# Patient Record
Sex: FEMALE | Race: WHITE | URBAN - METROPOLITAN AREA
[De-identification: names, ages, dates, MRNs, and addresses within clinical notes are randomized per-mention and may not be internally consistent; named-entity substitution may affect disease eponyms.]

---

## 2019-11-11 ENCOUNTER — COMPLETE SKIN EXAM (OUTPATIENT)
Dept: URBAN - METROPOLITAN AREA CLINIC 18 | Facility: CLINIC | Age: 39
Setting detail: DERMATOLOGY
End: 2019-11-11

## 2019-11-11 ENCOUNTER — RX ONLY (RX ONLY)
Age: 39
End: 2019-11-11

## 2019-11-11 DIAGNOSIS — D22.5 MELANOCYTIC NEVI OF TRUNK: ICD-10-CM

## 2019-11-11 DIAGNOSIS — L82.1 OTHER SEBORRHEIC KERATOSIS: ICD-10-CM

## 2019-11-11 DIAGNOSIS — L81.4 OTHER MELANIN HYPERPIGMENTATION: ICD-10-CM

## 2019-11-11 PROCEDURE — 99214 OFFICE O/P EST MOD 30 MIN: CPT

## 2019-11-11 RX ORDER — IVERMECTIN 10 MG/G
CREAM TOPICAL
Qty: 45 | Refills: 3
Start: 2019-11-11

## 2019-11-11 RX ORDER — CICLOPIROX OLAMINE 7.7 MG/G
CREAM TOPICAL
Qty: 90 | Refills: 3
Start: 2019-11-11

## 2019-11-11 RX ORDER — CLOTRIMAZOLE AND BETAMETHASONE DIPROPIONATE 10; .5 MG/G; MG/G
CREAM TOPICAL
Qty: 45 | Refills: 0
Start: 2019-11-11

## 2019-12-26 ENCOUNTER — RX ONLY (RX ONLY)
Age: 39
End: 2019-12-26

## 2019-12-26 RX ORDER — OXYMETAZOLINE HYDROCHLORIDE 1 G/100G
CREAM TOPICAL
Qty: 30 | Refills: 3
Start: 2019-12-26

## 2020-11-09 ENCOUNTER — RX ONLY (RX ONLY)
Age: 40
End: 2020-11-09

## 2020-11-09 ENCOUNTER — FOLLOW-UP (OUTPATIENT)
Dept: URBAN - METROPOLITAN AREA CLINIC 18 | Facility: CLINIC | Age: 40
Setting detail: DERMATOLOGY
End: 2020-11-09

## 2020-11-09 DIAGNOSIS — Z48.02 ENCOUNTER FOR REMOVAL OF SUTURES: ICD-10-CM

## 2020-11-09 PROBLEM — L82.1 OTHER SEBORRHEIC KERATOSIS: Status: RESOLVED | Noted: 2020-11-09

## 2020-11-09 PROBLEM — D18.01 HEMANGIOMA OF SKIN AND SUBCUTANEOUS TISSUE: Status: RESOLVED | Noted: 2020-11-09

## 2020-11-09 PROCEDURE — 99214 OFFICE O/P EST MOD 30 MIN: CPT

## 2020-11-09 RX ORDER — HYDROCORTISONE 25 MG/G
CREAM TOPICAL
Qty: 60 | Refills: 0
Start: 2020-11-09

## 2021-09-23 ENCOUNTER — COMPLETE SKIN EXAM (OUTPATIENT)
Dept: URBAN - METROPOLITAN AREA CLINIC 18 | Facility: CLINIC | Age: 41
Setting detail: DERMATOLOGY
End: 2021-09-23

## 2021-09-23 DIAGNOSIS — B07.9 VIRAL WART, UNSPECIFIED: ICD-10-CM

## 2021-09-23 PROBLEM — D18.01 HEMANGIOMA OF SKIN AND SUBCUTANEOUS TISSUE: Status: RESOLVED | Noted: 2021-09-23

## 2021-09-23 PROCEDURE — 99214 OFFICE O/P EST MOD 30 MIN: CPT

## 2022-01-27 ENCOUNTER — HOSPITAL ENCOUNTER (EMERGENCY)
Age: 42
Discharge: HOME | End: 2022-01-27
Payer: COMMERCIAL

## 2022-01-27 ENCOUNTER — APPOINTMENT (OUTPATIENT)
Dept: GENERAL RADIOLOGY | Age: 42
End: 2022-01-27
Payer: COMMERCIAL

## 2022-01-27 VITALS
HEIGHT: 66 IN | BODY MASS INDEX: 47.09 KG/M2 | SYSTOLIC BLOOD PRESSURE: 100 MMHG | HEART RATE: 92 BPM | DIASTOLIC BLOOD PRESSURE: 60 MMHG | WEIGHT: 293 LBS | OXYGEN SATURATION: 91 % | RESPIRATION RATE: 17 BRPM | TEMPERATURE: 98.7 F

## 2022-01-27 DIAGNOSIS — R19.7 NAUSEA VOMITING AND DIARRHEA: ICD-10-CM

## 2022-01-27 DIAGNOSIS — R07.9 CHEST PAIN, UNSPECIFIED TYPE: Primary | ICD-10-CM

## 2022-01-27 DIAGNOSIS — R11.2 NAUSEA VOMITING AND DIARRHEA: ICD-10-CM

## 2022-01-27 LAB
ALBUMIN SERPL-MCNC: 4.3 G/DL (ref 3.5–5.2)
ALP BLD-CCNC: 68 U/L (ref 35–104)
ALT SERPL-CCNC: 40 U/L (ref 5–33)
ANION GAP SERPL CALCULATED.3IONS-SCNC: 11 MMOL/L (ref 7–19)
AST SERPL-CCNC: 26 U/L (ref 5–32)
BASOPHILS ABSOLUTE: 0 K/UL (ref 0–0.2)
BASOPHILS RELATIVE PERCENT: 0.4 % (ref 0–1)
BILIRUB SERPL-MCNC: 0.3 MG/DL (ref 0.2–1.2)
BILIRUBIN URINE: NEGATIVE
BLOOD, URINE: NEGATIVE
BUN BLDV-MCNC: 11 MG/DL (ref 6–20)
CALCIUM SERPL-MCNC: 9.3 MG/DL (ref 8.6–10)
CHLORIDE BLD-SCNC: 102 MMOL/L (ref 98–111)
CLARITY: CLEAR
CO2: 26 MMOL/L (ref 22–29)
COLOR: YELLOW
CREAT SERPL-MCNC: 0.5 MG/DL (ref 0.5–0.9)
D DIMER: 0.37 UG/ML FEU (ref 0–0.48)
EOSINOPHILS ABSOLUTE: 0.2 K/UL (ref 0–0.6)
EOSINOPHILS RELATIVE PERCENT: 1.9 % (ref 0–5)
GFR AFRICAN AMERICAN: >59
GFR NON-AFRICAN AMERICAN: >60
GLUCOSE BLD-MCNC: 101 MG/DL (ref 74–109)
GLUCOSE URINE: NEGATIVE MG/DL
HCT VFR BLD CALC: 42.4 % (ref 37–47)
HEMOGLOBIN: 13.4 G/DL (ref 12–16)
IMMATURE GRANULOCYTES #: 0 K/UL
KETONES, URINE: NEGATIVE MG/DL
LEUKOCYTE ESTERASE, URINE: NEGATIVE
LYMPHOCYTES ABSOLUTE: 2.4 K/UL (ref 1.1–4.5)
LYMPHOCYTES RELATIVE PERCENT: 24.8 % (ref 20–40)
MCH RBC QN AUTO: 27.2 PG (ref 27–31)
MCHC RBC AUTO-ENTMCNC: 31.6 G/DL (ref 33–37)
MCV RBC AUTO: 86 FL (ref 81–99)
MONOCYTES ABSOLUTE: 0.7 K/UL (ref 0–0.9)
MONOCYTES RELATIVE PERCENT: 7.6 % (ref 0–10)
NEUTROPHILS ABSOLUTE: 6.2 K/UL (ref 1.5–7.5)
NEUTROPHILS RELATIVE PERCENT: 64.9 % (ref 50–65)
NITRITE, URINE: NEGATIVE
PDW BLD-RTO: 13.2 % (ref 11.5–14.5)
PH UA: 5.5 (ref 5–8)
PLATELET # BLD: 288 K/UL (ref 130–400)
PMV BLD AUTO: 9.1 FL (ref 9.4–12.3)
POTASSIUM REFLEX MAGNESIUM: 4 MMOL/L (ref 3.5–5)
PROTEIN UA: NEGATIVE MG/DL
RBC # BLD: 4.93 M/UL (ref 4.2–5.4)
SARS-COV-2, NAAT: NOT DETECTED
SODIUM BLD-SCNC: 139 MMOL/L (ref 136–145)
SPECIFIC GRAVITY UA: 1.02 (ref 1–1.03)
TOTAL PROTEIN: 7 G/DL (ref 6.6–8.7)
TROPONIN: <0.01 NG/ML (ref 0–0.03)
TROPONIN: <0.01 NG/ML (ref 0–0.03)
UROBILINOGEN, URINE: 0.2 E.U./DL
WBC # BLD: 9.5 K/UL (ref 4.8–10.8)

## 2022-01-27 PROCEDURE — 71045 X-RAY EXAM CHEST 1 VIEW: CPT

## 2022-01-27 PROCEDURE — 2580000003 HC RX 258: Performed by: NURSE PRACTITIONER

## 2022-01-27 PROCEDURE — 96374 THER/PROPH/DIAG INJ IV PUSH: CPT

## 2022-01-27 PROCEDURE — 96375 TX/PRO/DX INJ NEW DRUG ADDON: CPT

## 2022-01-27 PROCEDURE — 85379 FIBRIN DEGRADATION QUANT: CPT

## 2022-01-27 PROCEDURE — 36415 COLL VENOUS BLD VENIPUNCTURE: CPT

## 2022-01-27 PROCEDURE — 87635 SARS-COV-2 COVID-19 AMP PRB: CPT

## 2022-01-27 PROCEDURE — 80053 COMPREHEN METABOLIC PANEL: CPT

## 2022-01-27 PROCEDURE — 84484 ASSAY OF TROPONIN QUANT: CPT

## 2022-01-27 PROCEDURE — 85025 COMPLETE CBC W/AUTO DIFF WBC: CPT

## 2022-01-27 PROCEDURE — 99283 EMERGENCY DEPT VISIT LOW MDM: CPT

## 2022-01-27 PROCEDURE — 6360000002 HC RX W HCPCS: Performed by: NURSE PRACTITIONER

## 2022-01-27 PROCEDURE — C9113 INJ PANTOPRAZOLE SODIUM, VIA: HCPCS | Performed by: NURSE PRACTITIONER

## 2022-01-27 PROCEDURE — 93005 ELECTROCARDIOGRAM TRACING: CPT | Performed by: NURSE PRACTITIONER

## 2022-01-27 PROCEDURE — 81003 URINALYSIS AUTO W/O SCOPE: CPT

## 2022-01-27 RX ORDER — ONDANSETRON 4 MG/1
4 TABLET, ORALLY DISINTEGRATING ORAL EVERY 8 HOURS PRN
Qty: 10 TABLET | Refills: 0 | Status: SHIPPED | OUTPATIENT
Start: 2022-01-27

## 2022-01-27 RX ORDER — SODIUM CHLORIDE, SODIUM LACTATE, POTASSIUM CHLORIDE, AND CALCIUM CHLORIDE .6; .31; .03; .02 G/100ML; G/100ML; G/100ML; G/100ML
1000 INJECTION, SOLUTION INTRAVENOUS ONCE
Status: COMPLETED | OUTPATIENT
Start: 2022-01-27 | End: 2022-01-27

## 2022-01-27 RX ORDER — SODIUM CHLORIDE 9 MG/ML
10 INJECTION INTRAVENOUS DAILY
Status: DISCONTINUED | OUTPATIENT
Start: 2022-01-27 | End: 2022-01-27 | Stop reason: HOSPADM

## 2022-01-27 RX ORDER — KETOROLAC TROMETHAMINE 30 MG/ML
30 INJECTION, SOLUTION INTRAMUSCULAR; INTRAVENOUS ONCE
Status: COMPLETED | OUTPATIENT
Start: 2022-01-27 | End: 2022-01-27

## 2022-01-27 RX ORDER — ONDANSETRON 4 MG/1
4 TABLET, ORALLY DISINTEGRATING ORAL EVERY 8 HOURS PRN
Qty: 10 TABLET | Refills: 0 | Status: SHIPPED | OUTPATIENT
Start: 2022-01-27 | End: 2022-02-06

## 2022-01-27 RX ORDER — ONDANSETRON 2 MG/ML
4 INJECTION INTRAMUSCULAR; INTRAVENOUS ONCE
Status: COMPLETED | OUTPATIENT
Start: 2022-01-27 | End: 2022-01-27

## 2022-01-27 RX ORDER — PANTOPRAZOLE SODIUM 40 MG/10ML
40 INJECTION, POWDER, LYOPHILIZED, FOR SOLUTION INTRAVENOUS DAILY
Status: DISCONTINUED | OUTPATIENT
Start: 2022-01-27 | End: 2022-01-27 | Stop reason: HOSPADM

## 2022-01-27 RX ORDER — SERTRALINE HYDROCHLORIDE 100 MG/1
100 TABLET, FILM COATED ORAL DAILY
COMMUNITY

## 2022-01-27 RX ORDER — ORPHENADRINE CITRATE 30 MG/ML
60 INJECTION INTRAMUSCULAR; INTRAVENOUS ONCE
Status: COMPLETED | OUTPATIENT
Start: 2022-01-27 | End: 2022-01-27

## 2022-01-27 RX ADMIN — KETOROLAC TROMETHAMINE 30 MG: 30 INJECTION, SOLUTION INTRAMUSCULAR; INTRAVENOUS at 18:00

## 2022-01-27 RX ADMIN — PANTOPRAZOLE SODIUM 40 MG: 40 INJECTION, POWDER, FOR SOLUTION INTRAVENOUS at 13:54

## 2022-01-27 RX ADMIN — SODIUM CHLORIDE, POTASSIUM CHLORIDE, SODIUM LACTATE AND CALCIUM CHLORIDE 1000 ML: 600; 310; 30; 20 INJECTION, SOLUTION INTRAVENOUS at 13:55

## 2022-01-27 RX ADMIN — SODIUM CHLORIDE, PRESERVATIVE FREE 10 ML: 5 INJECTION INTRAVENOUS at 13:54

## 2022-01-27 RX ADMIN — ORPHENADRINE CITRATE 60 MG: 30 INJECTION INTRAMUSCULAR; INTRAVENOUS at 18:00

## 2022-01-27 RX ADMIN — ONDANSETRON 4 MG: 2 INJECTION INTRAMUSCULAR; INTRAVENOUS at 13:55

## 2022-01-27 ASSESSMENT — ENCOUNTER SYMPTOMS
VOMITING: 1
ABDOMINAL PAIN: 1
DIARRHEA: 1
NAUSEA: 1
COUGH: 1
SHORTNESS OF BREATH: 1

## 2022-01-27 ASSESSMENT — PAIN DESCRIPTION - LOCATION: LOCATION: CHEST

## 2022-01-27 ASSESSMENT — PAIN SCALES - GENERAL
PAINLEVEL_OUTOF10: 3
PAINLEVEL_OUTOF10: 5

## 2022-01-27 ASSESSMENT — HEART SCORE: ECG: 0

## 2022-01-27 ASSESSMENT — PAIN DESCRIPTION - DESCRIPTORS: DESCRIPTORS: BURNING

## 2022-01-27 NOTE — ED NOTES
Bed: 11  Expected date:   Expected time:   Means of arrival:   Comments:  Terminal      Ezekiel Banegas, LUC  01/27/22 [id number]

## 2022-01-28 LAB
EKG P AXIS: 62 DEGREES
EKG P-R INTERVAL: 140 MS
EKG Q-T INTERVAL: 368 MS
EKG QRS DURATION: 102 MS
EKG QTC CALCULATION (BAZETT): 422 MS
EKG T AXIS: 39 DEGREES

## 2022-01-28 PROCEDURE — 93010 ELECTROCARDIOGRAM REPORT: CPT | Performed by: INTERNAL MEDICINE

## 2022-02-10 LAB
EKG P AXIS: 63 DEGREES
EKG P-R INTERVAL: 132 MS
EKG Q-T INTERVAL: 358 MS
EKG QRS DURATION: 100 MS
EKG QTC CALCULATION (BAZETT): 421 MS
EKG T AXIS: 46 DEGREES

## 2022-11-04 ENCOUNTER — TELEPHONE (OUTPATIENT)
Dept: BARIATRICS/WEIGHT MGMT | Facility: CLINIC | Age: 42
End: 2022-11-04

## 2022-11-04 NOTE — TELEPHONE ENCOUNTER
LVM to remind them of their new patient appointment, to bring completed paperwork, sign up for [id number]. Please arrive 30 minutes early if these aren't completed. Please call back with any questions

## 2022-11-07 ENCOUNTER — OFFICE VISIT (OUTPATIENT)
Dept: BARIATRICS/WEIGHT MGMT | Facility: CLINIC | Age: 42
End: 2022-11-07

## 2022-11-07 VITALS
DIASTOLIC BLOOD PRESSURE: 81 MMHG | SYSTOLIC BLOOD PRESSURE: 126 MMHG | HEART RATE: 90 BPM | OXYGEN SATURATION: 96 % | HEIGHT: 66 IN | TEMPERATURE: 97.5 F | BODY MASS INDEX: 47.09 KG/M2 | WEIGHT: 293 LBS

## 2022-11-07 DIAGNOSIS — I15.9 SECONDARY HYPERTENSION: ICD-10-CM

## 2022-11-07 DIAGNOSIS — E66.01 CLASS 3 SEVERE OBESITY DUE TO EXCESS CALORIES WITH SERIOUS COMORBIDITY AND BODY MASS INDEX (BMI) OF 50.0 TO 59.9 IN ADULT: Primary | ICD-10-CM

## 2022-11-07 DIAGNOSIS — K21.9 GASTROESOPHAGEAL REFLUX DISEASE, UNSPECIFIED WHETHER ESOPHAGITIS PRESENT: ICD-10-CM

## 2022-11-07 DIAGNOSIS — G47.30 SLEEP APNEA, UNSPECIFIED TYPE: ICD-10-CM

## 2022-11-07 PROCEDURE — 99214 OFFICE O/P EST MOD 30 MIN: CPT | Performed by: NURSE PRACTITIONER

## 2022-11-07 RX ORDER — SERTRALINE HYDROCHLORIDE 100 MG/1
50 TABLET, FILM COATED ORAL DAILY
COMMUNITY
Start: 2022-08-12

## 2022-11-07 RX ORDER — SUMATRIPTAN 25 MG/1
25 TABLET, FILM COATED ORAL
COMMUNITY
End: 2023-01-21

## 2022-11-07 RX ORDER — CLOTRIMAZOLE AND BETAMETHASONE DIPROPIONATE 10; .64 MG/G; MG/G
CREAM TOPICAL SEE ADMIN INSTRUCTIONS
COMMUNITY
Start: 2022-10-03 | End: 2023-02-21

## 2022-11-07 RX ORDER — LOSARTAN POTASSIUM 100 MG/1
100 TABLET ORAL DAILY
COMMUNITY
Start: 2022-10-02 | End: 2023-03-02 | Stop reason: SDUPTHER

## 2022-11-07 RX ORDER — AMLODIPINE BESYLATE 10 MG/1
10 TABLET ORAL DAILY
COMMUNITY
Start: 2022-08-14 | End: 2023-01-21

## 2022-11-07 RX ORDER — METHYLPHENIDATE HYDROCHLORIDE 36 MG/1
36 TABLET ORAL DAILY
COMMUNITY
Start: 2022-11-03

## 2022-11-07 NOTE — PROGRESS NOTES
Patient Care Team:  Provider, No Known as PCP - General      Subjective     Patient is a 42 y.o. female presents with morbid obesity and her Body mass index is 57.4 kg/m².     She is here for discussion of medical ad surgical weight loss options.  She stated she has been with the disease of obesity for year(s).  She stated she suffers from JOSE, GERD, hypertension and morbid obesity due to her weight gain.  She stated that weight loss helps alleviate these symptoms.   She stated that she has tried other diet regimens such as weight watchers to help with weight loss.  She stated that she has attempted these conservative methods for weight loss without maintaining long term success.  Today she would like to discuss medical and surgical weight loss options such as the Laparoscopic Sleeve Gastrectomy or the Laparoscopic R - Y Gastric Bypass.     She admits to trying multiple diets and struggling with sustaining weight loss.  She states that she is interested in bariatric surgery.    Review of Systems   Constitutional: Positive for fatigue.   Respiratory: Positive for shortness of breath.    Cardiovascular: Negative.    Gastrointestinal: Negative.    Endocrine: Negative.    Musculoskeletal: Positive for arthralgias, back pain and joint swelling.   Psychiatric/Behavioral: Positive for depressed mood. The patient is nervous/anxious.         History  Past Medical History:   Diagnosis Date   • GERD (gastroesophageal reflux disease)    • Hernia    • Hypertension    • Sleep apnea       Past Surgical History:   Procedure Laterality Date   • CHOLECYSTECTOMY  2004   • HYSTERECTOMY     • TONSILLECTOMY        Social History     Socioeconomic History   • Marital status:    Tobacco Use   • Smoking status: Never   • Smokeless tobacco: Never   Vaping Use   • Vaping Use: Never used   Substance and Sexual Activity   • Alcohol use: Yes   • Drug use: Never      History reviewed. No pertinent family history.   Allergies   Allergen  Reactions   • Promethazine Itching          Current Outpatient Medications:   •  amLODIPine (NORVASC) 10 MG tablet, Take 1 tablet by mouth Daily., Disp: , Rfl:   •  clotrimazole-betamethasone (LOTRISONE) 1-0.05 % cream, Apply  topically to the appropriate area as directed See Admin Instructions. Apply topically to the affected area twice daily, Disp: , Rfl:   •  cyclobenzaprine (FLEXERIL) 10 MG tablet, Take 1 tablet by mouth 3 (Three) Times a Day As Needed for Muscle Spasms., Disp: 30 tablet, Rfl: 0  •  losartan (COZAAR) 100 MG tablet, Take 1 tablet by mouth Daily., Disp: , Rfl:   •  methylphenidate 36 MG CR tablet, Take 1 tablet by mouth Daily, Disp: , Rfl:   •  ondansetron ODT (ZOFRAN-ODT) 4 MG disintegrating tablet, Take 1 tablet by mouth Every 8 (Eight) Hours As Needed., Disp: , Rfl:   •  sertraline (ZOLOFT) 50 MG tablet, Take 1 tablet by mouth Daily., Disp: , Rfl:   •  SUMAtriptan (IMITREX) 25 MG tablet, Take 1 tablet by mouth Every 2 (Two) Hours As Needed for Migraine. Take one tablet at onset of headache. May repeat dose one time in 2 hours if headache not relieved., Disp: , Rfl:   •  vitamin D (ERGOCALCIFEROL) 1.25 MG (68868 UT) capsule capsule, Take 1 capsule by mouth 1 (One) Time Per Week., Disp: , Rfl:     Objective     Vital Signs  Temp:  [97.5 °F (36.4 °C)] 97.5 °F (36.4 °C)  Heart Rate:  [90] 90  BP: (126)/(81) 126/81  Body mass index is 57.4 kg/m².      11/07/22  1418   Weight: (!) 161 kg (355 lb 9.6 oz)       Physical Exam  Vitals reviewed.   Constitutional:       Appearance: She is obese.   Cardiovascular:      Rate and Rhythm: Normal rate and regular rhythm.   Pulmonary:      Effort: Pulmonary effort is normal.   Abdominal:      General: Bowel sounds are normal.      Palpations: Abdomen is soft.   Musculoskeletal:         General: Normal range of motion.   Skin:     General: Skin is warm and dry.   Neurological:      Mental Status: She is alert and oriented to person, place, and time.    Psychiatric:         Mood and Affect: Mood normal.         Behavior: Behavior normal.            Results Review:   I reviewed the patient's new clinical results.      Class 3 Severe Obesity (BMI >=40). Obesity-related health conditions include the following: obstructive sleep apnea, hypertension and dyslipidemias. Obesity is worsening. BMI is is above average; BMI management plan is completed. We discussed portion control and increasing exercise.      Assessment & Plan   Diagnoses and all orders for this visit:    1. Class 3 severe obesity due to excess calories with serious comorbidity and body mass index (BMI) of 50.0 to 59.9 in adult (HCC) (Primary)  Assessment & Plan:  Patient's (Body mass index is 57.4 kg/m².) indicates that they are morbidly obese (BMI > 40 or > 35 with obesity - related health condition) with health conditions that include obstructive sleep apnea and hypertension . Weight is improving with treatment. BMI is is above average; BMI management plan is completed. We discussed portion control and increasing exercise.       2. Sleep apnea, unspecified type  Comments:  Patient states she is unable to tolerate her CPAP and does not wear it.     3. Gastroesophageal reflux disease, unspecified whether esophagitis present  Comments:  Nutritonal counseling provided. EGD will be ordered at later date     4. Secondary hypertension  Comments:  Continue current regimen.  Nutritional counseling provided.      She has been provided a structured dietary regimen based off of her behavior.  I discussed with the patient the etiology of the disease of obesity and the potential comorbid conditions associated with this disease.  She was instructed to follow the dietary regimen and follow-up with our program in 1 month's time with any additional questions as they may arise during this time.  We emphasized on focusing on proteins and meals high in fiber as well as adequate hydration that exceed 64 ounces of water  daily.    I explained that I anticipate the patient to lose weight prior to her next monthly visit.  I encouraged patient to have a reward day once a month.    1. Patient is a 42 y.o. female who has morbid obesity with Body mass index is 57.4 kg/m². and desires surgical weight loss. Patient has been advised that this surgery is considered to be elective major surgery that is typically done laparoscopically. Patient is a potentially good surgical candidate. Patient will need to meet with the Bariatric Surgeon to further discuss surgical options. Patient has been advised that they will need to have a work-up prior to surgery. This work-up will include but is not limited to an EKG, an EGD to assess for H. Pylori and Valentin's esophagus, and a psychological evaluation, with additional testing as necessary. Pre-op testing will be ordered at next visit. Today the patient  received the 4 meals/day diet prescription, which was explained to patient.  Patient will see the dietitian today to further discuss goals for diet, exercise, and lifestyle. Patient has received intensive behavioral therapy for obesity today. I explained the pathophysiology of the disease and its storage component. We also discussed Dr. Michele' pearls of the program. Nutrition counseling ordered today.     2. Current comorbid condition of  GERD and sleep apnea associated with her morbid obesity is reported to be stable on her current treatment regimen and medications. We anticipate the comorbid condition to improve as we address her morbid obesity.          Pre-op testing:     Seminar - Completed  EGD - Needed, but not yet ordered  H. Pylori - Will be done with EGD   H. Pylori Stool -  N/A    Psychological Evaluation - Needed, but not yet ordered    EKG - Needed, but not yet ordered    Cardiology - If EKG abnormal, cardiology will be ordered     TSH - Needed, but not yet ordered  Nicotine - N/A    Pulmonary Clearance - N/A   Drug Tests - N/A    Dietitian  - Completed   I discussed the patient's findings and my recommendations with patient.     I have also recommended that she obtain a cardiac risk assessment and psychiatric evaluation prior to surgery consideration.  Patient admits to having a hiatal hernia.     Manda Benton, APRN     11/07/22  15:25 CST

## 2022-11-07 NOTE — ASSESSMENT & PLAN NOTE
Patient's (Body mass index is 57.4 kg/m².) indicates that they are morbidly obese (BMI > 40 or > 35 with obesity - related health condition) with health conditions that include obstructive sleep apnea and hypertension . Weight is improving with treatment. BMI is is above average; BMI management plan is completed. We discussed portion control and increasing exercise.

## 2022-11-07 NOTE — PROGRESS NOTES
"Metabolic and Bariatric Surgery Adult Nutrition Assessment    Patient Name: Allison Mckeon   YOB: 1980   MRN: [id number]     Assessment Date:  11/07/2022     Reason for Visit: Initial Nutrition Assessment     Treatment Pathway: Preoperative Bariatric Surgery, Visit 1    Assessment    Anthropometrics   Wt Readings from Last 1 Encounters:   11/07/22 (!) 161 kg (355 lb 9.6 oz)     Ht Readings from Last 1 Encounters:   11/07/22 167.6 cm (66\")     BMI Readings from Last 1 Encounters:   11/07/22 57.40 kg/m²        Initial Weight/Date: 355.6 lbs (Nov 2022)  Weight Changes since last visit: n/a  Net Weight Change: n/a    Past Medical History:   Diagnosis Date   • GERD (gastroesophageal reflux disease)    • Hernia    • Hypertension    • Sleep apnea       Past Surgical History:   Procedure Laterality Date   • CHOLECYSTECTOMY  2004   • HYSTERECTOMY     • TONSILLECTOMY        Current Outpatient Medications   Medication Sig Dispense Refill   • amLODIPine (NORVASC) 10 MG tablet Take 1 tablet by mouth Daily.     • clotrimazole-betamethasone (LOTRISONE) 1-0.05 % cream Apply  topically to the appropriate area as directed See Admin Instructions. Apply topically to the affected area twice daily     • cyclobenzaprine (FLEXERIL) 10 MG tablet Take 1 tablet by mouth 3 (Three) Times a Day As Needed for Muscle Spasms. 30 tablet 0   • losartan (COZAAR) 100 MG tablet Take 1 tablet by mouth Daily.     • methylphenidate 36 MG CR tablet Take 1 tablet by mouth Daily     • ondansetron ODT (ZOFRAN-ODT) 4 MG disintegrating tablet Take 1 tablet by mouth Every 8 (Eight) Hours As Needed.     • sertraline (ZOLOFT) 50 MG tablet Take 1 tablet by mouth Daily.     • SUMAtriptan (IMITREX) 25 MG tablet Take 1 tablet by mouth Every 2 (Two) Hours As Needed for Migraine. Take one tablet at onset of headache. May repeat dose one time in 2 hours if headache not relieved.     • vitamin D (ERGOCALCIFEROL) 1.25 MG (74940 UT) capsule capsule Take 1 " "capsule by mouth 1 (One) Time Per Week.       No current facility-administered medications for this visit.      Allergies   Allergen Reactions   • Promethazine Itching          Motivation for weight loss includes: Wants to feel better and ability to navigate life better. Wants to be able to play on playground with daughter and granddaughters.     Pertinent Social/Behavior/Environmental History: works full time as a  remote for [employer]  Does work from home but also traveling quite a bit (2-3x/month). Functions best with routine and structure. Has 4 children; 2 youngest (9 & 10) at home. Oldest two children are out of the house, also keeps granddaughter at least weekly. Admits to having ADD Hyper focus & will get very focused on work and forget to drink water.     Nutrition Recall  Eating 3 meals daily. Evenings typically default to \"easy/convinet meal\"  Food recall reviewed.   Snacking - not typically intentionally, does tend to graze sometimes on leftovers from children/grandchild plates  Drinking sugary/carbonated beverages- enjoys carbonation, carbonated water/regular sodas- x2/d. Cup of coffee ~4d/wk with creamer.   Fluid Intake- lemon ice water, sets alarms to get up and get water.   Alcohol- social drinker, couple drinks at dinner ~8x/yr    Exercise: minimal  Recommended increasing physical activity, beyond normal daily habits, gradually working to reach ~30 minutes daily.     Nutrition Intervention  Nutrition education and nutrition coaching for behavior change provided.  Strategies used included Comprehensive education, Motivational Interviewing , Problem Solving, Skill Development for meal planning and Provided sample menus  Review of medical weight loss prescription 4 meal/day plan and reviewed nutritional needs for Preoperative Bariatric Surgery, Visit 1.  Self-monitoring strategies such as keeping a food journal (on paper or electronically) and calculating fluid/protein intake were " discussed.    Recommended Diet Changes  Eat 4 meals per day with protein and vegetables at each meal, no carbs after meal 2., Protein goal: 65gms., Eat vegetables first at each meal., Discussed protein guidelines for shakes and bars., Reduce snacking -use foods from free foods list only., Reduce fat, sugar, and/or salt in food choices., Choose more nutrient dense foods., Choose foods with increased fiber., Monitor portion sizes using a food scale and/or measuring cup., Eliminate soda and sugar-sweetened beverages and Increase fluid intake to 64 ounces per day      Goals  1. Eliminating carbonated drinks.  2. Look at meal prior to each meal.   3. Record food intake.    Monitoring/Evaluation Plan  Anticipate follow up per program protocol. Continue collaboration of care with physician and treatment team.     Electronically signed by  Yasmeen Veloz RDN, LD  11/07/2022 15:21 CST.

## 2022-11-10 ENCOUNTER — PATIENT ROUNDING (BHMG ONLY) (OUTPATIENT)
Dept: BARIATRICS/WEIGHT MGMT | Facility: CLINIC | Age: 42
End: 2022-11-10

## 2022-11-10 NOTE — PROGRESS NOTES
November 10, 2022    Hello, may I speak with Allison Mckeon?    My name is Jennifer    I am  with [facility]  [address], [address]  [place] [address] [phone].    Before we get started may I verify your date of birth? 1980    I am calling to officially welcome you to our practice and ask about your recent visit. Is this a good time to talk? LVM    Tell me about your visit with us. What things went well?         We're always looking for ways to make our patients' experiences even better. Do you have recommendations on ways we may improve?      Overall were you satisfied with your first visit to our practice?        I appreciate you taking the time to speak with me today. Is there anything else I can do for you?       Thank you, and have a great day.

## 2022-12-12 ENCOUNTER — OFFICE VISIT (OUTPATIENT)
Dept: BARIATRICS/WEIGHT MGMT | Facility: CLINIC | Age: 42
End: 2022-12-12

## 2022-12-12 VITALS
DIASTOLIC BLOOD PRESSURE: 85 MMHG | HEART RATE: 93 BPM | SYSTOLIC BLOOD PRESSURE: 137 MMHG | WEIGHT: 293 LBS | HEIGHT: 66 IN | BODY MASS INDEX: 47.09 KG/M2 | OXYGEN SATURATION: 97 % | TEMPERATURE: 98 F

## 2022-12-12 DIAGNOSIS — K21.9 GASTROESOPHAGEAL REFLUX DISEASE, UNSPECIFIED WHETHER ESOPHAGITIS PRESENT: ICD-10-CM

## 2022-12-12 DIAGNOSIS — E66.01 CLASS 3 SEVERE OBESITY DUE TO EXCESS CALORIES WITH SERIOUS COMORBIDITY AND BODY MASS INDEX (BMI) OF 50.0 TO 59.9 IN ADULT: Primary | ICD-10-CM

## 2022-12-12 DIAGNOSIS — I15.9 SECONDARY HYPERTENSION: ICD-10-CM

## 2022-12-12 DIAGNOSIS — G47.30 SLEEP APNEA, UNSPECIFIED TYPE: ICD-10-CM

## 2022-12-12 PROCEDURE — 99214 OFFICE O/P EST MOD 30 MIN: CPT | Performed by: NURSE PRACTITIONER

## 2022-12-12 NOTE — ASSESSMENT & PLAN NOTE
Patient's (Body mass index is 55.3 kg/m².) indicates that they are morbidly obese (BMI > 40 or > 35 with obesity - related health condition) with health conditions that include obstructive sleep apnea, hypertension and GERD . Weight is worsening. BMI is is above average; BMI management plan is completed. We discussed portion control and increasing exercise.

## 2022-12-12 NOTE — PROGRESS NOTES
"Metabolic and Bariatric Surgery Adult Nutrition Assessment    Patient Name: Allison Mckeon   YOB: 1980   MRN: [id number]     Assessment Date:  12/12/2022     Reason for Visit: Follow-up Nutrition Assessment     Treatment Pathway: Preoperative Bariatric Surgery, Visit 2    Assessment    Anthropometrics   Wt Readings from Last 1 Encounters:   12/12/22 (!) 155 kg (342 lb 9.6 oz)     Ht Readings from Last 1 Encounters:   12/12/22 167.6 cm (66\")     BMI Readings from Last 1 Encounters:   12/12/22 55.30 kg/m²        Initial Weight/Date: 355.6 lbs (Nov 2022)  Weight Changes since last visit: -13 lbs  Net Weight Change: -13 lbs    Past Medical History:   Diagnosis Date   • GERD (gastroesophageal reflux disease)    • Hernia    • Hypertension    • Sleep apnea       Past Surgical History:   Procedure Laterality Date   • CHOLECYSTECTOMY  2004   • HYSTERECTOMY     • TONSILLECTOMY        Current Outpatient Medications   Medication Sig Dispense Refill   • amLODIPine (NORVASC) 10 MG tablet Take 1 tablet by mouth Daily.     • clotrimazole-betamethasone (LOTRISONE) 1-0.05 % cream Apply  topically to the appropriate area as directed See Admin Instructions. Apply topically to the affected area twice daily     • cyclobenzaprine (FLEXERIL) 10 MG tablet Take 1 tablet by mouth 3 (Three) Times a Day As Needed for Muscle Spasms. 30 tablet 0   • losartan (COZAAR) 100 MG tablet Take 1 tablet by mouth Daily.     • methylphenidate 36 MG CR tablet Take 1 tablet by mouth Daily     • ondansetron ODT (ZOFRAN-ODT) 4 MG disintegrating tablet Take 1 tablet by mouth Every 8 (Eight) Hours As Needed.     • sertraline (ZOLOFT) 50 MG tablet Take 1 tablet by mouth Daily.     • SUMAtriptan (IMITREX) 25 MG tablet Take 1 tablet by mouth Every 2 (Two) Hours As Needed for Migraine. Take one tablet at onset of headache. May repeat dose one time in 2 hours if headache not relieved.     • vitamin D (ERGOCALCIFEROL) 1.25 MG (93238 UT) capsule capsule " Take 1 capsule by mouth 1 (One) Time Per Week.       No current facility-administered medications for this visit.      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     Nutrition Recall  Eating 3meals daily   (M1) hotel breakfast; using pre-bagged carrots/celery   (M2) chicken stir lozano with side salad. When at conferences it is harder; is choosing options based on meal plan.   (M3) dinners with co workers & ; order chicken/steak without butter with salad and vegetables etc. OR family burrito bowl  (M4) splitting dinner sometimes to eat leftovers back at home  Snacking - occasionally. Popcorn, banana,   Monitoring portions- at home; not when out to eat with coworkers on traveling trips. Does have collapsible measuring cups  Calculating Protein- [employer] jenise, logging water,jenise calculates protein.   Drinking sugary/carbonated beverages- only 1 soda this month.  Fluid Intake- 64+ oz daily      Success this Month: logging, consistently, identifying restaurants options.   Barriers: traveling for work, the amount of vegetables is very overwhelming and getting tired of vegetables.      Nutrition Intervention  Nutrition education and nutrition coaching for behavior change provided.  Strategies used included Motivational Interviewing , Problem Solving, Skill Development for meal planning, increasing veg intake andre at meal 1 while traveling and Ongoing reinforcement  Review of medical weight loss prescription 4 meal/day plan and reviewed nutritional needs for Preoperative Bariatric Surgery, Visit 2.  Self-monitoring strategies such as keeping a food journal (on paper or electronically) and calculating fluid/protein intake were discussed.    Recommended Diet Changes  Eat 4 meals per day with protein and vegetables at each meal, no carbs after meal 2., Protein goal: 65 gms., Eat vegetables  first at each meal., Discussed protein guidelines for shakes and bars., Reduce snacking -use foods from free foods list only., Reduce fat, sugar, and/or salt in food choices., Choose more nutrient dense foods., Choose foods with increased fiber., Monitor portion sizes using a food scale and/or measuring cup., Eliminate soda and sugar-sweetened beverages and Increase fluid intake to 64 ounces per day      Goals  1. Consistently get vegetables with meal 1.   2. Continue to log meals in jenise.  3. Utilize free foods as snacks.      Monitoring/Evaluation Plan  Anticipate follow up per program protocol. Continue collaboration of care with physician and treatment team.     Electronically signed by  Yasmeen Veloz RDN, LD  12/12/2022 10:27 CST.

## 2022-12-12 NOTE — PROGRESS NOTES
"Patient Care Team:  Provider, No Known as PCP - General    Reason for Visit:  Surgical Weight loss    Subjective   Allison Mckeon is a 42 y.o. female.     Allison is here for follow-up and continued medical management of her morbid obesity.  She is currently on a prescription diet.  Allisno previously was to apply dietary changes such as following the meal plan as directed.  She admits to eating 3-4 meals per day.  As a result she lost weight since her last visit.  She states she logged most all meals and is getting adequate water intake in. She states eating the amount of vegetables have been a struggle. She also admits struggle with eating the meal prescription while traveling.     Review Of Systems:  Review of Systems   Constitutional: Positive for fatigue.   Respiratory: Negative.    Cardiovascular: Negative.    Gastrointestinal: Negative.    Endocrine: Negative.    Musculoskeletal: Negative.    Psychiatric/Behavioral: Positive for sleep disturbance.         The following portions of the patient's history were reviewed and updated as appropriate: allergies, current medications, past family history, past medical history, past social history, past surgical history, and problem list.    Objective   /85 (BP Location: Right arm, Patient Position: Sitting, Cuff Size: Adult)   Pulse 93   Temp 98 °F (36.7 °C)   Ht 167.6 cm (66\")   Wt (!) 155 kg (342 lb 9.6 oz)   SpO2 97%   BMI 55.30 kg/m²       12/12/22  1001   Weight: (!) 155 kg (342 lb 9.6 oz)       Physical Exam  Vitals reviewed.   Constitutional:       Appearance: She is obese.   Eyes:      Pupils: Pupils are equal, round, and reactive to light.   Cardiovascular:      Rate and Rhythm: Normal rate and regular rhythm.   Pulmonary:      Effort: Pulmonary effort is normal.   Abdominal:      General: Bowel sounds are normal.      Palpations: Abdomen is soft.   Musculoskeletal:         General: Normal range of motion.   Skin:     General: Skin is warm and dry. "   Neurological:      Mental Status: She is alert and oriented to person, place, and time.   Psychiatric:         Mood and Affect: Mood normal.         Behavior: Behavior normal.         Class 3 Severe Obesity (BMI >=40). Obesity-related health conditions include the following: obstructive sleep apnea and hypertension. Obesity is improving with treatment. BMI is is above average; BMI management plan is completed. We discussed portion control and increasing exercise.     Assessment & Plan   Diagnoses and all orders for this visit:    1. Class 3 severe obesity due to excess calories with serious comorbidity and body mass index (BMI) of 50.0 to 59.9 in adult (HCC) (Primary)  Comments:  Dietitian consultation obtained.  Assessment & Plan:  Patient's (Body mass index is 55.3 kg/m².) indicates that they are morbidly obese (BMI > 40 or > 35 with obesity - related health condition) with health conditions that include obstructive sleep apnea, hypertension and GERD . Weight is worsening. BMI is is above average; BMI management plan is completed. We discussed portion control and increasing exercise.     Orders:  -     ECG 12 Lead; Future    2. Secondary hypertension  Comments:  Nutritional counseling provided.  Continue to monitor blood pressure levels.  EKG ordered.  Orders:  -     ECG 12 Lead; Future    3. Gastroesophageal reflux disease, unspecified whether esophagitis present  Comments:  Continue current regimen.  EGD will be discussed at next appointment.    4. Sleep apnea, unspecified type  Comments:  Does not tolerate the CPAP        Allison Mckeon was seen today for follow-up, obesity, nutrition counseling and weight loss.  She has lost weight since her last visit.  Today we discussed healthy changes in lifestyle, diet, and exercise. Dietician consultation obtained.  Allison Mckeon had received handouts to her explaining the recommendation on portion sizes/appetite control/reading nutrition labels.   Intensive behavioral  therapy for obesity was done today as well.     Goals for this month are:   1. Patient has appt with Dr Morris's office and state she will have evaluation done with them   2. EKG ordered   3. Has JOSE- no sleep study was ordered today.    Follow up in one month for a weight recheck.

## 2023-01-09 ENCOUNTER — OFFICE VISIT (OUTPATIENT)
Dept: BARIATRICS/WEIGHT MGMT | Facility: CLINIC | Age: 43
End: 2023-01-09
Payer: COMMERCIAL

## 2023-01-09 VITALS
HEART RATE: 90 BPM | SYSTOLIC BLOOD PRESSURE: 130 MMHG | DIASTOLIC BLOOD PRESSURE: 77 MMHG | HEIGHT: 66 IN | TEMPERATURE: 98 F | WEIGHT: 293 LBS | OXYGEN SATURATION: 95 % | BODY MASS INDEX: 47.09 KG/M2

## 2023-01-09 DIAGNOSIS — I15.9 SECONDARY HYPERTENSION: ICD-10-CM

## 2023-01-09 DIAGNOSIS — K21.9 GASTROESOPHAGEAL REFLUX DISEASE, UNSPECIFIED WHETHER ESOPHAGITIS PRESENT: ICD-10-CM

## 2023-01-09 DIAGNOSIS — E66.01 CLASS 3 SEVERE OBESITY DUE TO EXCESS CALORIES WITH SERIOUS COMORBIDITY AND BODY MASS INDEX (BMI) OF 50.0 TO 59.9 IN ADULT: Primary | ICD-10-CM

## 2023-01-09 DIAGNOSIS — G47.30 SLEEP APNEA, UNSPECIFIED TYPE: ICD-10-CM

## 2023-01-09 PROCEDURE — 99214 OFFICE O/P EST MOD 30 MIN: CPT | Performed by: SURGERY

## 2023-01-09 NOTE — PROGRESS NOTES
Patient Care Team:  Provider, No Known as PCP - General    Reason for Visit:  Surgical Weight loss      Subjective     Allison Mckeon is a pleasant 42 y.o. female and presents with morbid obesity with her Body mass index is 54.68 kg/m².    She is here for discussion of the upper endoscopic procedure.  She stated she has been with the disease of obesity for year(s).  She stated she suffers from sleep apnea, GERD, hypertension and morbid obesity due to her weight gain.  She stated that weight loss helps alleviate these symptoms.   She stated that she has tried multiple diet regimens to help with weight loss.  She stated that she has attempted these conservative methods for weight loss without maintaining long term success.  Today sheand myself will discuss surgical weight loss options such as the Laparoscopic Sleeve Gastrectomy or the Laparoscopic R - Y Gastric Bypass.        Review of Systems  General ROS: positive for  - fatigue  Respiratory ROS: no cough, shortness of breath, or wheezing  Cardiovascular ROS: no chest pain or dyspnea on exertion  Gastrointestinal ROS: no abdominal pain, change in bowel habits, or black or bloody stools    History  Past Medical History:   Diagnosis Date   • GERD (gastroesophageal reflux disease)    • Hernia    • Hypertension    • Sleep apnea      Past Surgical History:   Procedure Laterality Date   • CHOLECYSTECTOMY  2004   • HYSTERECTOMY     • TONSILLECTOMY       History reviewed. No pertinent family history.  Social History     Tobacco Use   • Smoking status: Never   • Smokeless tobacco: Never   Vaping Use   • Vaping Use: Never used   Substance Use Topics   • Alcohol use: Yes   • Drug use: Never     E-cigarette/Vaping   • E-cigarette/Vaping Use Never User      E-cigarette/Vaping Substances     (Not in a hospital admission)    Allergies:  Avocado and Promethazine      Current Outpatient Medications:   •  clotrimazole-betamethasone (LOTRISONE) 1-0.05 % cream, Apply  topically to the  appropriate area as directed See Admin Instructions. Apply topically to the affected area twice daily, Disp: , Rfl:   •  cyclobenzaprine (FLEXERIL) 10 MG tablet, Take 1 tablet by mouth 3 (Three) Times a Day As Needed for Muscle Spasms., Disp: 30 tablet, Rfl: 0  •  losartan (COZAAR) 100 MG tablet, Take 1 tablet by mouth Daily., Disp: , Rfl:   •  methylphenidate 36 MG CR tablet, Take 1 tablet by mouth Daily, Disp: , Rfl:   •  ondansetron ODT (ZOFRAN-ODT) 4 MG disintegrating tablet, Take 1 tablet by mouth Every 8 (Eight) Hours As Needed., Disp: , Rfl:   •  sertraline (ZOLOFT) 50 MG tablet, Take 1 tablet by mouth Daily., Disp: , Rfl:   •  SUMAtriptan (IMITREX) 25 MG tablet, Take 1 tablet by mouth Every 2 (Two) Hours As Needed for Migraine. Take one tablet at onset of headache. May repeat dose one time in 2 hours if headache not relieved., Disp: , Rfl:   •  vitamin D (ERGOCALCIFEROL) 1.25 MG (20772 UT) capsule capsule, Take 1 capsule by mouth 1 (One) Time Per Week., Disp: , Rfl:   •  amLODIPine (NORVASC) 10 MG tablet, Take 1 tablet by mouth Daily., Disp: , Rfl:     Objective     Vital Signs  Temp:  [98 °F (36.7 °C)] 98 °F (36.7 °C)  Heart Rate:  [90] 90  BP: (130)/(77) 130/77  Body mass index is 54.68 kg/m².      01/09/23  1114   Weight: (!) 154 kg (338 lb 12.8 oz)       Physical Exam:     HEENT: extra ocular movement intact and sclera clear, anicteric  Respiratory: appears well, vitals normal, no respiratory distress, acyanotic, normal RR, chest clear, no wheezing, crepitations, rhonchi, normal symmetric air entry  Cardiovascular: Regular rate and rhythm, S1, S2 normal, no murmur, click, rub or gallop  GI: Soft, non-tender, normal bowel sounds; no bruits, organomegaly or masses.  Abnormal shape: obese  Musculoskeletal: inspection - no abnormality  Neurologic: alert, oriented, normal speech, no focal findings or movement disorder noted       Results Review:   I reviewed the patient's new clinical  results.        Assessment & Plan   Encounter Diagnoses   Name Primary?   • Class 3 severe obesity due to excess calories with serious comorbidity and body mass index (BMI) of 50.0 to 59.9 in adult (HCC) Yes   • Secondary hypertension    • Gastroesophageal reflux disease, unspecified whether esophagitis present    • Sleep apnea, unspecified type            1.  I believe this patient will be a good candidate for weight loss surgery.  I have discussed the Kay - Y Gastric Bypass, laparoscopic sleeve gastrectomy and the Laparoscopic Gastric Band procedures.  We discussed the benefits of the surgeries including the benefit of weight loss and the possible reversal of co-morbid conditions associated with morbid obesity. I explained to the patient that prior to making a definitive decision on the type of surgery she will require an esophagogastroduodenoscopy with biopsies to assess for any contraindications for surgical weight loss.  I explained the alternatives  include not doing anything, or pursuing an UGI series which only offers a diagnosis with potential less accuracy compared to EGD, the benefits of the EGD such as identifying the pathology and anatomy of the upper GI system, and the risks and complications of the endoscopy were discussed in detail as well. I discussed the risk of perforation (one out of [id number], riskier with dilation), bleeding (one out of 500), and the rare risks of infection, adverse reaction to anesthesia, respiratory failure, cardiac failure including MI and adverse reaction to medications such as allergic reactions. We discussed consequences that could occur if a risk were to develop such as the need for hospitalization, blood transfusion, surgical intervention, medications, pain, disability and death. The patient verbalizes understanding and agrees to proceed. such as bleeding, perforation, swallowing difficulties and gas bloat can occur after this procedure.    Upon completion of our  discussion and addressing and answering her questions to her satisfation, informed consent was obtained.  She will be scheduled accordingly for the esophagogastroduodenoscopy procedure.    I discussed the patients findings and my recommendations with patient.     Dr. Binh Michele MD [facility]    01/09/23  12:07 CST  Patient Care Team:  Provider, No Known as PCP - General

## 2023-01-21 ENCOUNTER — HOSPITAL ENCOUNTER (OUTPATIENT)
Dept: GENERAL RADIOLOGY | Facility: HOSPITAL | Age: 43
Discharge: HOME | End: 2023-01-21
Payer: COMMERCIAL

## 2023-01-21 PROCEDURE — 71101 X-RAY EXAM UNILAT RIBS/CHEST: CPT

## 2023-02-01 ENCOUNTER — TELEPHONE (OUTPATIENT)
Dept: BARIATRICS/WEIGHT MGMT | Facility: CLINIC | Age: 43
End: 2023-02-01
Payer: COMMERCIAL

## 2023-02-01 NOTE — TELEPHONE ENCOUNTER
BA-EGD       Patient was called and reminded of their procedure with Dr Michele on 02/03/2023        Instructions:     1) Eat normal the day before your procedure until 8 p.m. in the evening.    2) Beginning at 8pm clear liquids only-no Red or Pink in Color   3) Nothing to eat or drink after midnight.  4) Bring a list of medications.   5) Advised that they must bring a  as that IV sedation is being used.           The patient voiced an understanding and was agreeable.

## 2023-02-03 ENCOUNTER — ANESTHESIA EVENT (OUTPATIENT)
Dept: GASTROENTEROLOGY | Facility: HOSPITAL | Age: 43
End: 2023-02-03
Payer: COMMERCIAL

## 2023-02-03 ENCOUNTER — HOSPITAL ENCOUNTER (OUTPATIENT)
Facility: HOSPITAL | Age: 43
Setting detail: HOSPITAL OUTPATIENT SURGERY
Discharge: HOME | End: 2023-02-03
Attending: SURGERY | Admitting: SURGERY
Payer: COMMERCIAL

## 2023-02-03 ENCOUNTER — ANESTHESIA (OUTPATIENT)
Dept: GASTROENTEROLOGY | Facility: HOSPITAL | Age: 43
End: 2023-02-03
Payer: COMMERCIAL

## 2023-02-03 VITALS
BODY MASS INDEX: 47.09 KG/M2 | SYSTOLIC BLOOD PRESSURE: 109 MMHG | HEIGHT: 66 IN | RESPIRATION RATE: 16 BRPM | TEMPERATURE: 97.3 F | HEART RATE: 88 BPM | OXYGEN SATURATION: 94 % | WEIGHT: 293 LBS | DIASTOLIC BLOOD PRESSURE: 62 MMHG

## 2023-02-03 DIAGNOSIS — I15.9 SECONDARY HYPERTENSION: ICD-10-CM

## 2023-02-03 DIAGNOSIS — E66.01 CLASS 3 SEVERE OBESITY DUE TO EXCESS CALORIES WITH SERIOUS COMORBIDITY AND BODY MASS INDEX (BMI) OF 50.0 TO 59.9 IN ADULT: ICD-10-CM

## 2023-02-03 DIAGNOSIS — G47.30 SLEEP APNEA, UNSPECIFIED TYPE: ICD-10-CM

## 2023-02-03 DIAGNOSIS — K21.9 GASTROESOPHAGEAL REFLUX DISEASE, UNSPECIFIED WHETHER ESOPHAGITIS PRESENT: ICD-10-CM

## 2023-02-03 PROBLEM — K20.90 ESOPHAGITIS: Status: ACTIVE | Noted: 2023-02-03

## 2023-02-03 LAB — TSH SERPL DL<=0.05 MIU/L-ACNC: 3.14 UIU/ML (ref 0.27–4.2)

## 2023-02-03 PROCEDURE — 84443 ASSAY THYROID STIM HORMONE: CPT | Performed by: SURGERY

## 2023-02-03 PROCEDURE — 25010000002 PROPOFOL 10 MG/ML EMULSION: Performed by: NURSE ANESTHETIST, CERTIFIED REGISTERED

## 2023-02-03 PROCEDURE — 87081 CULTURE SCREEN ONLY: CPT | Performed by: SURGERY

## 2023-02-03 PROCEDURE — 88305 TISSUE EXAM BY PATHOLOGIST: CPT | Performed by: SURGERY

## 2023-02-03 RX ORDER — LIDOCAINE HYDROCHLORIDE 20 MG/ML
INJECTION, SOLUTION EPIDURAL; INFILTRATION; INTRACAUDAL; PERINEURAL AS NEEDED
Status: DISCONTINUED | OUTPATIENT
Start: 2023-02-03 | End: 2023-02-03 | Stop reason: SURG

## 2023-02-03 RX ORDER — PROPOFOL 10 MG/ML
VIAL (ML) INTRAVENOUS AS NEEDED
Status: DISCONTINUED | OUTPATIENT
Start: 2023-02-03 | End: 2023-02-03 | Stop reason: SURG

## 2023-02-03 RX ORDER — SODIUM CHLORIDE 0.9 % (FLUSH) 0.9 %
10 SYRINGE (ML) INJECTION AS NEEDED
Status: DISCONTINUED | OUTPATIENT
Start: 2023-02-03 | End: 2023-02-03 | Stop reason: HOSPADM

## 2023-02-03 RX ORDER — SODIUM CHLORIDE 9 MG/ML
500 INJECTION, SOLUTION INTRAVENOUS CONTINUOUS PRN
Status: DISCONTINUED | OUTPATIENT
Start: 2023-02-03 | End: 2023-02-03 | Stop reason: HOSPADM

## 2023-02-03 RX ORDER — OMEPRAZOLE 20 MG/1
20 CAPSULE, DELAYED RELEASE ORAL DAILY
Qty: 90 CAPSULE | Refills: 0 | Status: SHIPPED | OUTPATIENT
Start: 2023-02-03 | End: 2023-02-21

## 2023-02-03 RX ORDER — LIDOCAINE HYDROCHLORIDE 10 MG/ML
0.5 INJECTION, SOLUTION EPIDURAL; INFILTRATION; INTRACAUDAL; PERINEURAL ONCE AS NEEDED
Status: DISCONTINUED | OUTPATIENT
Start: 2023-02-03 | End: 2023-02-03 | Stop reason: HOSPADM

## 2023-02-03 RX ORDER — OMEPRAZOLE 20 MG/1
20 CAPSULE, DELAYED RELEASE ORAL DAILY
Qty: 30 CAPSULE | Refills: 0 | Status: SHIPPED | OUTPATIENT
Start: 2023-02-03 | End: 2023-02-03

## 2023-02-03 RX ADMIN — LIDOCAINE HYDROCHLORIDE 100 MG: 20 INJECTION, SOLUTION EPIDURAL; INFILTRATION; INTRACAUDAL; PERINEURAL at 09:47

## 2023-02-03 RX ADMIN — SODIUM CHLORIDE 500 ML: 9 INJECTION, SOLUTION INTRAVENOUS at 08:46

## 2023-02-03 RX ADMIN — PROPOFOL INJECTABLE EMULSION 200 MG: 10 INJECTION, EMULSION INTRAVENOUS at 09:47

## 2023-02-03 NOTE — BRIEF OP NOTE
ESOPHAGOGASTRODUODENOSCOPY WITH ANESTHESIA  Progress Note    Allison Mckeon  2/3/2023    Pre-op Diagnosis:   Class 3 severe obesity due to excess calories with serious comorbidity and body mass index (BMI) of 50.0 to 59.9 in adult ([facility]) [E66.01, Z68.43]  Secondary hypertension [I15.9]  Gastroesophageal reflux disease, unspecified whether esophagitis present [K21.9]  Sleep apnea, unspecified type [G47.30]       Post-Op Diagnosis Codes:     * Class 3 severe obesity due to excess calories with serious comorbidity and body mass index (BMI) of 50.0 to 59.9 in adult ([facility]) [E66.01, Z68.43]     * Secondary hypertension [I15.9]     * Gastroesophageal reflux disease, unspecified whether esophagitis present [K21.9]     * Sleep apnea, unspecified type [G47.30]     * Esophagitis determined by endoscopy [K20.90]    Procedure/CPT® Codes:        Procedure(s):  ESOPHAGOGASTRODUODENOSCOPY WITH ANESTHESIA        Surgeon(s):  Binh Michele MD    Anesthesia: Monitored Anesthesia Care    Staff:   Endo Technician: Mary Anne Martines  Endo Nurse: Rachel Lynn RN         Estimated Blood Loss: minimal    Urine Voided: * No values recorded between 2/3/2023  9:45 AM and 2/3/2023  9:53 AM *    Specimens:                Specimens     ID Source Type Tests Collected By Collected At Frozen?    1 Stomach Tissue · UREASE FOR H PYLORI, 24 HR   Binh Michele MD 2/3/23 0955     Description: bin    A Esophagus Tissue · TISSUE PATHOLOGY EXAM   Binh Michele MD 2/3/23 0979     Description: esophagus bx, ge junction            Findings: As above        Complications: None          Binh Michele MD     Date: 2/3/2023  Time: 09:53 CST

## 2023-02-03 NOTE — ANESTHESIA PREPROCEDURE EVALUATION
Anesthesia Evaluation     Patient summary reviewed   no history of anesthetic complications:  NPO Solid Status: > 8 hours             Airway   Mallampati: II  Dental      Pulmonary    (+) sleep apnea,   Cardiovascular   Exercise tolerance: excellent (>7 METS)    (+) hypertension,       Neuro/Psych- negative ROS  GI/Hepatic/Renal/Endo    (+) morbid obesity,      Musculoskeletal     Abdominal    Substance History      OB/GYN          Other                        Anesthesia Plan    ASA 3     MAC       Anesthetic plan, risks, benefits, and alternatives have been provided, discussed and informed consent has been obtained with: patient.        CODE STATUS:

## 2023-02-03 NOTE — DISCHARGE INSTRUCTIONS
Follow Dr. Michele's discharge instructions.      Take prilosec 20 mg daily on empty stomach for one month.

## 2023-02-03 NOTE — ANESTHESIA POSTPROCEDURE EVALUATION
Patient: Allison Mckeon    Procedure Summary     Date: 02/03/23 Room / Location: [facility] ENDOSCOPY 6 / [facility]    Anesthesia Start: 0945 Anesthesia Stop: 0954    Procedure: ESOPHAGOGASTRODUODENOSCOPY WITH ANESTHESIA Diagnosis:       Class 3 severe obesity due to excess calories with serious comorbidity and body mass index (BMI) of 50.0 to 59.9 in adult ([facility])      Secondary hypertension      Gastroesophageal reflux disease, unspecified whether esophagitis present      Sleep apnea, unspecified type      Esophagitis determined by endoscopy      (Class 3 severe obesity due to excess calories with serious comorbidity and body mass index (BMI) of 50.0 to 59.9 in adult (HCC) [E66.01, Z68.43])      (Secondary hypertension [I15.9])      (Gastroesophageal reflux disease, unspecified whether esophagitis present [K21.9])      (Sleep apnea, unspecified type [G47.30])    Surgeons: Binh Michele MD Provider: Damián Cameron CRNA    Anesthesia Type: MAC ASA Status: 3          Anesthesia Type: MAC    Vitals  Vitals Value Taken Time   BP 95/82 02/03/23 1006   Temp     Pulse 88 02/03/23 1009   Resp 15 02/03/23 1000   SpO2 94 % 02/03/23 1009   Vitals shown include unvalidated device data.        Post Anesthesia Care and Evaluation    Patient location during evaluation: PHASE II  Patient participation: complete - patient participated  Level of consciousness: awake  Pain management: adequate    Airway patency: patent  Anesthetic complications: No anesthetic complications  Respiratory status: acceptable  Hydration status: acceptable

## 2023-02-04 LAB — UREASE TISS QL: NEGATIVE

## 2023-02-06 LAB
CYTO UR: NORMAL
LAB AP CASE REPORT: NORMAL
Lab: NORMAL
PATH REPORT.FINAL DX SPEC: NORMAL
PATH REPORT.GROSS SPEC: NORMAL

## 2023-02-21 ENCOUNTER — OFFICE VISIT (OUTPATIENT)
Dept: BARIATRICS/WEIGHT MGMT | Facility: CLINIC | Age: 43
End: 2023-02-21
Payer: COMMERCIAL

## 2023-02-21 VITALS
OXYGEN SATURATION: 95 % | BODY MASS INDEX: 47.09 KG/M2 | WEIGHT: 293 LBS | TEMPERATURE: 97.5 F | DIASTOLIC BLOOD PRESSURE: 88 MMHG | HEART RATE: 75 BPM | HEIGHT: 66 IN | SYSTOLIC BLOOD PRESSURE: 149 MMHG

## 2023-02-21 DIAGNOSIS — K21.9 GASTROESOPHAGEAL REFLUX DISEASE, UNSPECIFIED WHETHER ESOPHAGITIS PRESENT: ICD-10-CM

## 2023-02-21 DIAGNOSIS — G47.30 SLEEP APNEA, UNSPECIFIED TYPE: ICD-10-CM

## 2023-02-21 DIAGNOSIS — I15.9 SECONDARY HYPERTENSION: ICD-10-CM

## 2023-02-21 DIAGNOSIS — E66.01 CLASS 3 SEVERE OBESITY DUE TO EXCESS CALORIES WITH SERIOUS COMORBIDITY AND BODY MASS INDEX (BMI) OF 50.0 TO 59.9 IN ADULT: Primary | ICD-10-CM

## 2023-02-21 PROCEDURE — 99213 OFFICE O/P EST LOW 20 MIN: CPT | Performed by: NURSE PRACTITIONER

## 2023-02-21 NOTE — PROGRESS NOTES
"Patient Care Team:  Provider, No Known as PCP - General    Reason for Visit:  Surgical Weight loss, V4    Subjective   Allison Mckeon is a 42 y.o. female.     Allison is here for follow-up and continued medical management of her morbid obesity.  She is currently on a prescription diet.  Allison previously was to apply dietary changes such as following the meal plan as directed.  She admits to eating 4 meals per day.  As a result she remained the same weight since her last visit.    She states she had a lot of significant side effects from the medication. She state she had a rash, abdominal pain, and constipation. She states she mostly ate small carb snacks due to abdominal issues.     Review Of Systems:  Review of Systems   Constitutional: Negative.    Respiratory: Negative.    Cardiovascular: Negative.    Gastrointestinal: Positive for constipation.   Endocrine: Negative.    Musculoskeletal: Negative.    Psychiatric/Behavioral: Negative.          The following portions of the patient's history were reviewed and updated as appropriate: allergies, current medications, past family history, past medical history, past social history, past surgical history, and problem list.    Objective   /88 (BP Location: Right arm, Patient Position: Sitting, Cuff Size: Adult)   Pulse 75   Temp 97.5 °F (36.4 °C)   Ht 167.6 cm (66\")   Wt (!) 154 kg (338 lb 9.6 oz)   SpO2 95%   BMI 54.65 kg/m²       02/21/23  0824   Weight: (!) 154 kg (338 lb 9.6 oz)       Physical Exam  Vitals reviewed.   Constitutional:       Appearance: She is obese.   Cardiovascular:      Rate and Rhythm: Normal rate and regular rhythm.   Pulmonary:      Effort: Pulmonary effort is normal.   Abdominal:      General: Bowel sounds are normal.      Palpations: Abdomen is soft.   Musculoskeletal:         General: Normal range of motion.   Skin:     General: Skin is warm and dry.   Neurological:      Mental Status: She is alert and oriented to person, place, and " time.   Psychiatric:         Mood and Affect: Mood normal.         Behavior: Behavior normal.         Class 3 Severe Obesity (BMI >=40). Obesity-related health conditions include the following: obstructive sleep apnea, hypertension and GERD. Obesity is improving with treatment. BMI is is above average; BMI management plan is completed. We discussed portion control and increasing exercise.     UPPER GI ENDOSCOPY (02/03/2023 09:41)  ECG 12 Lead (12/12/2022 10:42)  Urease For H Pylori - Tissue, Stomach (02/03/2023 09:45)  TSH (02/03/2023 08:37)      Assessment & Plan   Diagnoses and all orders for this visit:    1. Class 3 severe obesity due to excess calories with serious comorbidity and body mass index (BMI) of 50.0 to 59.9 in adult (HCC) (Primary)  Assessment & Plan:  Patient's (Body mass index is 54.65 kg/m².) indicates that they are morbidly/severely obese (BMI > 40 or > 35 with obesity - related health condition) with health conditions that include obstructive sleep apnea and hypertension . Weight is improving with treatment. BMI  is above average; BMI management plan is completed. We discussed portion control and increasing exercise.       2. Secondary hypertension  Comments:  BP elevated today, recommend watch BP levels at home     3. Sleep apnea, unspecified type  Comments:  Does not wear CPAP- does not tolerate     4. Gastroesophageal reflux disease, unspecified whether esophagitis present  Comments:  Will discuss medication with Dr Michele, d/c omeprazole due to s/e        Allison Mckeon was seen today for follow-up, obesity, nutrition counseling and weight loss.  She has remained the same weight since her last visit.  Today we discussed healthy changes in lifestyle, diet, and exercise. Dietician consultation obtained.  Allison Mckeon had received handouts to her explaining the recommendation on portion sizes/appetite control/reading nutrition labels.   Intensive behavioral therapy for obesity was done today as  well.     Goals for this month are:   1. Patient needs to have letter sent from psychiatry and states she was seen 2 months ago.   2. Patient will have EKG done today   3. Increase fiber intake   4. Log meals and bring food journal into next appointment.      Follow up in one month for a weight recheck.A total of 20 minutes was spent face to face with this patient and over half of the time was spent on counseling and coordination of care for the disease of obesity. We specifically reviewed the dietary prescription and I made recommendations toward increasing exercise as tolerated as well as focusing on training their behavior toward storing less.

## 2023-02-21 NOTE — ASSESSMENT & PLAN NOTE
Patient's (Body mass index is 54.65 kg/m².) indicates that they are morbidly/severely obese (BMI > 40 or > 35 with obesity - related health condition) with health conditions that include obstructive sleep apnea and hypertension . Weight is improving with treatment. BMI  is above average; BMI management plan is completed. We discussed portion control and increasing exercise.

## 2023-02-24 ENCOUNTER — HOSPITAL ENCOUNTER (OUTPATIENT)
Dept: CARDIOLOGY | Facility: HOSPITAL | Age: 43
Discharge: HOME | End: 2023-02-24
Admitting: NURSE PRACTITIONER
Payer: COMMERCIAL

## 2023-02-24 DIAGNOSIS — I15.9 SECONDARY HYPERTENSION: ICD-10-CM

## 2023-02-24 DIAGNOSIS — E66.01 CLASS 3 SEVERE OBESITY DUE TO EXCESS CALORIES WITH SERIOUS COMORBIDITY AND BODY MASS INDEX (BMI) OF 50.0 TO 59.9 IN ADULT: ICD-10-CM

## 2023-02-24 PROCEDURE — 93010 ELECTROCARDIOGRAM REPORT: CPT | Performed by: INTERNAL MEDICINE

## 2023-02-24 PROCEDURE — 93005 ELECTROCARDIOGRAM TRACING: CPT | Performed by: NURSE PRACTITIONER

## 2023-02-25 LAB
QT INTERVAL: 342 MS
QTC INTERVAL: 456 MS

## 2023-03-02 ENCOUNTER — OFFICE VISIT (OUTPATIENT)
Dept: INTERNAL MEDICINE | Facility: CLINIC | Age: 43
End: 2023-03-02
Payer: COMMERCIAL

## 2023-03-02 VITALS
DIASTOLIC BLOOD PRESSURE: 84 MMHG | WEIGHT: 293 LBS | BODY MASS INDEX: 47.09 KG/M2 | HEART RATE: 84 BPM | HEIGHT: 66 IN | TEMPERATURE: 97.1 F | OXYGEN SATURATION: 99 % | SYSTOLIC BLOOD PRESSURE: 122 MMHG

## 2023-03-02 DIAGNOSIS — Z11.59 NEED FOR HEPATITIS C SCREENING TEST: ICD-10-CM

## 2023-03-02 DIAGNOSIS — R42 DIZZINESS: ICD-10-CM

## 2023-03-02 DIAGNOSIS — Z01.419 VISIT FOR PELVIC EXAM: ICD-10-CM

## 2023-03-02 DIAGNOSIS — E53.8 VITAMIN B12 DEFICIENCY: ICD-10-CM

## 2023-03-02 DIAGNOSIS — K20.90 ESOPHAGITIS DETERMINED BY ENDOSCOPY: ICD-10-CM

## 2023-03-02 DIAGNOSIS — E55.9 VITAMIN D INSUFFICIENCY: ICD-10-CM

## 2023-03-02 DIAGNOSIS — Z00.00 ANNUAL PHYSICAL EXAM: Primary | ICD-10-CM

## 2023-03-02 DIAGNOSIS — F41.9 ANXIETY: ICD-10-CM

## 2023-03-02 DIAGNOSIS — I10 PRIMARY HYPERTENSION: ICD-10-CM

## 2023-03-02 DIAGNOSIS — B35.1 ONYCHOMYCOSIS: ICD-10-CM

## 2023-03-02 PROCEDURE — 99214 OFFICE O/P EST MOD 30 MIN: CPT | Performed by: NURSE PRACTITIONER

## 2023-03-02 RX ORDER — LOSARTAN POTASSIUM 100 MG/1
100 TABLET ORAL DAILY
Qty: 30 TABLET | Refills: 3 | Status: SHIPPED | OUTPATIENT
Start: 2023-03-02

## 2023-03-02 RX ORDER — CLOTRIMAZOLE AND BETAMETHASONE DIPROPIONATE 10; .64 MG/G; MG/G
1 CREAM TOPICAL 2 TIMES DAILY
COMMUNITY

## 2023-03-02 RX ORDER — ACYCLOVIR 50 MG/G
1 CREAM TOPICAL
COMMUNITY

## 2023-03-02 RX ORDER — TRAZODONE HYDROCHLORIDE 50 MG/1
50 TABLET ORAL NIGHTLY
COMMUNITY

## 2023-03-02 RX ORDER — BUSPIRONE HYDROCHLORIDE 7.5 MG/1
7.5 TABLET ORAL 3 TIMES DAILY PRN
Qty: 90 TABLET | Refills: 0 | Status: SHIPPED | OUTPATIENT
Start: 2023-03-02

## 2023-03-02 RX ORDER — CYANOCOBALAMIN 1000 UG/ML
1000 INJECTION, SOLUTION INTRAMUSCULAR; SUBCUTANEOUS WEEKLY
COMMUNITY
End: 2023-03-03 | Stop reason: SDUPTHER
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
## 2023-03-03 LAB
25(OH)D3+25(OH)D2 SERPL-MCNC: 35.7 NG/ML (ref 30–100)
ALBUMIN SERPL-MCNC: 4.5 G/DL (ref 3.5–5.2)
ALBUMIN/GLOB SERPL: 1.8 G/DL
ALP SERPL-CCNC: 69 U/L (ref 39–117)
ALT SERPL-CCNC: 56 U/L (ref 1–33)
APPEARANCE UR: ABNORMAL
AST SERPL-CCNC: 29 U/L (ref 1–32)
BASOPHILS # BLD AUTO: 0.03 10*3/MM3 (ref 0–0.2)
BASOPHILS NFR BLD AUTO: 0.3 % (ref 0–1.5)
BILIRUB SERPL-MCNC: 0.3 MG/DL (ref 0–1.2)
BILIRUB UR QL STRIP: NEGATIVE
BUN SERPL-MCNC: 11 MG/DL (ref 6–20)
BUN/CREAT SERPL: 16.4 (ref 7–25)
CALCIUM SERPL-MCNC: 9.1 MG/DL (ref 8.6–10.5)
CHLORIDE SERPL-SCNC: 102 MMOL/L (ref 98–107)
CHOLEST SERPL-MCNC: 197 MG/DL (ref 0–200)
CO2 SERPL-SCNC: 28.6 MMOL/L (ref 22–29)
COLOR UR: ABNORMAL
CREAT SERPL-MCNC: 0.67 MG/DL (ref 0.57–1)
EGFRCR SERPLBLD CKD-EPI 2021: 110.7 ML/MIN/1.73
EOSINOPHIL # BLD AUTO: 0.08 10*3/MM3 (ref 0–0.4)
EOSINOPHIL NFR BLD AUTO: 0.7 % (ref 0.3–6.2)
ERYTHROCYTE [DISTWIDTH] IN BLOOD BY AUTOMATED COUNT: 13.4 % (ref 12.3–15.4)
GLOBULIN SER CALC-MCNC: 2.5 GM/DL
GLUCOSE SERPL-MCNC: 98 MG/DL (ref 65–99)
GLUCOSE UR QL STRIP: NEGATIVE
HCT VFR BLD AUTO: 43.6 % (ref 34–46.6)
HCV IGG SERPL QL IA: NON REACTIVE
HDLC SERPL-MCNC: 61 MG/DL (ref 40–60)
HGB BLD-MCNC: 14.4 G/DL (ref 12–15.9)
HGB UR QL STRIP: NEGATIVE
IMM GRANULOCYTES # BLD AUTO: 0.04 10*3/MM3 (ref 0–0.05)
IMM GRANULOCYTES NFR BLD AUTO: 0.4 % (ref 0–0.5)
KETONES UR QL STRIP: NEGATIVE
LDLC SERPL CALC-MCNC: 120 MG/DL (ref 0–100)
LEUKOCYTE ESTERASE UR QL STRIP: NEGATIVE
LYMPHOCYTES # BLD AUTO: 3.09 10*3/MM3 (ref 0.7–3.1)
LYMPHOCYTES NFR BLD AUTO: 28.2 % (ref 19.6–45.3)
MCH RBC QN AUTO: 28 PG (ref 26.6–33)
MCHC RBC AUTO-ENTMCNC: 33 G/DL (ref 31.5–35.7)
MCV RBC AUTO: 84.8 FL (ref 79–97)
MONOCYTES # BLD AUTO: 0.74 10*3/MM3 (ref 0.1–0.9)
MONOCYTES NFR BLD AUTO: 6.8 % (ref 5–12)
NEUTROPHILS # BLD AUTO: 6.97 10*3/MM3 (ref 1.7–7)
NEUTROPHILS NFR BLD AUTO: 63.6 % (ref 42.7–76)
NITRITE UR QL STRIP: NEGATIVE
NRBC BLD AUTO-RTO: 0 /100 WBC (ref 0–0.2)
PH UR STRIP: 5.5 [PH] (ref 5–8)
PLATELET # BLD AUTO: 327 10*3/MM3 (ref 140–450)
POTASSIUM SERPL-SCNC: 4.1 MMOL/L (ref 3.5–5.2)
PROT SERPL-MCNC: 7 G/DL (ref 6–8.5)
PROT UR QL STRIP: ABNORMAL
RBC # BLD AUTO: 5.14 10*6/MM3 (ref 3.77–5.28)
SODIUM SERPL-SCNC: 143 MMOL/L (ref 136–145)
SP GR UR STRIP: ABNORMAL (ref 1–1.03)
TRIGL SERPL-MCNC: 88 MG/DL (ref 0–150)
UROBILINOGEN UR STRIP-MCNC: ABNORMAL MG/DL
VIT B12 SERPL-MCNC: 786 PG/ML (ref 211–946)
VLDLC SERPL CALC-MCNC: 16 MG/DL (ref 5–40)
WBC # BLD AUTO: 10.95 10*3/MM3 (ref 3.4–10.8)

## 2023-03-03 RX ORDER — ERGOCALCIFEROL 1.25 MG/1
50000 CAPSULE ORAL WEEKLY
Qty: 12 CAPSULE | Refills: 2 | Status: SHIPPED | OUTPATIENT
Start: 2023-03-03 | End: 2023-03-03

## 2023-03-03 RX ORDER — ERGOCALCIFEROL 1.25 MG/1
50000 CAPSULE ORAL WEEKLY
Qty: 12 CAPSULE | Refills: 3 | Status: SHIPPED | OUTPATIENT
Start: 2023-03-03

## 2023-03-03 NOTE — PROGRESS NOTES
Kidney function and electrolytes look great. One of her liver enzymes is elevated which she says she has a history of. We will continue to monitor this. LDL cholesterol is slightly elevated, which I suspect will continue to improve with her diet and potential bariatric surgery in the future so do not feel any medication would be necessary at this point. Vitamin D level is on the loer end of normal so would continue weekly supplement, refill has been sent. Vitamin B12 level looks good, does patient need refill of this?

## 2023-03-16 ENCOUNTER — OFFICE VISIT (OUTPATIENT)
Dept: INTERNAL MEDICINE | Facility: CLINIC | Age: 43
End: 2023-03-16
Payer: COMMERCIAL

## 2023-03-16 VITALS
DIASTOLIC BLOOD PRESSURE: 74 MMHG | OXYGEN SATURATION: 97 % | WEIGHT: 293 LBS | SYSTOLIC BLOOD PRESSURE: 120 MMHG | BODY MASS INDEX: 47.09 KG/M2 | HEART RATE: 92 BPM | TEMPERATURE: 96.9 F | HEIGHT: 66 IN

## 2023-03-16 DIAGNOSIS — R19.5 LOOSE STOOLS: ICD-10-CM

## 2023-03-16 DIAGNOSIS — F41.9 ANXIETY AND DEPRESSION: Primary | ICD-10-CM

## 2023-03-16 DIAGNOSIS — H65.92 FLUID LEVEL BEHIND TYMPANIC MEMBRANE OF LEFT EAR: ICD-10-CM

## 2023-03-16 DIAGNOSIS — J01.90 ACUTE NON-RECURRENT SINUSITIS, UNSPECIFIED LOCATION: ICD-10-CM

## 2023-03-16 DIAGNOSIS — F32.A ANXIETY AND DEPRESSION: Primary | ICD-10-CM

## 2023-03-16 PROCEDURE — 96360 HYDRATION IV INFUSION INIT: CPT | Performed by: NURSE PRACTITIONER

## 2023-03-16 PROCEDURE — 99213 OFFICE O/P EST LOW 20 MIN: CPT | Performed by: NURSE PRACTITIONER

## 2023-03-16 RX ORDER — METHYLPREDNISOLONE 4 MG/1
TABLET ORAL
Qty: 21 TABLET | Refills: 0 | Status: SHIPPED | OUTPATIENT
Start: 2023-03-16

## 2023-03-16 RX ORDER — MONTELUKAST SODIUM 10 MG/1
10 TABLET ORAL NIGHTLY
COMMUNITY

## 2023-03-16 RX ADMIN — SODIUM CHLORIDE 1000 ML/HR: 9 INJECTION, SOLUTION INTRAVENOUS at 15:00

## 2023-03-17 PROBLEM — F32.A ANXIETY AND DEPRESSION: Status: ACTIVE | Noted: 2023-03-17

## 2023-03-17 PROBLEM — F41.9 ANXIETY AND DEPRESSION: Status: ACTIVE | Noted: 2023-03-17

## 2023-03-17 NOTE — PROGRESS NOTES
Subjective     Chief Complaint:  Sinus drainage.  Loose stools.    HPI:  The patient presents to the office today for 2-week follow-up regarding anxiety, dizziness and palpitations.  The patient states since her last office visit she has actually noted resolution of both dizziness and palpitations.  During her last office visit she had forgotten to mention that she felt as though she was recently getting over a stomach virus which she now thinks may have been causing the dizziness and palpitations.  She has continued to have loose stools since having the stomach virus, but not diarrhea.  She feels that she is eating and drinking fairly well but is unsure if she is drinking enough to keep up with loose stools and she feels dehydrated.  She has had no abdominal pain, nausea/vomiting.    During her last office visit, we did start her on BuSpar as needed for anxiety.  She states that she is typically taking this twice per day and she does feel as though it is working well.  She continues on Zoloft.  We had discussed discontinuing her trazodone at her last office visit as she was not taking this consistently as it made her feel hung over in the morning.    The patient states that she has had increased sinus drainage and ear fullness for the last few days.  She has not noted any drainage from her ears.  She denies any cough/congestion, sore throat, shortness of breath.  She has had no fever or chills.  She has not been taking any over-the-counter medications for her symptoms but does take Singulair when needed and has been taking this for the last few days.    Patient's PMR from outside medical facility reviewed and noted.    Past Medical History:   Past Medical History:   Diagnosis Date   • ADHD (attention deficit hyperactivity disorder) 1991   • Allergic 1996   • Anxiety 1994   • Asthma 2010   • Cholelithiasis 2003   • Colon polyp 2010   • Depression 1996   • Fibromyalgia, primary 2012   • GERD (gastroesophageal  reflux disease)    • Glaucoma     Elevated Pressure   • Headache    • Hernia    • Hypertension    • Sleep apnea      Past Surgical History:  Past Surgical History:   Procedure Laterality Date   • ADENOIDECTOMY     •  SECTION     • CHOLECYSTECTOMY     • COLONOSCOPY     • ENDOMETRIAL ABLATION     • ENDOSCOPY N/A 2023    Procedure: ESOPHAGOGASTRODUODENOSCOPY WITH ANESTHESIA;  Surgeon: Binh Michele MD;  Location: [facility] ENDOSCOPY;  Service: General;  Laterality: N/A;  pre screen  post esophagitis  DrJhon    • HYSTERECTOMY     • SINUS SURGERY     • SUBTOTAL HYSTERECTOMY     • TONSILLECTOMY     • TUBAL ABDOMINAL LIGATION      Reversed in      Social History:  reports that she has never smoked. She has never used smokeless tobacco. She reports current alcohol use of about 2.0 standard drinks per week. She reports that she does not use drugs.    Family History: family history includes Asthma in her mother; Cancer in her maternal aunt, maternal grandfather, maternal grandmother, and paternal grandmother; Colon cancer in her maternal grandmother; Depression in her father; Diabetes in her paternal grandmother; Early death in her paternal aunt and paternal grandfather; Heart disease in her maternal grandmother; Learning disabilities in her father and son; Lung cancer in her maternal grandmother; Mental illness in her father; Pancreatic cancer in her maternal grandfather; Thyroid disease in her maternal grandfather and mother; Vision loss in her paternal grandmother. She was adopted.      Allergies:  Allergies   Allergen Reactions   • Avocado Anaphylaxis   • Promethazine Itching   • Prilosec [Omeprazole] Rash, Other (See Comments) and GI Intolerance     Achy joints     Medications:  Prior to Admission medications    Medication Sig Start Date End Date Taking? Authorizing Provider   acyclovir (ZOVIRAX) 5 % cream Apply 1 application topically to the appropriate area as  "directed Every 3 (Three) Hours.   Yes ProviderAníbal MD   busPIRone (BUSPAR) 7.5 MG tablet Take 1 tablet by mouth 3 (Three) Times a Day As Needed (anxiety). 3/2/23  Yes Wendy Andrade APRN   ciclopirox (PENLAC) 8 % solution Apply  topically to the appropriate area as directed Every Night. 3/2/23  Yes Wendy Andrade APRN   clotrimazole-betamethasone (LOTRISONE) 1-0.05 % cream Apply 1 application topically to the appropriate area as directed 2 (Two) Times a Day.   Yes Aníbal Beltran MD   cyanocobalamin 1000 MCG/ML injection Inject 1 mL into the appropriate muscle as directed by prescriber 1 (One) Time Per Week. 3/3/23  Yes Wendy Andrade APRN   losartan (COZAAR) 100 MG tablet Take 1 tablet by mouth Daily. 3/2/23  Yes Wendy Andrade APRN   methylphenidate 36 MG CR tablet Take 1 tablet by mouth Daily 11/3/22  Yes ProviderAníbal MD   montelukast (SINGULAIR) 10 MG tablet Take 1 tablet by mouth Every Night.   Yes ProviderAníbal MD   sertraline (ZOLOFT) 100 MG tablet Take 50 mg by mouth Daily. 8/12/22  Yes Aníbal Beltran MD   traZODone (DESYREL) 50 MG tablet Take 1 tablet by mouth Every Night.   Yes ProviderAníbal MD   vitamin D (ERGOCALCIFEROL) 1.25 MG (23333 UT) capsule capsule Take 1 capsule by mouth 1 (One) Time Per Week. 3/3/23  Yes Wendy Andrade APRN   methylPREDNISolone (MEDROL) 4 MG dose pack Take as directed on package instructions. 3/16/23   Wendy Andrade APRN       Objective     Vital Signs: /74 (BP Location: Left arm, Patient Position: Sitting, Cuff Size: Adult)   Pulse 92   Temp 96.9 °F (36.1 °C) (Temporal)   Ht 167.6 cm (66\")   Wt (!) 155 kg (342 lb)   SpO2 97%   BMI 55.20 kg/m²   Physical Exam  Vitals and nursing note reviewed.   Constitutional:       General: She is not in acute distress.     Appearance: She is obese. She is not ill-appearing or toxic-appearing.   HENT:      Head: Normocephalic and atraumatic.      Right " Ear: Ear canal and external ear normal. Tympanic membrane has decreased mobility.      Left Ear: Ear canal and external ear normal. A middle ear effusion (serous) is present. Tympanic membrane has decreased mobility.      Mouth/Throat:      Mouth: Mucous membranes are moist.      Pharynx: Oropharynx is clear.   Cardiovascular:      Rate and Rhythm: Normal rate and regular rhythm.      Pulses: Normal pulses.      Heart sounds: Normal heart sounds.   Pulmonary:      Effort: Pulmonary effort is normal.      Breath sounds: No wheezing, rhonchi or rales.   Abdominal:      General: Bowel sounds are normal. There is no distension.      Palpations: Abdomen is soft.      Tenderness: There is no abdominal tenderness.   Musculoskeletal:         General: No swelling or tenderness. Normal range of motion.      Cervical back: Normal range of motion and neck supple. No tenderness.   Skin:     General: Skin is warm and dry.      Findings: No erythema or rash.   Neurological:      General: No focal deficit present.      Mental Status: She is alert and oriented to person, place, and time.   Psychiatric:         Mood and Affect: Mood normal.         Behavior: Behavior normal.         Thought Content: Thought content normal.         Judgment: Judgment normal.       Assessment / Plan     Assessment/Plan:  Diagnoses and all orders for this visit:    1. Anxiety and depression (Primary)    2. Loose stools  -     sodium chloride 0.9 % infusion    3. Acute non-recurrent sinusitis, unspecified location  -     methylPREDNISolone (MEDROL) 4 MG dose pack; Take as directed on package instructions.  Dispense: 21 tablet; Refill: 0    4. Fluid level behind tympanic membrane of left ear  -     methylPREDNISolone (MEDROL) 4 MG dose pack; Take as directed on package instructions.  Dispense: 21 tablet; Refill: 0       Patient presents to the office today for a 2-week follow-up.  During her last office visit she was started on BuSpar as needed for  anxiety, which she feels is working well at this time.  She continues on Zoloft.  As she had not been taking trazodone on a regular basis and it gave her a hung over feeling, we had discussed discontinuing this.    At the patient's last office visit she had also been having some intermittent dizziness and palpitations.  She had not mentioned at her last office visit that she had concern that she had a stomach virus, which she now feels may have been causing the symptoms as they have since resolved.  She is still having some difficulty with loose stools.  Denies nausea, vomiting, diarrhea.  She states that her oral intake has improved but she is unsure if she is still eating and drinking enough to keep up with losses from loose stools.  She feels dehydrated.    IV Administration    Patient verbally consented to IV fluid administration to be done at ambulatory facility. An IV was placed in the L Antecubital using a20 gauge needle and 1 attempt(s). Blood return was noted and the site was flushed without difficulty. The catheter/tubing was secured using heplock and Tegaderm. The IV was started at 1500 and removed at 1600. A total of 1000 cc of normal saline was administered. After removal of the intact IV catheter, a guaze with tape was applied and patient was given instructions for care along with possible side effects for which to monitor.    The patient tolerated the procedure without complication.     The patient is having some increased sinus drainage and ear fullness.  She has had no fever or chills.  She denies drainage from her ears.  She denies cough/congestion, sore throat or shortness of breath.  She will continue home medication Singulair and I did advise an over-the-counter antihistamine such as Claritin or Zyrtec, which the patient states she has at home.  We will treat with Medrol Dosepak as well.  Patient to call for any worsening or no improvement of symptoms.    Return in about 6 months (around  9/16/2023) for Recheck. unless patient needs to be seen sooner or acute issues arise.    I have discussed the patient results/orders and and plan/recommendation with them at today's visit.      Wendy Andrade, EZIO   03/16/2023    Answers for HPI/ROS submitted by the patient on 3/16/2023  Please describe your symptoms.: Follow up and sinus congestion  Have you had these symptoms before?: Yes  How long have you been having these symptoms?: 5-7 days  What is the primary reason for your visit?: Other

## 2023-03-20 ENCOUNTER — PATIENT ROUNDING (BHMG ONLY) (OUTPATIENT)
Dept: INTERNAL MEDICINE | Facility: CLINIC | Age: 43
End: 2023-03-20
Payer: COMMERCIAL

## 2023-03-20 NOTE — PROGRESS NOTES
A My-Chart message has been sent to the patient [place] [facility] [place]   
[phone]

## 2023-03-22 RX ORDER — SODIUM CHLORIDE 9 MG/ML
1000 INJECTION, SOLUTION INTRAVENOUS ONCE
Status: COMPLETED | OUTPATIENT
Start: 2023-03-16 | End: 2023-03-16

## 2023-03-29 ENCOUNTER — OFFICE VISIT (OUTPATIENT)
Dept: BARIATRICS/WEIGHT MGMT | Facility: CLINIC | Age: 43
End: 2023-03-29
Payer: COMMERCIAL

## 2023-03-29 VITALS
BODY MASS INDEX: 47.09 KG/M2 | HEIGHT: 66 IN | DIASTOLIC BLOOD PRESSURE: 64 MMHG | HEART RATE: 105 BPM | SYSTOLIC BLOOD PRESSURE: 107 MMHG | WEIGHT: 293 LBS | TEMPERATURE: 97.8 F | OXYGEN SATURATION: 99 %

## 2023-03-29 DIAGNOSIS — E66.01 CLASS 3 SEVERE OBESITY DUE TO EXCESS CALORIES WITH SERIOUS COMORBIDITY AND BODY MASS INDEX (BMI) OF 50.0 TO 59.9 IN ADULT: Primary | ICD-10-CM

## 2023-03-29 DIAGNOSIS — G47.30 SLEEP APNEA, UNSPECIFIED TYPE: ICD-10-CM

## 2023-03-29 DIAGNOSIS — K21.9 GASTROESOPHAGEAL REFLUX DISEASE, UNSPECIFIED WHETHER ESOPHAGITIS PRESENT: ICD-10-CM

## 2023-03-29 DIAGNOSIS — I10 PRIMARY HYPERTENSION: ICD-10-CM

## 2023-03-29 PROCEDURE — 99213 OFFICE O/P EST LOW 20 MIN: CPT | Performed by: SURGERY

## 2023-03-29 RX ORDER — CYCLOBENZAPRINE HCL 10 MG
10 TABLET ORAL 3 TIMES DAILY PRN
COMMUNITY

## 2023-03-29 NOTE — PROGRESS NOTES
"Patient Care Team:  Wendy Andrade APRN as PCP - General (Nurse Practitioner)    Reason for Visit:  Surgical Weight loss      Subjective   Allison Mckeon is a 42 y.o. female.     Allison is here for follow-up and continued medical management of her morbid obesity.  She is currently on a prescription diet.  Allison previously was to apply dietary changes such as following the meal plan as directed.  She admits to traveling this past month with the meal prescription.  She is eating 3-4 meals a day however the patient admits that she had a recent fall and injured her right knee.  She has since been stable but is going to be seeing physical therapy.  As a result she no significant change in weight since her last visit.    Review Of Systems:  General ROS: positive for  - fatigue and sleep disturbance  Respiratory ROS: no cough, shortness of breath, or wheezing  Cardiovascular ROS: no chest pain or dyspnea on exertion  Gastrointestinal ROS: no abdominal pain, change in bowel habits, or black or bloody stools    The following portions of the patient's history were reviewed and updated as appropriate: allergies, current medications, past family history, past medical history, past social history, past surgical history and problem list.    Objective   /64 (BP Location: Right arm, Patient Position: Sitting, Cuff Size: Adult)   Pulse 105   Temp 97.8 °F (36.6 °C)   Ht 167.6 cm (66\")   Wt (!) 154 kg (339 lb 9.6 oz)   SpO2 99%   BMI 54.81 kg/m²       03/29/23  1007   Weight: (!) 154 kg (339 lb 9.6 oz)           General Appearance:  awake, alert, oriented, in no acute distress      Assessment & Plan     Encounter Diagnoses   Name Primary?   • Class 3 severe obesity due to excess calories with serious comorbidity and body mass index (BMI) of 50.0 to 59.9 in adult ([facility]) Yes   • Primary hypertension    • Gastroesophageal reflux disease, unspecified whether esophagitis present    • Sleep apnea, unspecified type      A " total of 20 minutes was spent face-to-face with this patient during this encounter and over half the time was spent on counseling and coordination of care of her morbid obesity.  Allison Mckeon was seen today for follow-up, obesity, nutrition counseling and weight loss.  She has no significant change in weight since her last visit.  Today we discussed healthy changes in lifestyle, diet, and exercise. Dietician consultation obtained.  Allison Mckeon had received handouts to her explaining the recommendation on portion sizes/appetite control/reading nutrition labels.   Intensive behavioral therapy for obesity was done today as well.   Goals for this month are: I recommended the patient restart her meal prepping which she has in place.  She is going to also see psych this month for her evaluation.  Upon completion of these tasks she will be scheduled for surgery accordingly.    Follow up in one month for a weight recheck.

## 2023-04-13 ENCOUNTER — HOSPITAL ENCOUNTER (OUTPATIENT)
Dept: ULTRASOUND IMAGING | Facility: HOSPITAL | Age: 43
Discharge: HOME | End: 2023-04-13
Admitting: NURSE PRACTITIONER
Payer: COMMERCIAL

## 2023-04-13 ENCOUNTER — OFFICE VISIT (OUTPATIENT)
Dept: INTERNAL MEDICINE | Facility: CLINIC | Age: 43
End: 2023-04-13
Payer: COMMERCIAL

## 2023-04-13 VITALS
OXYGEN SATURATION: 97 % | WEIGHT: 293 LBS | HEIGHT: 66 IN | HEART RATE: 121 BPM | BODY MASS INDEX: 47.09 KG/M2 | SYSTOLIC BLOOD PRESSURE: 152 MMHG | DIASTOLIC BLOOD PRESSURE: 90 MMHG | TEMPERATURE: 97.1 F

## 2023-04-13 DIAGNOSIS — M25.561 POSTERIOR RIGHT KNEE PAIN: ICD-10-CM

## 2023-04-13 DIAGNOSIS — M79.89 RIGHT LEG SWELLING: Primary | ICD-10-CM

## 2023-04-13 DIAGNOSIS — M79.89 RIGHT LEG SWELLING: ICD-10-CM

## 2023-04-13 DIAGNOSIS — I10 PRIMARY HYPERTENSION: ICD-10-CM

## 2023-04-13 PROCEDURE — 93971 EXTREMITY STUDY: CPT

## 2023-04-13 PROCEDURE — 99214 OFFICE O/P EST MOD 30 MIN: CPT | Performed by: NURSE PRACTITIONER

## 2023-04-13 RX ORDER — OXYCODONE HYDROCHLORIDE AND ACETAMINOPHEN 5; 325 MG/1; MG/1
1 TABLET ORAL EVERY 6 HOURS PRN
Qty: 12 TABLET | Refills: 0 | Status: SHIPPED | OUTPATIENT
Start: 2023-04-13

## 2023-04-13 RX ORDER — AMLODIPINE BESYLATE 5 MG/1
5 TABLET ORAL DAILY
Qty: 90 TABLET | Refills: 3 | Status: SHIPPED | OUTPATIENT
Start: 2023-04-13 | End: 2023-04-18

## 2023-04-13 RX ORDER — IBUPROFEN 800 MG/1
800 TABLET ORAL EVERY 6 HOURS PRN
COMMUNITY

## 2023-04-13 RX ORDER — AMLODIPINE BESYLATE 5 MG/1
5 TABLET ORAL DAILY
Qty: 30 TABLET | Refills: 1 | Status: SHIPPED | OUTPATIENT
Start: 2023-04-13 | End: 2023-04-13

## 2023-04-13 NOTE — PROGRESS NOTES
Subjective     Chief Complaint:  Elevated blood pressure. Right knee pain.     HPI:  Patient presents to the office today with complaints of elevated blood pressure and right knee pain.  When she was on a work trip 3 weeks ago [place] she slipped and twisted her right knee.  She has been seen at the orthopedic [facility] and indicates that she was diagnosed with a torn meniscus and states she may have either broken or dislocated her kneecap.  She has been performing physical therapy.  She has been walking on a crutch but does indicate that she has not been nearly as active as she normally is.  She complains of pain behind her right knee which is radiating up her posterior thigh and down into her calf.  She feels that she has had some swelling in the right leg as well.  She has been taking ibuprofen 800 mg 3 times per day which is not helping control her pain.  She denies any numbness or tingling to the right lower extremity.    The patient states for the last few days she has not been feeling very well.  She indicates shakiness and dizziness.  She has had some occipital headaches.  No associated vision changes, nausea/vomiting, photophobia/phonophobia.  No chest pain or shortness of breath.  Due to continuing to feel unwell the patient did check her blood pressure at home today.  At 11 this morning it was 186/99. She tried to rest and drink some water and checked her blood pressure again at 12:50 and it was 180/105. Her blood pressure in the office today is 152/90.     Patient's PMR from outside medical facility reviewed and noted.    Past Medical History:   Past Medical History:   Diagnosis Date   • ADHD (attention deficit hyperactivity disorder) 1991   • Allergic 1996   • Anxiety 1994   • Asthma 2010   • Cholelithiasis 2003   • Colon polyp 2010   • Depression 1996   • Fibromyalgia, primary 2012   • GERD (gastroesophageal reflux disease)    • Glaucoma 2010    Elevated Pressure   • Headache 1996   • Hernia     • Hypertension    • Sleep apnea      Past Surgical History:  Past Surgical History:   Procedure Laterality Date   • ADENOIDECTOMY     •  SECTION     • CHOLECYSTECTOMY     • COLONOSCOPY     • ENDOMETRIAL ABLATION     • ENDOSCOPY N/A 2023    Procedure: ESOPHAGOGASTRODUODENOSCOPY WITH ANESTHESIA;  Surgeon: Binh Michele MD;  Location: [facility] ENDOSCOPY;  Service: General;  Laterality: N/A;  pre screen  post esophagitis      • HYSTERECTOMY     • SINUS SURGERY     • SUBTOTAL HYSTERECTOMY     • TONSILLECTOMY     • TUBAL ABDOMINAL LIGATION      Reversed in      Social History:  reports that she has never smoked. She has never used smokeless tobacco. She reports current alcohol use of about 2.0 standard drinks per week. She reports that she does not use drugs.    Family History: family history includes Asthma in her mother; Cancer in her maternal aunt, maternal grandfather, maternal grandmother, and paternal grandmother; Colon cancer in her maternal grandmother; Depression in her father; Diabetes in her paternal grandmother; Early death in her paternal aunt and paternal grandfather; Heart disease in her maternal grandmother; Learning disabilities in her father and son; Lung cancer in her maternal grandmother; Mental illness in her father; Pancreatic cancer in her maternal grandfather; Thyroid disease in her maternal grandfather and mother; Vision loss in her paternal grandmother. She was adopted.      Allergies:  Allergies   Allergen Reactions   • Avocado Anaphylaxis   • Promethazine Itching   • Prilosec [Omeprazole] Rash, Other (See Comments) and GI Intolerance     Achy joints     Medications:  Prior to Admission medications    Medication Sig Start Date End Date Taking? Authorizing Provider   busPIRone (BUSPAR) 7.5 MG tablet Take 1 tablet by mouth 3 (Three) Times a Day As Needed (anxiety). 3/2/23  Yes Wendy Andrade APRN   cyclobenzaprine (FLEXERIL) 10 MG tablet  Take 1 tablet by mouth 3 (Three) Times a Day As Needed for Muscle Spasms.   Yes Aníbal Beltran MD   ibuprofen (ADVIL,MOTRIN) 800 MG tablet Take 1 tablet by mouth Every 6 (Six) Hours As Needed for Mild Pain.   Yes Aníbal Beltran MD   losartan (COZAAR) 100 MG tablet Take 1 tablet by mouth Daily. 3/2/23  Yes Wendy Andrade APRN   sertraline (ZOLOFT) 100 MG tablet Take 50 mg by mouth Daily. 8/12/22  Yes Aníbal Beltran MD   vitamin D (ERGOCALCIFEROL) 1.25 MG (00887 UT) capsule capsule Take 1 capsule by mouth 1 (One) Time Per Week. 3/3/23  Yes Wendy Andrade APRN   acyclovir (ZOVIRAX) 5 % cream Apply 1 application topically to the appropriate area as directed Every 3 (Three) Hours.  Patient not taking: Reported on 3/29/2023    Aníbal Beltran MD   amLODIPine (NORVASC) 5 MG tablet TAKE 1 TABLET BY MOUTH DAILY 4/13/23   Wendy Andrade APRN   ciclopirox (PENLAC) 8 % solution Apply  topically to the appropriate area as directed Every Night.  Patient not taking: Reported on 3/29/2023 3/2/23   Wendy Andrade APRN   clotrimazole-betamethasone (LOTRISONE) 1-0.05 % cream Apply 1 application topically to the appropriate area as directed 2 (Two) Times a Day.  Patient not taking: Reported on 4/13/2023    Aníbal Beltran MD   cyanocobalamin 1000 MCG/ML injection Inject 1 mL into the appropriate muscle as directed by prescriber 1 (One) Time Per Week.  Patient not taking: Reported on 4/13/2023 3/3/23   Wendy Andrade APRN   methylphenidate 36 MG CR tablet Take 1 tablet by mouth Daily  Patient not taking: Reported on 3/29/2023 11/3/22   Aníbal Beltran MD   montelukast (SINGULAIR) 10 MG tablet Take 1 tablet by mouth Every Night.  Patient not taking: Reported on 4/13/2023    Aníbal Beltran MD   oxyCODONE-acetaminophen (Percocet) 5-325 MG per tablet Take 1 tablet by mouth Every 6 (Six) Hours As Needed for Severe Pain. 4/13/23   Wendy Andrade APRN   traZODone  "(DESYREL) 50 MG tablet Take 1 tablet by mouth Every Night.  Patient not taking: Reported on 3/29/2023    Provider, MD Aníbal   amLODIPine (NORVASC) 5 MG tablet Take 1 tablet by mouth Daily. 4/13/23 4/13/23  Wendy Andrade APRN   methylPREDNISolone (MEDROL) 4 MG dose pack Take as directed on package instructions. 3/16/23 4/13/23  Wendy Andrade APRN       Objective     Vital Signs: /90 (BP Location: Left arm, Patient Position: Sitting, Cuff Size: Large Adult)   Pulse (!) 121   Temp 97.1 °F (36.2 °C) (Temporal)   Ht 167.6 cm (66\")   Wt (!) 154 kg (339 lb)   SpO2 97%   BMI 54.72 kg/m²   Physical Exam  Vitals and nursing note reviewed.   Constitutional:       General: She is not in acute distress.     Appearance: She is obese. She is not ill-appearing or toxic-appearing.   HENT:      Head: Normocephalic and atraumatic.      Comments: Facial flushing     Mouth/Throat:      Mouth: Mucous membranes are moist.      Pharynx: Oropharynx is clear.   Cardiovascular:      Rate and Rhythm: Normal rate and regular rhythm.      Pulses: Normal pulses.      Heart sounds: Normal heart sounds.   Pulmonary:      Effort: Pulmonary effort is normal.      Breath sounds: No wheezing, rhonchi or rales.   Abdominal:      General: Bowel sounds are normal. There is no distension.      Palpations: Abdomen is soft.      Tenderness: There is no abdominal tenderness.   Musculoskeletal:         General: Swelling (right foot, lateral right knee) and tenderness (Posterior right knee) present. Normal range of motion.      Cervical back: Normal range of motion and neck supple. No tenderness.   Skin:     General: Skin is warm and dry.      Findings: No erythema or rash.   Neurological:      General: No focal deficit present.      Mental Status: She is alert and oriented to person, place, and time.   Psychiatric:         Mood and Affect: Mood normal.         Behavior: Behavior normal.         Thought Content: Thought content " normal.         Judgment: Judgment normal.       Assessment / Plan     Assessment/Plan:  Diagnoses and all orders for this visit:    1. Right leg swelling (Primary)  -     US Venous Doppler Lower Extremity Right (duplex); Future    2. Posterior right knee pain  -     oxyCODONE-acetaminophen (Percocet) 5-325 MG per tablet; Take 1 tablet by mouth Every 6 (Six) Hours As Needed for Severe Pain.  Dispense: 12 tablet; Refill: 0    3. Primary hypertension    Other orders  -     Discontinue: amLODIPine (NORVASC) 5 MG tablet; Take 1 tablet by mouth Daily.  Dispense: 30 tablet; Refill: 1       Patient presents to the office today with complaints of elevated blood pressure and right knee pain. She had an injury about 3 weeks ago where she slipped and twisted her right knee. She has been seeing the [facility] and states she was told she had a torn meniscus and either a displaced or broken kneecap.  She has been going to physical therapy and using a crutch.  She is having pain that is unrelieved with ibuprofen 800 mg 3 times a day.  We will provide a temporary prescription for Percocet for her to use as needed for pain.  I do feel that her acute pain is likely contributory to her elevated blood pressures.  The patient does report a history of DVT on the right leg.  As she is having posterior knee pain and swelling, will perform a venous Doppler ultrasound to rule out DVT.  Pain is likely secondary to her injury, but given her history and immobility feel we need to rule this out.    The patient will continue losartan 100 mg daily.  We will add amlodipine 5 mg daily.  She will continue to monitor her blood pressure at home.  She has been instructed to call for elevated readings consistently above 140/90.  Short-term follow-up in 2 weeks.  She will bring her blood pressure cuff with her to her next appointment.    Return in about 2 weeks (around 4/27/2023) for Recheck. unless patient needs to be seen sooner or acute  issues arise.    I have discussed the patient results/orders and and plan/recommendation with them at today's visit.      Wendy Andrade, APRN   04/13/2023

## 2023-04-18 ENCOUNTER — OFFICE VISIT (OUTPATIENT)
Dept: INTERNAL MEDICINE | Facility: CLINIC | Age: 43
End: 2023-04-18
Payer: COMMERCIAL

## 2023-04-18 VITALS
HEIGHT: 66 IN | DIASTOLIC BLOOD PRESSURE: 88 MMHG | TEMPERATURE: 97.5 F | SYSTOLIC BLOOD PRESSURE: 158 MMHG | OXYGEN SATURATION: 96 % | HEART RATE: 115 BPM | BODY MASS INDEX: 47.09 KG/M2 | WEIGHT: 293 LBS

## 2023-04-18 DIAGNOSIS — F32.A ANXIETY AND DEPRESSION: ICD-10-CM

## 2023-04-18 DIAGNOSIS — Z56.6 STRESS AT WORK: ICD-10-CM

## 2023-04-18 DIAGNOSIS — I10 POORLY-CONTROLLED HYPERTENSION: Primary | ICD-10-CM

## 2023-04-18 DIAGNOSIS — F41.9 ANXIETY AND DEPRESSION: ICD-10-CM

## 2023-04-18 PROCEDURE — 99214 OFFICE O/P EST MOD 30 MIN: CPT | Performed by: NURSE PRACTITIONER

## 2023-04-18 RX ORDER — CARVEDILOL 12.5 MG/1
12.5 TABLET ORAL 2 TIMES DAILY WITH MEALS
Qty: 60 TABLET | Refills: 2 | Status: SHIPPED | OUTPATIENT
Start: 2023-04-18

## 2023-04-18 SDOH — HEALTH STABILITY - MENTAL HEALTH: OTHER PHYSICAL AND MENTAL STRAIN RELATED TO WORK: Z56.6

## 2023-04-18 NOTE — PROGRESS NOTES
Subjective     Chief Complaint:  Hypertension    HPI:  Patient presents to the office today for an ER follow-up.  She was last seen in the office on 4/13/2023 with complaints of elevated blood pressure and right knee pain.  She had been on a work trip 3 weeks prior in [place] and had slipped and twisted her right knee.  She has been seen at the [facility] and indicates that she was diagnosed with a torn meniscus and states she may have either broken or dislocated her kneecap.  She has been performing physical therapy.      For several days preceding her last office that the patient had been feeling unwell.  She had indicated having episodes of shakiness and dizziness as well as occipital headaches.  The patient did check her blood pressure at home on 4/13 and it had gotten as high as 180/105.  At the time she was evaluated in the office her blood pressure was 152/90.  At that time, amlodipine was added to her medication regimen.  The patient states on the following day her blood pressure upon awakening was 141/78.  By 4 PM she was feeling very unwell with headache, dizziness, nausea and confusion.  Blood pressure at that time was 184/144.  She did take her amlodipine and took a nap as well.  Upon waking her blood pressure was 162/70.  1 hour later, her blood pressure was 204/92 and the patient's symptoms of headache, dizziness, nausea and confusion returned.  She went to the emergency department in [place], [place] and states she was given 2 medications by mouth for her blood pressure and was also given a migraine cocktail.  She indicates no imaging was performed.  For the following 2 days her blood pressure remained well controlled.  Her blood pressure in the office today is 158/88. She does feel that her job is causing her a significant amount of stress as she has been performing multiple tasks that are not necessarily within her job description.  She does have a history of both anxiety  and depression.  She does take Zoloft and was recently prescribed buspirone by myself.  She does indicate that the buspirone is helping.  She does feel as though she would like to speak with a counselor to discuss healthy coping mechanisms with stress.    The patient states that she has continued to have a very dull headache.  She has had no further episodes of nausea, vomiting or confusion.  She indicates that she did previously see a cardiologist in [place].    Patient's PMR from outside medical facility reviewed and noted.    Past Medical History:   Past Medical History:   Diagnosis Date   • ADHD (attention deficit hyperactivity disorder)    • Allergic    • Anxiety    • Asthma    • Cholelithiasis    • Colon polyp    • Depression    • Fibromyalgia, primary    • GERD (gastroesophageal reflux disease)    • Glaucoma     Elevated Pressure   • Headache    • Hernia    • Hypertension    • Sleep apnea      Past Surgical History:  Past Surgical History:   Procedure Laterality Date   • ADENOIDECTOMY     •  SECTION     • CHOLECYSTECTOMY     • COLONOSCOPY     • ENDOMETRIAL ABLATION     • ENDOSCOPY N/A 2023    Procedure: ESOPHAGOGASTRODUODENOSCOPY WITH ANESTHESIA;  Surgeon: Binh Michele MD;  Location: [facility] ENDOSCOPY;  Service: General;  Laterality: N/A;  pre screen  post esophagitis      • HYSTERECTOMY     • SINUS SURGERY     • SUBTOTAL HYSTERECTOMY     • TONSILLECTOMY     • TUBAL ABDOMINAL LIGATION      Reversed in      Social History:  reports that she has never smoked. She has never used smokeless tobacco. She reports that she does not currently use alcohol after a past usage of about 2.0 standard drinks per week. She reports that she does not use drugs.    Family History: family history includes Asthma in her mother; Cancer in her maternal aunt, maternal grandfather, maternal grandmother, and paternal grandmother; Colon cancer  in her maternal grandmother; Depression in her father; Diabetes in her paternal grandmother; Early death in her paternal aunt and paternal grandfather; Heart disease in her maternal grandmother; Learning disabilities in her father and son; Lung cancer in her maternal grandmother; Mental illness in her father; Pancreatic cancer in her maternal grandfather; Thyroid disease in her maternal grandfather and mother; Vision loss in her paternal grandmother. She was adopted.      Allergies:  Allergies   Allergen Reactions   • Avocado Anaphylaxis   • Promethazine Itching   • Prilosec [Omeprazole] Rash, Other (See Comments) and GI Intolerance     Achy joints     Medications:  Prior to Admission medications    Medication Sig Start Date End Date Taking? Authorizing Provider   acyclovir (ZOVIRAX) 5 % cream Apply 1 application topically to the appropriate area as directed Every 3 (Three) Hours.   Yes Aníbal Beltran MD   busPIRone (BUSPAR) 7.5 MG tablet Take 1 tablet by mouth 3 (Three) Times a Day As Needed (anxiety). 3/2/23  Yes Wendy Andrade APRN   cyanocobalamin 1000 MCG/ML injection Inject 1 mL into the appropriate muscle as directed by prescriber 1 (One) Time Per Week. 3/3/23  Yes Wendy Andrade APRN   ibuprofen (ADVIL,MOTRIN) 800 MG tablet Take 1 tablet by mouth Every 6 (Six) Hours As Needed for Mild Pain.   Yes Aníbal Beltran MD   losartan (COZAAR) 100 MG tablet Take 1 tablet by mouth Daily. 3/2/23  Yes Wendy Andrade APRN   montelukast (SINGULAIR) 10 MG tablet Take 1 tablet by mouth Every Night.   Yes Aníbal Beltran MD   oxyCODONE-acetaminophen (Percocet) 5-325 MG per tablet Take 1 tablet by mouth Every 6 (Six) Hours As Needed for Severe Pain. 4/13/23  Yes Wendy Andrade APRN   sertraline (ZOLOFT) 100 MG tablet Take 50 mg by mouth Daily. 8/12/22  Yes Aníbal Beltran MD   vitamin D (ERGOCALCIFEROL) 1.25 MG (22439 UT) capsule capsule Take 1 capsule by mouth 1 (One) Time Per  "Week. 3/3/23  Yes Wendy Andrade APRN   amLODIPine (NORVASC) 5 MG tablet TAKE 1 TABLET BY MOUTH DAILY 4/13/23 4/18/23 Yes Wendy Andrade APRN   carvedilol (Coreg) 12.5 MG tablet Take 1 tablet by mouth 2 (Two) Times a Day With Meals. 4/18/23   Wendy Andrade APRN   ciclopirox (PENLAC) 8 % solution Apply  topically to the appropriate area as directed Every Night.  Patient not taking: Reported on 3/29/2023 3/2/23   Wendy Andrade APRN   clotrimazole-betamethasone (LOTRISONE) 1-0.05 % cream Apply 1 application topically to the appropriate area as directed 2 (Two) Times a Day.  Patient not taking: Reported on 4/13/2023    Aníbal Beltran MD   cyclobenzaprine (FLEXERIL) 10 MG tablet Take 1 tablet by mouth 3 (Three) Times a Day As Needed for Muscle Spasms.  Patient not taking: Reported on 4/18/2023    Aníbal Beltran MD   methylphenidate 36 MG CR tablet Take 1 tablet by mouth Daily  Patient not taking: Reported on 3/29/2023 11/3/22   Aníbal Beltran MD   traZODone (DESYREL) 50 MG tablet Take 1 tablet by mouth Every Night.  Patient not taking: Reported on 3/29/2023    Aníbal Beltran MD       Objective     Vital Signs: /88 (BP Location: Left arm, Patient Position: Sitting, Cuff Size: Adult)   Pulse 115   Temp 97.5 °F (36.4 °C) (Temporal)   Ht 167.6 cm (66\")   Wt (!) 144 kg (318 lb)   SpO2 96%   BMI 51.33 kg/m²   Physical Exam  Vitals and nursing note reviewed.   Constitutional:       General: She is not in acute distress.     Appearance: She is obese. She is not ill-appearing or toxic-appearing.   HENT:      Head: Normocephalic and atraumatic.      Mouth/Throat:      Mouth: Mucous membranes are moist.      Pharynx: Oropharynx is clear.   Cardiovascular:      Rate and Rhythm: Regular rhythm. Tachycardia present.      Pulses: Normal pulses.      Heart sounds: Normal heart sounds.   Pulmonary:      Effort: Pulmonary effort is normal.      Breath sounds: No wheezing, " rhonchi or rales.   Abdominal:      General: Bowel sounds are normal. There is no distension.      Palpations: Abdomen is soft.      Tenderness: There is no abdominal tenderness.   Musculoskeletal:         General: No swelling or tenderness. Normal range of motion.      Cervical back: Normal range of motion and neck supple. No tenderness.   Skin:     General: Skin is warm and dry.      Findings: No erythema or rash.      Comments: Facial flushing   Neurological:      General: No focal deficit present.      Mental Status: She is alert and oriented to person, place, and time.   Psychiatric:         Mood and Affect: Mood normal.         Behavior: Behavior normal.         Thought Content: Thought content normal.         Judgment: Judgment normal.       Assessment / Plan     Assessment/Plan:  Diagnoses and all orders for this visit:    1. Poorly-controlled hypertension (Primary)  -     carvedilol (Coreg) 12.5 MG tablet; Take 1 tablet by mouth 2 (Two) Times a Day With Meals.  Dispense: 60 tablet; Refill: 2    2. Stress at work  -     Ambulatory Referral to Psychology    3. Anxiety and depression  -     Ambulatory Referral to Psychology       Patient presents to the office today for an ER follow-up.  She has last been seen on 4/13/2023 with poorly controlled hypertension.  She was to continue losartan and amlodipine 5 mg daily was added to her medication regimen.  By the following day her blood pressure had been elevated as high as 204/92.  At that point she was having headache, dizziness, vomiting and confusion and her  took her to the emergency department in [place], [place].  She states that she was given oral medications for her blood pressure and a migraine cocktail in the emergency department which did help immensely.  Her blood pressure remained well controlled for the next 48 hours or so.  Her blood pressure in the office today is 158/88.  She is tachycardic as well.  Note that she is not taking  methylphenidate any longer and has not done so for several weeks.  At this time, I would like to stop amlodipine and place her on carvedilol as her heart rate is elevated as well.  I have asked the patient to monitor her blood pressure at home for the next week and we will follow-up in 1 week with these readings.  She is to call sooner for any problems or concerns.    I believe she was likely experiencing hypertensive encephalopathy as her confusion resolved following improvement of her blood pressure.  I have asked her to return to the emergency department for recurring symptoms.  The patient previously has seen a cardiologist in [place].  I have requested these records along with the records from her recent ER visit.  The patient does tell me that she believes that she previously took atenolol and some form of diuretic for her blood pressure.  The diuretic depleted her electrolytes so profoundly she had to be hospitalized for electrolyte replacement so I will avoid diuretic therapy for now.    Referral made to psychology for counseling purposes as the patient is interested in discussing techniques for stress reduction.  She tells me she is also thinking about taking some time off of work, and as this is felt to be her biggest source of stress I feel this is appropriate and supported her decision.    Return in about 1 week (around 4/25/2023) for Recheck- HTN. unless patient needs to be seen sooner or acute issues arise.    I have discussed the patient results/orders and and plan/recommendation with them at today's visit.      Wendy Andrade, APRN   04/18/2023

## 2023-04-24 ENCOUNTER — TELEPHONE (OUTPATIENT)
Dept: INTERNAL MEDICINE | Facility: CLINIC | Age: 43
End: 2023-04-24

## 2023-04-24 NOTE — TELEPHONE ENCOUNTER
Caller: [facility]    Relationship: Other    Best call back number: [phone]  REFERENCE NUMBER [id number]    What is the best time to reach you: ANY    Who are you requesting to speak with (clinical staff, provider,  specific staff member): CLINICAL    What was the call regarding:     [facility] WOULD LIKE TO CONFIRM SOME OF PATIENT'S MEDICAL INFORMATION AND APPOINTMENT DETAILS.    Do you require a callback: YES

## 2023-04-25 ENCOUNTER — OFFICE VISIT (OUTPATIENT)
Dept: INTERNAL MEDICINE | Facility: CLINIC | Age: 43
End: 2023-04-25
Payer: COMMERCIAL

## 2023-04-25 ENCOUNTER — OFFICE VISIT (OUTPATIENT)
Dept: BARIATRICS/WEIGHT MGMT | Facility: CLINIC | Age: 43
End: 2023-04-25
Payer: COMMERCIAL

## 2023-04-25 VITALS
HEART RATE: 89 BPM | HEIGHT: 66 IN | TEMPERATURE: 96.9 F | DIASTOLIC BLOOD PRESSURE: 85 MMHG | OXYGEN SATURATION: 94 % | SYSTOLIC BLOOD PRESSURE: 128 MMHG | WEIGHT: 293 LBS | BODY MASS INDEX: 47.09 KG/M2

## 2023-04-25 VITALS
BODY MASS INDEX: 47.09 KG/M2 | SYSTOLIC BLOOD PRESSURE: 128 MMHG | TEMPERATURE: 97.1 F | DIASTOLIC BLOOD PRESSURE: 82 MMHG | HEART RATE: 110 BPM | HEIGHT: 66 IN | WEIGHT: 293 LBS | OXYGEN SATURATION: 97 %

## 2023-04-25 DIAGNOSIS — E66.01 CLASS 3 SEVERE OBESITY DUE TO EXCESS CALORIES WITH SERIOUS COMORBIDITY AND BODY MASS INDEX (BMI) OF 50.0 TO 59.9 IN ADULT: Primary | ICD-10-CM

## 2023-04-25 DIAGNOSIS — I10 PRIMARY HYPERTENSION: Primary | ICD-10-CM

## 2023-04-25 DIAGNOSIS — F41.9 ANXIETY AND DEPRESSION: ICD-10-CM

## 2023-04-25 DIAGNOSIS — E66.01 CLASS 3 SEVERE OBESITY DUE TO EXCESS CALORIES WITH SERIOUS COMORBIDITY AND BODY MASS INDEX (BMI) OF 50.0 TO 59.9 IN ADULT: ICD-10-CM

## 2023-04-25 DIAGNOSIS — F32.A ANXIETY AND DEPRESSION: ICD-10-CM

## 2023-04-25 DIAGNOSIS — G47.30 SLEEP APNEA, UNSPECIFIED TYPE: ICD-10-CM

## 2023-04-25 DIAGNOSIS — K21.9 GASTROESOPHAGEAL REFLUX DISEASE, UNSPECIFIED WHETHER ESOPHAGITIS PRESENT: ICD-10-CM

## 2023-04-25 PROCEDURE — 99213 OFFICE O/P EST LOW 20 MIN: CPT | Performed by: NURSE PRACTITIONER

## 2023-04-25 PROCEDURE — 99214 OFFICE O/P EST MOD 30 MIN: CPT | Performed by: SURGERY

## 2023-04-25 RX ORDER — GABAPENTIN 250 MG/5ML
250 SOLUTION ORAL ONCE
Status: CANCELLED | OUTPATIENT
Start: 2023-04-25 | End: 2023-04-25

## 2023-04-25 RX ORDER — ONDANSETRON 2 MG/ML
4 INJECTION INTRAMUSCULAR; INTRAVENOUS EVERY 6 HOURS PRN
Status: CANCELLED | OUTPATIENT
Start: 2023-04-25

## 2023-04-25 RX ORDER — SCOLOPAMINE TRANSDERMAL SYSTEM 1 MG/1
1 PATCH, EXTENDED RELEASE TRANSDERMAL CONTINUOUS
Status: CANCELLED | OUTPATIENT
Start: 2023-04-25 | End: 2023-04-28

## 2023-04-25 RX ORDER — ENOXAPARIN SODIUM 100 MG/ML
40 INJECTION SUBCUTANEOUS ONCE
Status: CANCELLED | OUTPATIENT
Start: 2023-04-25 | End: 2023-04-25

## 2023-04-25 NOTE — PROGRESS NOTES
Patient Care Team:  Wendy Andrade APRN as PCP - General (Nurse Practitioner)    Reason for Visit:  Surgical Weight loss    Subjective      Allison Mckeon is a pleasant 42 y.o. female and presents with morbid obesity with her Body mass index is 54.81 kg/m².    She is here for discussion of weight loss options.  She stated she has been with the disease of obesity for year(s).  She stated she suffers from GERD, obstructive sleep apnea and morbid obesity due to her weight gain.  She stated that weight loss helps alleviate these symptoms.   She stated that she has tried multiple diet regimens including completing a medically supervised weight loss program to help with weight loss.  She stated that she has attempted these conservative methods for weight loss without maintaining long term success.  Today she would like to discuss surgical weight loss options such as the Laparoscopic Sleeve Gastrectomy or the Laparoscopic R - Y Gastric Bypass.      Review of Systems  General ROS: positive for  - fatigue and sleep disturbance  Psychological ROS: negative  Respiratory ROS: no cough, shortness of breath, or wheezing  Cardiovascular ROS: no chest pain or dyspnea on exertion  Gastrointestinal ROS: no abdominal pain, change in bowel habits, or black or bloody stools    History  Past Medical History:   Diagnosis Date   • ADHD (attention deficit hyperactivity disorder)    • Allergic    • Allergic rhinitis    • Anxiety    • Asthma    • Cholelithiasis    • Colon polyp    • Depression    • Disease of thyroid gland     Hashimotos   • Fibromyalgia, primary    • GERD (gastroesophageal reflux disease)    • Glaucoma     Elevated Pressure   • Headache    • Hernia    • Hypertension    • Sleep apnea      Past Surgical History:   Procedure Laterality Date   • ADENOIDECTOMY     •  SECTION     • CHOLECYSTECTOMY     • COLONOSCOPY     • ENDOMETRIAL ABLATION     •  ENDOSCOPY N/A 02/03/2023    Procedure: ESOPHAGOGASTRODUODENOSCOPY WITH ANESTHESIA;  Surgeon: Binh Michele MD;  Location: [facility] ENDOSCOPY;  Service: General;  Laterality: N/A;  pre screen  post esophagitis      • HYSTERECTOMY     • SINUS SURGERY  2018   • SUBTOTAL HYSTERECTOMY  2019   • TONSILLECTOMY     • TUBAL ABDOMINAL LIGATION  1999    Reversed in 2010     Family History   Adopted: Yes   Problem Relation Age of Onset   • Asthma Mother    • Thyroid disease Mother         Hyper   • Depression Father    • Learning disabilities Father         Dyslexia   • Mental illness Father         Clinical Psycopath   • Heart disease Maternal Grandmother    • Lung cancer Maternal Grandmother    • Colon cancer Maternal Grandmother    • Cancer Maternal Grandmother         Colon Cancer (Twice)   • Pancreatic cancer Maternal Grandfather    • Cancer Maternal Grandfather         Pancreas Cancer   • Thyroid disease Maternal Grandfather         Hypo   • Cancer Paternal Grandmother         Colon & Lung Cancer   • Diabetes Paternal Grandmother    • Vision loss Paternal Grandmother    • Early death Paternal Grandfather         Aneurysm < 40 years   • Learning disabilities Son    • Cancer Maternal Aunt         Breast Cancer < 40 years   • Early death Paternal Aunt         Breast Cancer < 40 years     Social History     Tobacco Use   • Smoking status: Never   • Smokeless tobacco: Never   Vaping Use   • Vaping Use: Never used   Substance Use Topics   • Alcohol use: Not Currently     Alcohol/week: 2.0 standard drinks     Types: 2 Glasses of wine per week     Comment: social   • Drug use: Never     E-cigarette/Vaping   • E-cigarette/Vaping Use Never User      E-cigarette/Vaping Substances     (Not in a hospital admission)    Allergies:  Avocado, Promethazine, and Prilosec [omeprazole]      Current Outpatient Medications:   •  acyclovir (ZOVIRAX) 5 % cream, Apply 1 application topically to the appropriate area as directed Every 3 (Three)  Hours., Disp: , Rfl:   •  busPIRone (BUSPAR) 7.5 MG tablet, Take 1 tablet by mouth 3 (Three) Times a Day As Needed (anxiety)., Disp: 90 tablet, Rfl: 0  •  carvedilol (Coreg) 12.5 MG tablet, Take 1 tablet by mouth 2 (Two) Times a Day With Meals., Disp: 60 tablet, Rfl: 2  •  ciclopirox (PENLAC) 8 % solution, Apply  topically to the appropriate area as directed Every Night., Disp: 6 mL, Rfl: 5  •  clotrimazole-betamethasone (LOTRISONE) 1-0.05 % cream, Apply 1 application topically to the appropriate area as directed 2 (Two) Times a Day., Disp: , Rfl:   •  cyanocobalamin 1000 MCG/ML injection, Inject 1 mL into the appropriate muscle as directed by prescriber 1 (One) Time Per Week., Disp: 10 mL, Rfl: 1  •  cyclobenzaprine (FLEXERIL) 10 MG tablet, Take 1 tablet by mouth 3 (Three) Times a Day As Needed for Muscle Spasms., Disp: , Rfl:   •  ibuprofen (ADVIL,MOTRIN) 800 MG tablet, Take 1 tablet by mouth Every 6 (Six) Hours As Needed for Mild Pain., Disp: , Rfl:   •  losartan (COZAAR) 100 MG tablet, Take 1 tablet by mouth Daily., Disp: 30 tablet, Rfl: 3  •  methylphenidate 36 MG CR tablet, Take 1 tablet by mouth Daily, Disp: , Rfl:   •  montelukast (SINGULAIR) 10 MG tablet, Take 1 tablet by mouth Every Night., Disp: , Rfl:   •  oxyCODONE-acetaminophen (Percocet) 5-325 MG per tablet, Take 1 tablet by mouth Every 6 (Six) Hours As Needed for Severe Pain., Disp: 12 tablet, Rfl: 0  •  sertraline (ZOLOFT) 100 MG tablet, Take 50 mg by mouth Daily., Disp: , Rfl:   •  traZODone (DESYREL) 50 MG tablet, Take 1 tablet by mouth Every Night., Disp: , Rfl:   •  vitamin D (ERGOCALCIFEROL) 1.25 MG (58133 UT) capsule capsule, Take 1 capsule by mouth 1 (One) Time Per Week., Disp: 12 capsule, Rfl: 3    Objective     Vital Signs  Temp:  [96.9 °F (36.1 °C)] 96.9 °F (36.1 °C)  Heart Rate:  [89] 89  BP: (128)/(85) 128/85  Body mass index is 54.81 kg/m².      04/25/23  0905   Weight: (!) 154 kg (339 lb 9.6 oz)       General Appearance:  awake, alert,  oriented, in no acute distress  Lungs:  Normal expansion.  Clear to auscultation.  No rales, rhonchi, or wheezing.  Heart:  Heart regular rate and rhythm  Abdomen:  Soft, non-tender, normal bowel sounds; no bruits, organomegaly or masses.  Abnormal shape: obese    Results Review:   I reviewed the patient's new clinical results.        Assessment & Plan   Encounter Diagnoses   Name Primary?   • Class 3 severe obesity due to excess calories with serious comorbidity and body mass index (BMI) of 50.0 to 59.9 in adult Yes   • Gastroesophageal reflux disease, unspecified whether esophagitis present    • Sleep apnea, unspecified type        AKD Plan List: I believe this patient will be a good candidate for weight loss surgery.    She has chosen laparoscopic sleeve gastrectomy. I agree with this decision.  I have discussed the Kay - Y Gastric Bypass, laparoscopic sleeve gastrectomy and the Laparoscopic Gastric Band procedures to provide the alternatives which includes non surgical weight loss options as well.  We discussed the benefits of the surgeries including the benefit of weight loss and the possible reversal of co-morbid conditions associated with morbid obesity.  She is aware that the Sleeve procedure is not reversible.  We discussed the complications and risks which include the risk of perforation, leakage,bleeding, intra-abdominal organ injury, specifically at high risks of the liver and spleen, stenosis or ulcerations, the risk of venous thrombosis formation in the lungs, mesentery veins or lower extremities  leading to possible organ injury and death.  I explained to the patient that there is a risk of infection.  I explained to her the possibility that this procedure may not be performed laparoscopically and may require being converted to an opened procedure or aborted due to abnormal anatomy.  Also postoperatively there is a risk of increased GERD symptoms after this procedure.  I have explained if she  develops intestinal metaplasia of her esophagus consideration for conversion to another weight loss procedure may be necessary.    I have explained to the patient that depression, addictive behaviors and even an increase in suicidal emotions can occur after surgery and even though they have undergone a mental health evaluation through psychology/psychiatry or by a registered therapist she may require additional evaluation and treatment in the future.  Nicotine cessation needs to continue even after their surgical procedure and nicotine products should be avoided due to the side effects of these products.  I explained to Allison Mckeon not to plan for pregnancy within 12 to 18 months of her procedure.  I explained to her postoperatively she should avoid having unprotected sex that would increase her risk of pregnancy during the postoperative period of 1 to 1-1/2 years.  I have also explained to the patient that if there is a hiatal hernia defect this will be repaired during her procedure which will be determined intraoperatively.    I have reviewed with the patient her 30-day mortality risk using the ACS Surgical Risk Calculator to be less than 0.1%.    I explained to the patient that the success of the surgery is directly related to the motivation and dedication to behavioral changes that they have learned during their preoperative course.  There is data that shows approximately 10% of patients may have satisfactory weight loss or regain their weight they have lost after surgery.  It is more likely due to returning to the previous behaviors they incorporated during their disease of obesity.  I also encourage Allison Mckeon to incorporate exercise again after surgery weight restrictions have been removed postoperatively.    Serafin Report   As part of this patient's treatment plan I am prescribing controlled substances.  We review Serafin in our educational course.  The patient has been made aware of appropriate use of  such medications, including potential risk of somnolence, limited ability to drive and /or work safely, and potential for dependence or overdose. It has also been made clear that these medications are for use by this patient only, without concomitant use of alcohol or other substances unless prescribed.    Allison Mckeon will be required to complete the educational preoperative class detailing terms of the preoperative and postoperative recommendations, risks of surgery, the monitoring of the patient's LANRE reports if necessary. Allison Mckeon is aware that inappropriate use of prescribed medications will result in cessation of prescribing such medications.    LANRE report has been reviewed.      History and physical exam exhibit continued safe and appropriate use of controlled substances.       Allison Mckeon understands the surgical procedures and the different surgical options that are available.   Allison Mckeon understands the lifestyle changes that are required after surgery and has agreed to follow the guidelines outlined in the weight management program. Allison Mckeon also expressed understanding of the risks involved and had all of her questions answered and desires to proceed.    Upon completion of our discussion and addressing and answering her questions to her satisfaction, informed consent was obtained.   She will be scheduled accordingly for a laparoscopic Sleeve gastrectomy procedure.        I discussed the patient's findings and my recommendations with patient.  I have also recommended that she obtain her preoperative educational course, laboratory work and preoperative diet prior to surgery consideration.    Dr. Binh Michele MD FACS    04/25/23  09:19 CDT  Patient Care Team:  Wendy Andrade APRN as PCP - General (Nurse Practitioner)

## 2023-04-25 NOTE — PROGRESS NOTES
Subjective     Chief Complaint:  Hypertension    HPI:  Patient presents to the office today for a 1 week follow-up. She had been seen in the office on 4/13/2023  with complaints of elevated blood pressure and right knee pain.  She had been on a work trip 3 weeks prior in [place] and had slipped and twisted her right knee.  She has been seen at the orthopedic [facility] and indicates that she was diagnosed with a torn meniscus and states she may have either broken or dislocated her kneecap.  She has been performing physical therapy.  For several days preceding her last office that the patient had been feeling unwell.  She had indicated having episodes of shakiness and dizziness as well as occipital headaches. Her blood pressure was markedly elevated at home with systolic blood pressure in the 180's and diastolic blood pressure greater than 100. Amlodipine was added to the patient's medication regimen. Unfortunately, this was not controlling the patient's blood pressure well and she ended up going to an emergency department in [facility] the following day as she was having headache, dizziness, nausea and confusion. She was given a migraine cocktail and 2 medications orally for her blood pressure.     When I saw her in follow-up in the office last week her blood pressure was 158/88 and she had been receiving notifications from her watch that her resting heart rate was often elevated over 100. At that point, Amlodipine was discontinued in favor of Coreg. Patient's morning blood pressure upon waking are still elevated in the 160's-180's but after taking her medication, this does go down to the 120's-140's. She tells me she did sleep more for the first couple of days after starting the Coreg but feels as though that is improving.  Her heart rate has been better controlled which she states has been making her anxiety better. She denies chest pain, shortness of breath. She has had no further headaches,  confusion, or similar symptoms that led to her ER visit. She believes that her job is contributing to her stress and has applied for FMLA, which I am in full support of. She has started the intake process for counseling.     She is scheduled for gastric sleeve surgery with Dr. Michele on May 11th.     Patient's PMR from outside medical facility reviewed and noted.    Past Medical History:   Past Medical History:   Diagnosis Date   • ADHD (attention deficit hyperactivity disorder)    • Allergic     Avocado; Anaphylaxis   • Allergic rhinitis    • Anxiety    • Asthma    • Cholelithiasis    • Colon polyp    • Depression    • Disease of thyroid gland     Hashimotos   • Fibromyalgia, primary    • GERD (gastroesophageal reflux disease)    • Glaucoma     Elevated Pressure   • Headache    • Hernia    • Hypertension    • Obesity    • Personal history of COVID-19 2023   • PONV (postoperative nausea and vomiting)    • Sleep apnea     Does Not use CPAP     Past Surgical History:  Past Surgical History:   Procedure Laterality Date   • ADENOIDECTOMY     •  SECTION     • CHOLECYSTECTOMY     • COLONOSCOPY     • ENDOMETRIAL ABLATION     • ENDOSCOPY N/A 2023    Procedure: ESOPHAGOGASTRODUODENOSCOPY WITH ANESTHESIA;  Surgeon: Binh Michele MD;  Location: [facility] ENDOSCOPY;  Service: General;  Laterality: N/A;  pre screen  post esophagitis      • HYSTERECTOMY     • SINUS SURGERY     • SUBTOTAL HYSTERECTOMY     • TONSILLECTOMY     • TUBAL ABDOMINAL LIGATION      Reversed in      Social History:  reports that she has never smoked. She has never used smokeless tobacco. She reports that she does not currently use alcohol after a past usage of about 2.0 standard drinks per week. She reports that she does not use drugs.    Family History: family history includes Asthma in her mother; Cancer in her maternal aunt, maternal grandfather, maternal  grandmother, and paternal grandmother; Colon cancer in her maternal grandmother; Depression in her father; Diabetes in her paternal grandmother; Early death in her paternal aunt and paternal grandfather; Heart disease in her maternal grandmother; Learning disabilities in her father and son; Lung cancer in her maternal grandmother; Mental illness in her father; Pancreatic cancer in her maternal grandfather; Thyroid disease in her maternal grandfather and mother; Vision loss in her paternal grandmother. She was adopted.      Allergies:  Allergies   Allergen Reactions   • Avocado Anaphylaxis   • Prilosec [Omeprazole] Rash, Other (See Comments) and GI Intolerance     Achy joints   • Promethazine Itching      - PHENERGAN -      Medications:  Prior to Admission medications    Medication Sig Start Date End Date Taking? Authorizing Provider   acyclovir (ZOVIRAX) 5 % cream Apply 1 application topically to the appropriate area as directed Every 3 (Three) Hours.   Yes Aníbal Beltran MD   busPIRone (BUSPAR) 7.5 MG tablet Take 1 tablet by mouth 3 (Three) Times a Day As Needed (anxiety). 3/2/23  Yes Wendy Andrade APRN   carvedilol (Coreg) 12.5 MG tablet Take 1 tablet by mouth 2 (Two) Times a Day With Meals. 4/18/23  Yes Wendy Andrade APRN   ciclopirox (PENLAC) 8 % solution Apply  topically to the appropriate area as directed Every Night. 3/2/23  Yes Wendy Andrade APRN   clotrimazole-betamethasone (LOTRISONE) 1-0.05 % cream Apply 1 application topically to the appropriate area as directed 2 (Two) Times a Day.   Yes ProviderAníbal MD   cyanocobalamin 1000 MCG/ML injection Inject 1 mL into the appropriate muscle as directed by prescriber 1 (One) Time Per Week.  Patient taking differently: Inject 1 mL into the appropriate muscle as directed by prescriber 1 (One) Time Per Week. VITAMIN B12 3/3/23  Yes Wendy Andrade APRN   cyclobenzaprine (FLEXERIL) 10 MG tablet Take 1 tablet by mouth 3 (Three)  "Times a Day As Needed for Muscle Spasms.   Yes Aníbal Beltran MD   ibuprofen (ADVIL,MOTRIN) 800 MG tablet Take 1 tablet by mouth Every 6 (Six) Hours As Needed for Mild Pain.   Yes Aníbal Beltran MD   losartan (COZAAR) 100 MG tablet Take 1 tablet by mouth Daily. 3/2/23  Yes Wendy Andrade APRN   methylphenidate 36 MG CR tablet Take 1 tablet by mouth Daily - COTEMPLA - 11/3/22  Yes Aníbal Beltran MD   montelukast (SINGULAIR) 10 MG tablet Take 1 tablet by mouth Every Night.   Yes Aníbal Beltran MD   oxyCODONE-acetaminophen (Percocet) 5-325 MG per tablet Take 1 tablet by mouth Every 6 (Six) Hours As Needed for Severe Pain. 4/13/23  Yes Wendy Andrade APRN   sertraline (ZOLOFT) 100 MG tablet Take 50 mg by mouth Daily. 8/12/22  Yes Aníbal Beltran MD   traZODone (DESYREL) 50 MG tablet Take 1 tablet by mouth Every Night.   Yes Aníbal Beltran MD   vitamin D (ERGOCALCIFEROL) 1.25 MG (09586 UT) capsule capsule Take 1 capsule by mouth 1 (One) Time Per Week. 3/3/23  Yes Wendy Andrade APRN       Objective     Vital Signs: /82 (BP Location: Left arm, Patient Position: Sitting, Cuff Size: Adult)   Pulse 110   Temp 97.1 °F (36.2 °C) (Temporal)   Ht 167.6 cm (66\")   Wt (!) 149 kg (328 lb)   SpO2 97%   BMI 52.94 kg/m²   Physical Exam  Vitals and nursing note reviewed.   Constitutional:       General: She is not in acute distress.     Appearance: She is obese. She is not ill-appearing or toxic-appearing.   HENT:      Head: Normocephalic and atraumatic.      Mouth/Throat:      Mouth: Mucous membranes are moist.      Pharynx: Oropharynx is clear.   Cardiovascular:      Rate and Rhythm: Normal rate and regular rhythm.      Pulses: Normal pulses.      Heart sounds: Normal heart sounds.   Pulmonary:      Effort: Pulmonary effort is normal.      Breath sounds: No wheezing, rhonchi or rales.   Abdominal:      General: Bowel sounds are normal. There is no distension.      " Palpations: Abdomen is soft.      Tenderness: There is no abdominal tenderness.   Musculoskeletal:         General: No swelling or tenderness. Normal range of motion.      Cervical back: Normal range of motion and neck supple. No tenderness.   Skin:     General: Skin is warm and dry.      Findings: No erythema or rash.      Comments: Facial flushing   Neurological:      General: No focal deficit present.      Mental Status: She is alert and oriented to person, place, and time.   Psychiatric:         Mood and Affect: Mood normal.         Behavior: Behavior normal.         Thought Content: Thought content normal.         Judgment: Judgment normal.       Class 3 Severe Obesity (BMI >=40). Obesity-related health conditions include the following: obstructive sleep apnea, hypertension, GERD and osteoarthritis. Obesity is improving with lifestyle modifications. BMI is is above average; BMI management plan is completed. We discussed portion control, increasing exercise and Patient scheduled for bariatric surgery May 11th.      Assessment / Plan     Assessment/Plan:  Diagnoses and all orders for this visit:    1. Primary hypertension (Primary)    2. Class 3 severe obesity due to excess calories with serious comorbidity and body mass index (BMI) of 50.0 to 59.9 in adult    3. Anxiety and depression       Patient presents to the office today for a 1 week follow-up. See HPI above for details surrounding her most recent office visits regarding hypertension. Her blood pressure is well controlled in the office today at 128/82. Her morning blood pressure prior to taking medication are still elevated in the 160's-180's which improves after taking medication to the 120's-140's. Her symptoms have improved. She was placed on Coreg last week which has helped control her heart rate as well. She did report sleeping more for the first couple days after starting the medication, and this has improved. Will continue present regimen and  patient will continue to monitor her blood pressure at home and call for elevated readings. I suspect taking time off work and starting counseling will help with her blood pressure as well. My hope is that her medications may be able to be weaned slowly over time following her weight loss surgery as well. Will review [facility] paperwork.    Return in about 3 months (around 7/25/2023) for Recheck. unless patient needs to be seen sooner or acute issues arise.      I have discussed the patient results/orders and and plan/recommendation with them at today's visit.      Wendy Andrade, ZEIO   04/25/2023      Answers for HPI/ROS submitted by the patient on 4/25/2023  What is the primary reason for your visit?: High Blood Pressure

## 2023-04-26 ENCOUNTER — TELEPHONE (OUTPATIENT)
Dept: INTERNAL MEDICINE | Facility: CLINIC | Age: 43
End: 2023-04-26

## 2023-04-26 ENCOUNTER — LAB (OUTPATIENT)
Dept: LAB | Facility: HOSPITAL | Age: 43
End: 2023-04-26
Payer: COMMERCIAL

## 2023-04-26 ENCOUNTER — TREATMENT (OUTPATIENT)
Dept: BARIATRICS/WEIGHT MGMT | Facility: CLINIC | Age: 43
End: 2023-04-26
Payer: COMMERCIAL

## 2023-04-26 VITALS — BODY MASS INDEX: 47.09 KG/M2 | WEIGHT: 293 LBS | HEIGHT: 66 IN

## 2023-04-26 DIAGNOSIS — E66.01 CLASS 3 SEVERE OBESITY DUE TO EXCESS CALORIES WITH SERIOUS COMORBIDITY AND BODY MASS INDEX (BMI) OF 50.0 TO 59.9 IN ADULT: ICD-10-CM

## 2023-04-26 LAB
ALBUMIN SERPL-MCNC: 4.3 G/DL (ref 3.5–5.2)
ALBUMIN/GLOB SERPL: 1.9 G/DL
ALP SERPL-CCNC: 57 U/L (ref 39–117)
ALT SERPL W P-5'-P-CCNC: 42 U/L (ref 1–33)
ANION GAP SERPL CALCULATED.3IONS-SCNC: 12 MMOL/L (ref 5–15)
AST SERPL-CCNC: 29 U/L (ref 1–32)
BILIRUB SERPL-MCNC: 0.4 MG/DL (ref 0–1.2)
BILIRUB UR QL STRIP: NEGATIVE
BUN SERPL-MCNC: 8 MG/DL (ref 6–20)
BUN/CREAT SERPL: 11.4 (ref 7–25)
CALCIUM SPEC-SCNC: 8.6 MG/DL (ref 8.6–10.5)
CHLORIDE SERPL-SCNC: 102 MMOL/L (ref 98–107)
CLARITY UR: CLEAR
CO2 SERPL-SCNC: 25 MMOL/L (ref 22–29)
COLOR UR: YELLOW
CREAT SERPL-MCNC: 0.7 MG/DL (ref 0.57–1)
DEPRECATED RDW RBC AUTO: 45.5 FL (ref 37–54)
EGFRCR SERPLBLD CKD-EPI 2021: 109.5 ML/MIN/1.73
ERYTHROCYTE [DISTWIDTH] IN BLOOD BY AUTOMATED COUNT: 14 % (ref 12.3–15.4)
GLOBULIN UR ELPH-MCNC: 2.3 GM/DL
GLUCOSE SERPL-MCNC: 126 MG/DL (ref 65–99)
GLUCOSE UR STRIP-MCNC: NEGATIVE MG/DL
HBA1C MFR BLD: 5.6 % (ref 4.8–5.6)
HCT VFR BLD AUTO: 43.5 % (ref 34–46.6)
HGB BLD-MCNC: 13.3 G/DL (ref 12–15.9)
HGB UR QL STRIP.AUTO: NEGATIVE
KETONES UR QL STRIP: NEGATIVE
LEUKOCYTE ESTERASE UR QL STRIP.AUTO: NEGATIVE
MCH RBC QN AUTO: 27.3 PG (ref 26.6–33)
MCHC RBC AUTO-ENTMCNC: 30.6 G/DL (ref 31.5–35.7)
MCV RBC AUTO: 89.1 FL (ref 79–97)
NITRITE UR QL STRIP: NEGATIVE
PH UR STRIP.AUTO: 6.5 [PH] (ref 5–8)
PLATELET # BLD AUTO: 306 10*3/MM3 (ref 140–450)
PMV BLD AUTO: 8.9 FL (ref 6–12)
POTASSIUM SERPL-SCNC: 3.7 MMOL/L (ref 3.5–5.2)
PROT SERPL-MCNC: 6.6 G/DL (ref 6–8.5)
PROT UR QL STRIP: NEGATIVE
RBC # BLD AUTO: 4.88 10*6/MM3 (ref 3.77–5.28)
SODIUM SERPL-SCNC: 139 MMOL/L (ref 136–145)
SP GR UR STRIP: 1.01 (ref 1–1.03)
UROBILINOGEN UR QL STRIP: NORMAL
WBC NRBC COR # BLD: 8.18 10*3/MM3 (ref 3.4–10.8)

## 2023-04-26 PROCEDURE — 36415 COLL VENOUS BLD VENIPUNCTURE: CPT

## 2023-04-26 PROCEDURE — 83036 HEMOGLOBIN GLYCOSYLATED A1C: CPT

## 2023-04-26 PROCEDURE — 85027 COMPLETE CBC AUTOMATED: CPT

## 2023-04-26 PROCEDURE — 81003 URINALYSIS AUTO W/O SCOPE: CPT

## 2023-04-26 PROCEDURE — 80053 COMPREHEN METABOLIC PANEL: CPT

## 2023-04-26 NOTE — PROGRESS NOTES
"Date: 4/26/23    BOOTCAMP              Information and Education Class for Pre and Post                           Surgery Care, Diets and Daily Living.       Surgery Type: Sleeve Gastrectomy Consent on File    Height: 5'6\"  Weight: 342.2lbs  BMI:  55.23    Diet Stages Form given including diet stages and surgery dates/times   Weight Loss Surgery Patient Contract on File      Patient has signed/agreed with the Diet Stage & Medication discontinue sheet:                  1) Medications have been review by Dr Michele.                2) Patient informed to stop NSAID'S one week prior to surgery.                         If the patient is taking Metformin he/she will need to discontinue                   two weeks prior to surgery.                      Birth control Medications are to be discontinued two weeks prior                  and four week post surgery.                     They have also been instructed not to take                                          the following medications the morning of their procedure on the Bariatric Surgery Instructions Sheet:                3) Dr Michele/Surgeon recommends that this patient take the following two                   hours prior to their arrival time:                             A)   2 extra strength Tylenol (1000mg)                           B)    8 ounces (Only) of Sugar Free Gatorade, G2 or Powerade Zero                                   (no red or pink in color)                        4) During Bootcamp our patient also met with the Outpatient Surgery Staff  for pre-surgery instructions.      Patient also received [facility] Surgery Post Follow up Appointments.     DATE@  15:00 CST    "

## 2023-04-26 NOTE — TELEPHONE ENCOUNTER
Caller: DINORAH FROM [employer]    Relationship: Other    Best call back number: [phone], USE REFERENCE KEY [id number]    What is the best time to reach you: ANYTIME    Who are you requesting to speak with (clinical staff, provider,  specific staff member): CLINICAL    What was the call regarding: WANTING TO SEEK MEDICAL ANSWERS FOR SOME QUESTIONS THAT THEY HAVE FOR THE PATIENT    Do you require a callback: YES

## 2023-04-27 RX ORDER — SERTRALINE HYDROCHLORIDE 20 MG/ML
100 SOLUTION ORAL DAILY
Qty: 70 ML | Refills: 0 | Status: SHIPPED | OUTPATIENT
Start: 2023-04-27 | End: 2023-05-11

## 2023-05-11 ENCOUNTER — ANESTHESIA EVENT (OUTPATIENT)
Dept: PERIOP | Facility: HOSPITAL | Age: 43
End: 2023-05-11
Payer: COMMERCIAL

## 2023-05-11 ENCOUNTER — HOSPITAL ENCOUNTER (INPATIENT)
Facility: HOSPITAL | Age: 43
LOS: 1 days | Discharge: HOME | End: 2023-05-12
Attending: SURGERY | Admitting: SURGERY
Payer: COMMERCIAL

## 2023-05-11 ENCOUNTER — ANESTHESIA (OUTPATIENT)
Dept: PERIOP | Facility: HOSPITAL | Age: 43
End: 2023-05-11
Payer: COMMERCIAL

## 2023-05-11 DIAGNOSIS — D51.0 PERNICIOUS ANEMIA: ICD-10-CM

## 2023-05-11 DIAGNOSIS — Z87.19 HISTORY OF REPAIR OF HIATAL HERNIA: ICD-10-CM

## 2023-05-11 DIAGNOSIS — G47.30 SLEEP APNEA, UNSPECIFIED TYPE: ICD-10-CM

## 2023-05-11 DIAGNOSIS — Z98.84 STATUS POST LAPAROSCOPIC SLEEVE GASTRECTOMY: Primary | ICD-10-CM

## 2023-05-11 DIAGNOSIS — E66.01 CLASS 3 SEVERE OBESITY DUE TO EXCESS CALORIES WITH SERIOUS COMORBIDITY AND BODY MASS INDEX (BMI) OF 50.0 TO 59.9 IN ADULT: ICD-10-CM

## 2023-05-11 DIAGNOSIS — Z98.890 HISTORY OF REPAIR OF HIATAL HERNIA: ICD-10-CM

## 2023-05-11 DIAGNOSIS — K21.9 GASTROESOPHAGEAL REFLUX DISEASE, UNSPECIFIED WHETHER ESOPHAGITIS PRESENT: ICD-10-CM

## 2023-05-11 PROBLEM — K44.9 PARAESOPHAGEAL HERNIA: Status: ACTIVE | Noted: 2023-05-11

## 2023-05-11 LAB
ABO GROUP BLD: NORMAL
BLD GP AB SCN SERPL QL: NEGATIVE
GLUCOSE BLDC GLUCOMTR-MCNC: 135 MG/DL (ref 70–130)
RH BLD: NEGATIVE
T&S EXPIRATION DATE: NORMAL

## 2023-05-11 PROCEDURE — 0 HYDROMORPHONE 1 MG/ML SOLUTION: Performed by: SURGERY

## 2023-05-11 PROCEDURE — 25010000002 DEXAMETHASONE PER 1 MG: Performed by: ANESTHESIOLOGY

## 2023-05-11 PROCEDURE — 86900 BLOOD TYPING SEROLOGIC ABO: CPT | Performed by: SURGERY

## 2023-05-11 PROCEDURE — 82948 REAGENT STRIP/BLOOD GLUCOSE: CPT

## 2023-05-11 PROCEDURE — 25010000002 ONDANSETRON PER 1 MG: Performed by: SURGERY

## 2023-05-11 PROCEDURE — 0BQT4ZZ REPAIR DIAPHRAGM, PERCUTANEOUS ENDOSCOPIC APPROACH: ICD-10-PCS | Performed by: SURGERY

## 2023-05-11 PROCEDURE — 43775 LAP SLEEVE GASTRECTOMY: CPT | Performed by: SURGERY

## 2023-05-11 PROCEDURE — 25010000002 KETOROLAC TROMETHAMINE PER 15 MG: Performed by: NURSE ANESTHETIST, CERTIFIED REGISTERED

## 2023-05-11 PROCEDURE — 25010000002 DEXAMETHASONE PER 1 MG: Performed by: SURGERY

## 2023-05-11 PROCEDURE — 25010000002 KETOROLAC TROMETHAMINE PER 15 MG: Performed by: SURGERY

## 2023-05-11 PROCEDURE — 25010000002 FENTANYL CITRATE (PF) 100 MCG/2ML SOLUTION: Performed by: NURSE ANESTHETIST, CERTIFIED REGISTERED

## 2023-05-11 PROCEDURE — 25010000002 DIPHENHYDRAMINE PER 50 MG: Performed by: SURGERY

## 2023-05-11 PROCEDURE — 25010000002 HYDROMORPHONE 1 MG/ML SOLUTION: Performed by: NURSE ANESTHETIST, CERTIFIED REGISTERED

## 2023-05-11 PROCEDURE — 25010000002 PROPOFOL 10 MG/ML EMULSION: Performed by: NURSE ANESTHETIST, CERTIFIED REGISTERED

## 2023-05-11 PROCEDURE — 88307 TISSUE EXAM BY PATHOLOGIST: CPT | Performed by: SURGERY

## 2023-05-11 PROCEDURE — 86850 RBC ANTIBODY SCREEN: CPT | Performed by: SURGERY

## 2023-05-11 PROCEDURE — 0DB64Z3 EXCISION OF STOMACH, PERCUTANEOUS ENDOSCOPIC APPROACH, VERTICAL: ICD-10-PCS | Performed by: SURGERY

## 2023-05-11 PROCEDURE — 8E0W4CZ ROBOTIC ASSISTED PROCEDURE OF TRUNK REGION, PERCUTANEOUS ENDOSCOPIC APPROACH: ICD-10-PCS | Performed by: SURGERY

## 2023-05-11 PROCEDURE — 25010000002 CEFAZOLIN PER 500 MG: Performed by: SURGERY

## 2023-05-11 PROCEDURE — 25010000002 FENTANYL CITRATE (PF) 50 MCG/ML SOLUTION: Performed by: ANESTHESIOLOGY

## 2023-05-11 PROCEDURE — 25010000002 ONDANSETRON PER 1 MG: Performed by: NURSE ANESTHETIST, CERTIFIED REGISTERED

## 2023-05-11 PROCEDURE — 86901 BLOOD TYPING SEROLOGIC RH(D): CPT | Performed by: SURGERY

## 2023-05-11 PROCEDURE — 25010000002 SUGAMMADEX 200 MG/2ML SOLUTION: Performed by: NURSE ANESTHETIST, CERTIFIED REGISTERED

## 2023-05-11 PROCEDURE — 0 LIDOCAINE 1 % SOLUTION 20 ML VIAL: Performed by: SURGERY

## 2023-05-11 PROCEDURE — 25010000002 ENOXAPARIN PER 10 MG: Performed by: SURGERY

## 2023-05-11 PROCEDURE — 25010000002 DEXAMETHASONE PER 1 MG: Performed by: NURSE ANESTHETIST, CERTIFIED REGISTERED

## 2023-05-11 PROCEDURE — 25010000002 HYDROMORPHONE PER 4 MG: Performed by: ANESTHESIOLOGY

## 2023-05-11 PROCEDURE — 25010000002 NALOXONE PER 1 MG: Performed by: NURSE ANESTHETIST, CERTIFIED REGISTERED

## 2023-05-11 PROCEDURE — 25010000002 PROPOFOL 1000 MG/100ML EMULSION: Performed by: NURSE ANESTHETIST, CERTIFIED REGISTERED

## 2023-05-11 PROCEDURE — 25010000002 METOCLOPRAMIDE PER 10 MG: Performed by: SURGERY

## 2023-05-11 DEVICE — HEMOST ABS SURGICEL SNOW 4X4IN: Type: IMPLANTABLE DEVICE | Site: STOMACH | Status: FUNCTIONAL

## 2023-05-11 DEVICE — STAPLER 60 RELOAD BLUE
Type: IMPLANTABLE DEVICE | Site: STOMACH | Status: FUNCTIONAL
Brand: SUREFORM

## 2023-05-11 DEVICE — ABSORBABLE WOUND CLOSURE DEVICE
Type: IMPLANTABLE DEVICE | Site: STOMACH | Status: FUNCTIONAL
Brand: V-LOC 90

## 2023-05-11 DEVICE — STAPLER 60 RELOAD WHITE
Type: IMPLANTABLE DEVICE | Site: STOMACH | Status: FUNCTIONAL
Brand: SUREFORM

## 2023-05-11 RX ORDER — LABETALOL HYDROCHLORIDE 5 MG/ML
10 INJECTION, SOLUTION INTRAVENOUS
Status: DISCONTINUED | OUTPATIENT
Start: 2023-05-11 | End: 2023-05-12 | Stop reason: HOSPADM

## 2023-05-11 RX ORDER — GABAPENTIN 250 MG/5ML
250 SOLUTION ORAL ONCE
Status: COMPLETED | OUTPATIENT
Start: 2023-05-11 | End: 2023-05-11

## 2023-05-11 RX ORDER — FLUMAZENIL 0.1 MG/ML
0.2 INJECTION INTRAVENOUS AS NEEDED
Status: DISCONTINUED | OUTPATIENT
Start: 2023-05-11 | End: 2023-05-11 | Stop reason: HOSPADM

## 2023-05-11 RX ORDER — SODIUM CHLORIDE 0.9 % (FLUSH) 0.9 %
10 SYRINGE (ML) INJECTION EVERY 12 HOURS SCHEDULED
Status: DISCONTINUED | OUTPATIENT
Start: 2023-05-11 | End: 2023-05-11 | Stop reason: HOSPADM

## 2023-05-11 RX ORDER — SODIUM CHLORIDE, SODIUM LACTATE, POTASSIUM CHLORIDE, CALCIUM CHLORIDE 600; 310; 30; 20 MG/100ML; MG/100ML; MG/100ML; MG/100ML
1000 INJECTION, SOLUTION INTRAVENOUS CONTINUOUS
Status: DISCONTINUED | OUTPATIENT
Start: 2023-05-11 | End: 2023-05-11

## 2023-05-11 RX ORDER — BIFIDOBACTER. BIFIDUM/B.LONGUM 460 MG
1 CAPSULE ORAL DAILY
COMMUNITY

## 2023-05-11 RX ORDER — DEXAMETHASONE SODIUM PHOSPHATE 4 MG/ML
4 INJECTION, SOLUTION INTRA-ARTICULAR; INTRALESIONAL; INTRAMUSCULAR; INTRAVENOUS; SOFT TISSUE ONCE AS NEEDED
Status: COMPLETED | OUTPATIENT
Start: 2023-05-11 | End: 2023-05-11

## 2023-05-11 RX ORDER — FENTANYL CITRATE 50 UG/ML
25 INJECTION, SOLUTION INTRAMUSCULAR; INTRAVENOUS
Status: DISCONTINUED | OUTPATIENT
Start: 2023-05-11 | End: 2023-05-11 | Stop reason: HOSPADM

## 2023-05-11 RX ORDER — MAGNESIUM HYDROXIDE 1200 MG/15ML
LIQUID ORAL AS NEEDED
Status: DISCONTINUED | OUTPATIENT
Start: 2023-05-11 | End: 2023-05-11 | Stop reason: HOSPADM

## 2023-05-11 RX ORDER — ENOXAPARIN SODIUM 100 MG/ML
40 INJECTION SUBCUTANEOUS ONCE
Status: COMPLETED | OUTPATIENT
Start: 2023-05-11 | End: 2023-05-11

## 2023-05-11 RX ORDER — SODIUM CHLORIDE 0.9 % (FLUSH) 0.9 %
10 SYRINGE (ML) INJECTION AS NEEDED
Status: DISCONTINUED | OUTPATIENT
Start: 2023-05-11 | End: 2023-05-11 | Stop reason: HOSPADM

## 2023-05-11 RX ORDER — SODIUM CHLORIDE 0.9 % (FLUSH) 0.9 %
3 SYRINGE (ML) INJECTION AS NEEDED
Status: DISCONTINUED | OUTPATIENT
Start: 2023-05-11 | End: 2023-05-11 | Stop reason: HOSPADM

## 2023-05-11 RX ORDER — ONDANSETRON 2 MG/ML
4 INJECTION INTRAMUSCULAR; INTRAVENOUS EVERY 6 HOURS
Status: DISCONTINUED | OUTPATIENT
Start: 2023-05-11 | End: 2023-05-12 | Stop reason: HOSPADM

## 2023-05-11 RX ORDER — ROCURONIUM BROMIDE 10 MG/ML
INJECTION, SOLUTION INTRAVENOUS AS NEEDED
Status: DISCONTINUED | OUTPATIENT
Start: 2023-05-11 | End: 2023-05-11 | Stop reason: SURG

## 2023-05-11 RX ORDER — NALOXONE HCL 0.4 MG/ML
0.04 VIAL (ML) INJECTION AS NEEDED
Status: DISCONTINUED | OUTPATIENT
Start: 2023-05-11 | End: 2023-05-11 | Stop reason: HOSPADM

## 2023-05-11 RX ORDER — LIDOCAINE HYDROCHLORIDE 10 MG/ML
0.5 INJECTION, SOLUTION EPIDURAL; INFILTRATION; INTRACAUDAL; PERINEURAL ONCE AS NEEDED
Status: DISCONTINUED | OUTPATIENT
Start: 2023-05-11 | End: 2023-05-11 | Stop reason: HOSPADM

## 2023-05-11 RX ORDER — SCOLOPAMINE TRANSDERMAL SYSTEM 1 MG/1
1 PATCH, EXTENDED RELEASE TRANSDERMAL CONTINUOUS
Status: DISCONTINUED | OUTPATIENT
Start: 2023-05-11 | End: 2023-05-11 | Stop reason: HOSPADM

## 2023-05-11 RX ORDER — SCOLOPAMINE TRANSDERMAL SYSTEM 1 MG/1
1 PATCH, EXTENDED RELEASE TRANSDERMAL ONCE
Status: DISCONTINUED | OUTPATIENT
Start: 2023-05-11 | End: 2023-05-11 | Stop reason: SDUPTHER

## 2023-05-11 RX ORDER — KETOROLAC TROMETHAMINE 30 MG/ML
INJECTION, SOLUTION INTRAMUSCULAR; INTRAVENOUS AS NEEDED
Status: DISCONTINUED | OUTPATIENT
Start: 2023-05-11 | End: 2023-05-11 | Stop reason: SURG

## 2023-05-11 RX ORDER — DEXAMETHASONE SODIUM PHOSPHATE 4 MG/ML
4 INJECTION, SOLUTION INTRA-ARTICULAR; INTRALESIONAL; INTRAMUSCULAR; INTRAVENOUS; SOFT TISSUE EVERY 8 HOURS PRN
Status: DISCONTINUED | OUTPATIENT
Start: 2023-05-11 | End: 2023-05-12 | Stop reason: HOSPADM

## 2023-05-11 RX ORDER — PROPOFOL 10 MG/ML
INJECTION, EMULSION INTRAVENOUS CONTINUOUS PRN
Status: DISCONTINUED | OUTPATIENT
Start: 2023-05-11 | End: 2023-05-11 | Stop reason: SURG

## 2023-05-11 RX ORDER — DEXAMETHASONE SODIUM PHOSPHATE 4 MG/ML
INJECTION, SOLUTION INTRA-ARTICULAR; INTRALESIONAL; INTRAMUSCULAR; INTRAVENOUS; SOFT TISSUE AS NEEDED
Status: DISCONTINUED | OUTPATIENT
Start: 2023-05-11 | End: 2023-05-11 | Stop reason: SURG

## 2023-05-11 RX ORDER — CARVEDILOL 6.25 MG/1
12.5 TABLET ORAL 2 TIMES DAILY WITH MEALS
Status: DISCONTINUED | OUTPATIENT
Start: 2023-05-12 | End: 2023-05-12 | Stop reason: HOSPADM

## 2023-05-11 RX ORDER — METOCLOPRAMIDE HYDROCHLORIDE 5 MG/ML
5 INJECTION INTRAMUSCULAR; INTRAVENOUS EVERY 6 HOURS
Status: DISCONTINUED | OUTPATIENT
Start: 2023-05-11 | End: 2023-05-12 | Stop reason: HOSPADM

## 2023-05-11 RX ORDER — LIDOCAINE HYDROCHLORIDE 20 MG/ML
INJECTION, SOLUTION EPIDURAL; INFILTRATION; INTRACAUDAL; PERINEURAL AS NEEDED
Status: DISCONTINUED | OUTPATIENT
Start: 2023-05-11 | End: 2023-05-11 | Stop reason: SURG

## 2023-05-11 RX ORDER — PROPOFOL 10 MG/ML
VIAL (ML) INTRAVENOUS AS NEEDED
Status: DISCONTINUED | OUTPATIENT
Start: 2023-05-11 | End: 2023-05-11 | Stop reason: SURG

## 2023-05-11 RX ORDER — FAMOTIDINE 10 MG/ML
20 INJECTION, SOLUTION INTRAVENOUS EVERY 12 HOURS SCHEDULED
Status: DISCONTINUED | OUTPATIENT
Start: 2023-05-11 | End: 2023-05-12 | Stop reason: HOSPADM

## 2023-05-11 RX ORDER — ACETAMINOPHEN 160 MG/5ML
325 SOLUTION ORAL EVERY 6 HOURS
Status: DISCONTINUED | OUTPATIENT
Start: 2023-05-11 | End: 2023-05-12 | Stop reason: HOSPADM

## 2023-05-11 RX ORDER — SODIUM CHLORIDE 0.9 % (FLUSH) 0.9 %
1-10 SYRINGE (ML) INJECTION AS NEEDED
Status: DISCONTINUED | OUTPATIENT
Start: 2023-05-11 | End: 2023-05-12 | Stop reason: HOSPADM

## 2023-05-11 RX ORDER — CEFAZOLIN SODIUM IN 0.9 % NACL 3 G/100 ML
3 INTRAVENOUS SOLUTION, PIGGYBACK (ML) INTRAVENOUS ONCE
Status: COMPLETED | OUTPATIENT
Start: 2023-05-11 | End: 2023-05-11

## 2023-05-11 RX ORDER — KETOROLAC TROMETHAMINE 30 MG/ML
30 INJECTION, SOLUTION INTRAMUSCULAR; INTRAVENOUS EVERY 6 HOURS PRN
Status: DISCONTINUED | OUTPATIENT
Start: 2023-05-11 | End: 2023-05-12

## 2023-05-11 RX ORDER — ONDANSETRON 2 MG/ML
INJECTION INTRAMUSCULAR; INTRAVENOUS AS NEEDED
Status: DISCONTINUED | OUTPATIENT
Start: 2023-05-11 | End: 2023-05-11 | Stop reason: SURG

## 2023-05-11 RX ORDER — ENOXAPARIN SODIUM 100 MG/ML
40 INJECTION SUBCUTANEOUS DAILY
Status: DISCONTINUED | OUTPATIENT
Start: 2023-05-12 | End: 2023-05-12 | Stop reason: HOSPADM

## 2023-05-11 RX ORDER — TRAMADOL HYDROCHLORIDE 50 MG/1
50 TABLET ORAL EVERY 6 HOURS PRN
Status: DISCONTINUED | OUTPATIENT
Start: 2023-05-11 | End: 2023-05-12 | Stop reason: HOSPADM

## 2023-05-11 RX ORDER — NALOXONE HCL 0.4 MG/ML
0.1 VIAL (ML) INJECTION
Status: DISCONTINUED | OUTPATIENT
Start: 2023-05-11 | End: 2023-05-12 | Stop reason: HOSPADM

## 2023-05-11 RX ORDER — DIPHENHYDRAMINE HYDROCHLORIDE 50 MG/ML
25 INJECTION INTRAMUSCULAR; INTRAVENOUS EVERY 6 HOURS PRN
Status: DISCONTINUED | OUTPATIENT
Start: 2023-05-11 | End: 2023-05-12 | Stop reason: HOSPADM

## 2023-05-11 RX ORDER — DEXTROSE MONOHYDRATE 25 G/50ML
12.5 INJECTION, SOLUTION INTRAVENOUS AS NEEDED
Status: DISCONTINUED | OUTPATIENT
Start: 2023-05-11 | End: 2023-05-11 | Stop reason: HOSPADM

## 2023-05-11 RX ORDER — LABETALOL HYDROCHLORIDE 5 MG/ML
5 INJECTION, SOLUTION INTRAVENOUS
Status: DISCONTINUED | OUTPATIENT
Start: 2023-05-11 | End: 2023-05-11 | Stop reason: HOSPADM

## 2023-05-11 RX ORDER — SODIUM CHLORIDE, SODIUM LACTATE, POTASSIUM CHLORIDE, CALCIUM CHLORIDE 600; 310; 30; 20 MG/100ML; MG/100ML; MG/100ML; MG/100ML
9 INJECTION, SOLUTION INTRAVENOUS CONTINUOUS
Status: DISCONTINUED | OUTPATIENT
Start: 2023-05-11 | End: 2023-05-11

## 2023-05-11 RX ORDER — ONDANSETRON 2 MG/ML
4 INJECTION INTRAMUSCULAR; INTRAVENOUS EVERY 6 HOURS PRN
Status: DISCONTINUED | OUTPATIENT
Start: 2023-05-11 | End: 2023-05-11 | Stop reason: HOSPADM

## 2023-05-11 RX ORDER — LOSARTAN POTASSIUM 50 MG/1
100 TABLET ORAL DAILY
Status: DISCONTINUED | OUTPATIENT
Start: 2023-05-12 | End: 2023-05-12 | Stop reason: HOSPADM

## 2023-05-11 RX ORDER — SODIUM CHLORIDE 0.9 % (FLUSH) 0.9 %
10 SYRINGE (ML) INJECTION EVERY 12 HOURS SCHEDULED
Status: DISCONTINUED | OUTPATIENT
Start: 2023-05-11 | End: 2023-05-12 | Stop reason: HOSPADM

## 2023-05-11 RX ORDER — SIMETHICONE 80 MG
40 TABLET,CHEWABLE ORAL 4 TIMES DAILY PRN
Status: DISCONTINUED | OUTPATIENT
Start: 2023-05-11 | End: 2023-05-12 | Stop reason: HOSPADM

## 2023-05-11 RX ORDER — FENTANYL CITRATE 50 UG/ML
INJECTION, SOLUTION INTRAMUSCULAR; INTRAVENOUS AS NEEDED
Status: DISCONTINUED | OUTPATIENT
Start: 2023-05-11 | End: 2023-05-11 | Stop reason: SURG

## 2023-05-11 RX ORDER — SODIUM CHLORIDE, SODIUM LACTATE, POTASSIUM CHLORIDE, CALCIUM CHLORIDE 600; 310; 30; 20 MG/100ML; MG/100ML; MG/100ML; MG/100ML
140 INJECTION, SOLUTION INTRAVENOUS CONTINUOUS
Status: DISCONTINUED | OUTPATIENT
Start: 2023-05-11 | End: 2023-05-12

## 2023-05-11 RX ORDER — HYDROMORPHONE HYDROCHLORIDE 1 MG/ML
0.5 INJECTION, SOLUTION INTRAMUSCULAR; INTRAVENOUS; SUBCUTANEOUS
Status: DISCONTINUED | OUTPATIENT
Start: 2023-05-11 | End: 2023-05-11 | Stop reason: HOSPADM

## 2023-05-11 RX ORDER — NALOXONE HYDROCHLORIDE 0.4 MG/ML
INJECTION, SOLUTION INTRAMUSCULAR; INTRAVENOUS; SUBCUTANEOUS AS NEEDED
Status: DISCONTINUED | OUTPATIENT
Start: 2023-05-11 | End: 2023-05-11 | Stop reason: SURG

## 2023-05-11 RX ORDER — SODIUM CHLORIDE 9 MG/ML
40 INJECTION, SOLUTION INTRAVENOUS AS NEEDED
Status: DISCONTINUED | OUTPATIENT
Start: 2023-05-11 | End: 2023-05-12 | Stop reason: HOSPADM

## 2023-05-11 RX ADMIN — FENTANYL CITRATE 25 MCG: 50 INJECTION, SOLUTION INTRAMUSCULAR; INTRAVENOUS at 11:34

## 2023-05-11 RX ADMIN — SODIUM CHLORIDE, POTASSIUM CHLORIDE, SODIUM LACTATE AND CALCIUM CHLORIDE 500 ML: 600; 310; 30; 20 INJECTION, SOLUTION INTRAVENOUS at 06:15

## 2023-05-11 RX ADMIN — PROPOFOL 200 MG: 10 INJECTION, EMULSION INTRAVENOUS at 07:06

## 2023-05-11 RX ADMIN — NALOXONE HYDROCHLORIDE 0.04 MG: 0.4 INJECTION, SOLUTION INTRAMUSCULAR; INTRAVENOUS; SUBCUTANEOUS at 10:23

## 2023-05-11 RX ADMIN — SODIUM CHLORIDE, POTASSIUM CHLORIDE, SODIUM LACTATE AND CALCIUM CHLORIDE 140 ML/HR: 600; 310; 30; 20 INJECTION, SOLUTION INTRAVENOUS at 12:52

## 2023-05-11 RX ADMIN — SUGAMMADEX 200 MG: 100 INJECTION, SOLUTION INTRAVENOUS at 10:00

## 2023-05-11 RX ADMIN — DEXAMETHASONE SODIUM PHOSPHATE 4 MG: 4 INJECTION, SOLUTION INTRA-ARTICULAR; INTRALESIONAL; INTRAMUSCULAR; INTRAVENOUS; SOFT TISSUE at 21:06

## 2023-05-11 RX ADMIN — HYDROMORPHONE HYDROCHLORIDE 0.5 MG: 1 INJECTION, SOLUTION INTRAMUSCULAR; INTRAVENOUS; SUBCUTANEOUS at 18:30

## 2023-05-11 RX ADMIN — ROCURONIUM BROMIDE 25 MG: 10 INJECTION, SOLUTION INTRAVENOUS at 08:42

## 2023-05-11 RX ADMIN — LIDOCAINE HYDROCHLORIDE 100 MG: 20 INJECTION, SOLUTION EPIDURAL; INFILTRATION; INTRACAUDAL; PERINEURAL at 07:06

## 2023-05-11 RX ADMIN — CEFAZOLIN 2 G: 2 INJECTION, POWDER, FOR SOLUTION INTRAMUSCULAR; INTRAVENOUS at 15:26

## 2023-05-11 RX ADMIN — ONDANSETRON 4 MG: 2 INJECTION INTRAMUSCULAR; INTRAVENOUS at 09:45

## 2023-05-11 RX ADMIN — KETOROLAC TROMETHAMINE 30 MG: 30 INJECTION, SOLUTION INTRAMUSCULAR; INTRAVENOUS at 23:40

## 2023-05-11 RX ADMIN — KETOROLAC TROMETHAMINE 30 MG: 30 INJECTION, SOLUTION INTRAMUSCULAR; INTRAVENOUS at 09:58

## 2023-05-11 RX ADMIN — METOCLOPRAMIDE HYDROCHLORIDE 5 MG: 5 INJECTION INTRAMUSCULAR; INTRAVENOUS at 13:36

## 2023-05-11 RX ADMIN — ENOXAPARIN SODIUM 40 MG: 100 INJECTION SUBCUTANEOUS at 06:49

## 2023-05-11 RX ADMIN — PROPOFOL 150 MCG/KG/MIN: 10 INJECTION, EMULSION INTRAVENOUS at 09:14

## 2023-05-11 RX ADMIN — METOCLOPRAMIDE HYDROCHLORIDE 5 MG: 5 INJECTION INTRAMUSCULAR; INTRAVENOUS at 20:38

## 2023-05-11 RX ADMIN — DIPHENHYDRAMINE HYDROCHLORIDE 25 MG: 50 INJECTION, SOLUTION INTRAMUSCULAR; INTRAVENOUS at 21:06

## 2023-05-11 RX ADMIN — Medication 10 ML: at 12:52

## 2023-05-11 RX ADMIN — Medication 10 ML: at 20:38

## 2023-05-11 RX ADMIN — HYDROMORPHONE HYDROCHLORIDE 0.5 MG: 1 INJECTION, SOLUTION INTRAMUSCULAR; INTRAVENOUS; SUBCUTANEOUS at 12:12

## 2023-05-11 RX ADMIN — ROCURONIUM BROMIDE 50 MG: 10 INJECTION, SOLUTION INTRAVENOUS at 07:06

## 2023-05-11 RX ADMIN — PROPOFOL 150 MCG/KG/MIN: 10 INJECTION, EMULSION INTRAVENOUS at 07:52

## 2023-05-11 RX ADMIN — SCOPALAMINE 1 PATCH: 1 PATCH, EXTENDED RELEASE TRANSDERMAL at 06:49

## 2023-05-11 RX ADMIN — FENTANYL CITRATE 100 MCG: 50 INJECTION INTRAMUSCULAR; INTRAVENOUS at 07:01

## 2023-05-11 RX ADMIN — ACETAMINOPHEN 325 MG: 325 SUSPENSION ORAL at 20:37

## 2023-05-11 RX ADMIN — HYDROMORPHONE HYDROCHLORIDE 0.5 MG: 1 INJECTION, SOLUTION INTRAMUSCULAR; INTRAVENOUS; SUBCUTANEOUS at 13:36

## 2023-05-11 RX ADMIN — SODIUM CHLORIDE, POTASSIUM CHLORIDE, SODIUM LACTATE AND CALCIUM CHLORIDE 140 ML/HR: 600; 310; 30; 20 INJECTION, SOLUTION INTRAVENOUS at 20:41

## 2023-05-11 RX ADMIN — HYDROMORPHONE HYDROCHLORIDE 0.5 MG: 1 INJECTION, SOLUTION INTRAMUSCULAR; INTRAVENOUS; SUBCUTANEOUS at 21:05

## 2023-05-11 RX ADMIN — DEXAMETHASONE SODIUM PHOSPHATE 4 MG: 4 INJECTION INTRA-ARTICULAR; INTRALESIONAL; INTRAMUSCULAR; INTRAVENOUS; SOFT TISSUE at 06:49

## 2023-05-11 RX ADMIN — NALOXONE HYDROCHLORIDE 0.04 MG: 0.4 INJECTION, SOLUTION INTRAMUSCULAR; INTRAVENOUS; SUBCUTANEOUS at 10:33

## 2023-05-11 RX ADMIN — NALOXONE HYDROCHLORIDE 0.04 MG: 0.4 INJECTION, SOLUTION INTRAMUSCULAR; INTRAVENOUS; SUBCUTANEOUS at 10:35

## 2023-05-11 RX ADMIN — ROCURONIUM BROMIDE 25 MG: 10 INJECTION, SOLUTION INTRAVENOUS at 07:50

## 2023-05-11 RX ADMIN — ONDANSETRON 4 MG: 2 INJECTION INTRAMUSCULAR; INTRAVENOUS at 15:26

## 2023-05-11 RX ADMIN — FAMOTIDINE 20 MG: 10 INJECTION INTRAVENOUS at 13:36

## 2023-05-11 RX ADMIN — SODIUM CHLORIDE, POTASSIUM CHLORIDE, SODIUM LACTATE AND CALCIUM CHLORIDE 1000 ML: 600; 310; 30; 20 INJECTION, SOLUTION INTRAVENOUS at 06:15

## 2023-05-11 RX ADMIN — FAMOTIDINE 20 MG: 10 INJECTION INTRAVENOUS at 20:38

## 2023-05-11 RX ADMIN — PROPOFOL 60 MG: 10 INJECTION, EMULSION INTRAVENOUS at 08:22

## 2023-05-11 RX ADMIN — ACETAMINOPHEN 325 MG: 325 SUSPENSION ORAL at 13:36

## 2023-05-11 RX ADMIN — DEXAMETHASONE SODIUM PHOSPHATE 8 MG: 4 INJECTION INTRA-ARTICULAR; INTRALESIONAL; INTRAMUSCULAR; INTRAVENOUS; SOFT TISSUE at 07:14

## 2023-05-11 RX ADMIN — KETOROLAC TROMETHAMINE 30 MG: 30 INJECTION, SOLUTION INTRAMUSCULAR; INTRAVENOUS at 16:39

## 2023-05-11 RX ADMIN — PROPOFOL 150 MCG/KG/MIN: 10 INJECTION, EMULSION INTRAVENOUS at 08:40

## 2023-05-11 RX ADMIN — HYDROMORPHONE HYDROCHLORIDE 1 MG: 1 INJECTION, SOLUTION INTRAMUSCULAR; INTRAVENOUS; SUBCUTANEOUS at 09:48

## 2023-05-11 RX ADMIN — HYDROMORPHONE HYDROCHLORIDE 0.5 MG: 1 INJECTION, SOLUTION INTRAMUSCULAR; INTRAVENOUS; SUBCUTANEOUS at 11:47

## 2023-05-11 RX ADMIN — HYDROMORPHONE HYDROCHLORIDE 0.5 MG: 1 INJECTION, SOLUTION INTRAMUSCULAR; INTRAVENOUS; SUBCUTANEOUS at 08:46

## 2023-05-11 RX ADMIN — NALOXONE HYDROCHLORIDE 0.04 MG: 0.4 INJECTION, SOLUTION INTRAMUSCULAR; INTRAVENOUS; SUBCUTANEOUS at 10:27

## 2023-05-11 RX ADMIN — HYDROMORPHONE HYDROCHLORIDE 0.5 MG: 1 INJECTION, SOLUTION INTRAMUSCULAR; INTRAVENOUS; SUBCUTANEOUS at 08:22

## 2023-05-11 RX ADMIN — GABAPENTIN 250 MG: 250 SOLUTION ORAL at 05:44

## 2023-05-11 RX ADMIN — PROPOFOL 200 MG: 10 INJECTION, EMULSION INTRAVENOUS at 10:06

## 2023-05-11 RX ADMIN — ONDANSETRON 4 MG: 2 INJECTION INTRAMUSCULAR; INTRAVENOUS at 21:09

## 2023-05-11 RX ADMIN — Medication 3 G: at 07:20

## 2023-05-11 RX ADMIN — NALOXONE HYDROCHLORIDE 0.04 MG: 0.4 INJECTION, SOLUTION INTRAMUSCULAR; INTRAVENOUS; SUBCUTANEOUS at 10:31

## 2023-05-11 RX ADMIN — CEFAZOLIN 2 G: 2 INJECTION, POWDER, FOR SOLUTION INTRAMUSCULAR; INTRAVENOUS at 23:40

## 2023-05-11 RX ADMIN — FENTANYL CITRATE 25 MCG: 50 INJECTION, SOLUTION INTRAMUSCULAR; INTRAVENOUS at 11:29

## 2023-05-11 RX ADMIN — PROPOFOL 150 MCG/KG/MIN: 10 INJECTION, EMULSION INTRAVENOUS at 07:08

## 2023-05-11 NOTE — PLAN OF CARE
Goal Outcome Evaluation:  Plan of Care Reviewed With: patient, mother        Progress: no change  Outcome Evaluation: PACU admit to 377. Gastric Sleeve with Hernia repair, lap site x6, CDI and ABD binder in use. NPO-ice chips/sips with meds. C/o ABD pain, PRN and scheduled meds given as ordered. Assist x stand-by to ambulate-ambulated in hallway <4 hrs post-op, tolerated well. Mother at bedside assisting with patient care. On room air and , voiding w/o difficulty, IVF and IV ABX given. Lovenox and SCD's for VTE prevention. Call light in reach, safety maintained and continue to monitor.

## 2023-05-11 NOTE — OP NOTE
GASTRIC SLEEVE LAPAROSCOPIC WITH DAVINCI ROBOT   Op NOTE    Allison Mckeon  5/11/2023     Pre-op Diagnosis:   Class 3 severe obesity due to excess calories with serious comorbidity and body mass index (BMI) of 50.0 to 59.9 in adult [E66.01, Z68.43]  Gastroesophageal reflux disease, unspecified whether esophagitis present [K21.9]  Sleep apnea, unspecified type [G47.30]       Post-Op Diagnosis Codes:     * Class 3 severe obesity due to excess calories with serious comorbidity and body mass index (BMI) of 50.0 to 59.9 in adult [E66.01, Z68.43]     * Gastroesophageal reflux disease, unspecified whether esophagitis present [K21.9]     * Sleep apnea, unspecified type [G47.30]     * Paraesophageal hernia [K44.9]      Indications: The patient was admitted to the hospital with history of morbid obesity with a Body mass index is 53.4 kg/m².   she is scheduled for a Laparoscopic Sleeve Gastrectomy with robotic assistance with intraoperative findings of a paraesophageal hernia.       Surgeon: Binh Michele MD     Assistants: Jocelyn    Anesthesia: General endotracheal anesthesia    ASA Class: 3, 4          Procedure Details       After the patient was explained the alternatives, benefits, complications, and risks of the robotic-assisted laparoscopic sleeve gastrectomy an informed consent was provided.  The patient was brought to the OR suite after receiving preoperative antibiotics, medications and anticoagulation.  The patient was placed under general anesthesia.  The patient was then prepped and draped in standard fashion.  We performed a surgical timeout.  An 40 Polish bougie was placed into the oropharynx by anesthesia followed by placement to suction.  I then proceeded to locally anesthetize the left upper quadrant site for placement of a Veress needle to insufflate the abdominal cavity to a pressure of 15 mmHg.  This was followed by placement of an 8 mm incision which was followed by placement of an 8 mm trocar into the  abdominal cavity with camera assist location left upper quadrant.   This trocar was my AirSeal trocar and it was placed into the abdominal cavity under direct visualization.  The Veress needle was identified and removed safely.  An 8 mm incision was made in the periumbilical midline location for placement of an 8 mm trocar under direct visualization, location approximately 28 cm from the sternal notch.  An 12 mm trocar was placed on the patient's right upper quadrant lateral to the periumbilical trocar under direct visualization as well as an 8 mm trocar placed in the left upper quadrant's lateral site along the same plane under direct visualization. This was then followed by placement of an 8 mm trocar in the left quadrant lateral to the site additionally.   A 5 mm incision was placed in the subxiphoid location for placement of a 5 mm trocar under direct visualization.  I removed this trocar and replaced it with a Yissel liver retractor.  There was fat on the anterior portion of the stomach which was dimpled into the hiatus.  Further retraction of the fat revealed a paraesophageal hernia defect.  I then decided to repair the hiatal hernia defect.  This was performed prior to creation of the sleeve.  Once adequate exposure of the hiatus and stomach was obtained, I proceeded to dock the robot to the trocars.  This was assisted by targeting.  My robotic instruments were then placed under direct visualization into the surgical field.  After breaking scrub away from the surgical table I went to the robotic console and began the procedure.   First I proceeded with dissecting the fat pad near the angle of His away from the diaphragm.    The paraesophageal hernia defect appeared to be type I in nature.  There was significant anterior fat on the proximal portion of the stomach which was resected utilizing the vessel sealer energy.  Once mobilization of this fat which was significant was removed to expose the anterior  portion of the stomach and left crura muscles I proceeded with creating a window on the medial aspect of the stomach between the gastro hepatic ligament.  Significant fat was identified on the right crura muscle and stomach.  I created a window in the posterior aspect of the stomach and esophagus from the right side to the left side of the stomach.  I elevated this with blunt dissection to pass a Penrose around the esophagus which housed the bougie.  I then continued to dissect laterally and circumferentially around the esophagus to free the stomach from its hiatal hernia defect and sac.  I utilized the vessel sealer to control hemostasis and transect the hernia sac away from the posterior aspect of the stomach and esophagus.  Care was made to protect the vascular structures and vagus nerve.  There was significant posterior fat which required to be transected away as well to adequately expose the left crural muscle on the opposite side.  Once adequate exposure of the right and left crura muscles were obtained and the stomach was reduced providing adequate length of the esophagus into the abdominal cavity I proceeded with reapproximating the right and left crura muscles.  My first suture was a V-Loc stay suture used to reapproximate the right and left crura muscles.  This was done in a running fashion.  And then I utilized Ethibond suture x2 in an interrupted fashion to reapproximate and secured the right left crura muscles posteriorly.  Once the hiatal hernia was reduced and the crura muscles were repaired by reapproximation I took my attention to creating the sleeve gastrectomy.  I then proceeded with ligating the greater curvature vessels starting at the mid greater curvature working my way distally and proximally where my most distal ligation site was 6 cm from the pylorus.  I continued proximally along the greater curvature ligating the vessels as well as a short gastrics.  This was all accomplished using the  vessel sealer.  Once completion of ligation of the vessels was obtained. I dissected away attachments found posteriorly to the stomach.  Care was made to assure no injury to the pancreas. Completion of my dissection was obtained once visualization of the posterior left crural muscle was seen.  I proceeded with marking the stomach in standard fashion.  Marking points visually which was approximately 1-1/2 cm from the GE junction, point B which is approximately 3 cm from the angularis incisura and finally point C which is approximately 6 cm proximal from the pylorus.  The sleeve gastrectomy was then created.  I proceeded with stapling the stomach in this fashion based off of these points.  I fired my most distal load first and completed the firing of the stapler device to create the sleeve gastrectomy hugging the bougie.  Again my most distal staple line was approximately 6 cm from the pylorus and I continued proximally along the greater curvature and assuring that the width from the angularis incisura was at least 3 cm.  My final proximal stapler was at least 1-2 cm from the GE junction.    I then assessed the staple line to observe for hemostasis.  Once hemostasis was confirmed I then proceeded with my leak test.  I requested insufflation of the bougie into the sleeve utilizing air and submerging the staple line under saline.  I observed for any evidence of bubbles or leakage.  There was no evidence of leakage or bubbles noted and I then proceeded with suctioning out the air from the sleeve.  Once there was no evidence of leakage I then requested that the bougie be placed off suction, given a small puff and removed under direct visualization.    I placed Surgicel SNoW on the staple line under direct visualization.    I then pexied the omentum to the proximal portion of the sleeve with 2-0 absorbable suture as well as an additional imbricating suture on the midportion of the sleeve with omentum.  I reassessed for  hemostasis which was confirmed under direct visualization and then proceeded to scrub back into the case for removal of the resected portion of the stomach after the robotic instruments were removed under direct visualization.  The resected portion of the stomach was removed from the 12 mm trocar site under direct visualization.  The transected fat that was around the proximal portion of the stomach was removed by placement of an Endo cinch bag to collect the fatty tissue.  This was removed under direct visualization through the 12 mm trocar site.  The 12 mm trocar site's fascia was reapproximated using a Zaid-Will device and an 0 Vicryl suture.   The liver retractor was removed under direct visualization as well as the pneumoperitoneum and remaining trocars.  Then re-locally anesthetized skin wounds for closure.  Skin wounds were closed with 4-0 Monocryl.  Prior to closing skin wounds all lap pads and attachments were accounted for at the end of the procedure.      White staples: 5  Blue staples: 1    Findings: Paraesophageal hernia/hiatal hernia defect    Estimated Blood Loss:  minimal                    Total IV Fluids: 800 mL           Specimens:   Specimens     ID Source Type Tests Collected By Collected At Frozen?    A Gastric, Fundus Tissue · TISSUE PATHOLOGY EXAM   Binh Michele MD 5/11/23 0731 No                 Implants:   Implant Name Type Inv. Item Serial No.  Lot No. LRB No. Used Action   HEMOST ABS SURGICEL SNOW 4X4IN - [id number] Implant HEMOST ABS SURGICEL SNOW 4X4IN  [employer]  [employer] J [employer] J [id number] N/A 1 Implanted   RELOAD STPLR SUREFORM 60 DAVINCI/X/XI 6ROW 2.5 WHT 1P/U - [id number] Implant RELOAD STPLR SUREFORM 60 DAVINCI/X/XI 6ROW 2.5 WHT 1P/U  INTUITIVE SURGICAL [id number] N/A 5 Implanted   RELOAD STPLR SUREFORM 60 DAVINCI/X/XI 6ROW 3.5 JAGUAR 1P/U - [id number] Implant RELOAD STPLR SUREFORM 60 DAVINCI/X/XI 6ROW 3.5 JAGUAR 1P/U  INTUITIVE SURGICAL [id number] N/A 1 Implanted   DEV CLS  WND VLOC/90 CHRISTEN ABS 1/2CIR SZ3/0 26MM 23CM LUIS A - [id number] Implant DEV CLS WND VLOC/90 CHRISTEN ABS 1/2CIR SZ3/0 26MM 23CM LUIS A  COVEDER [id number] N/A 1 Implanted              Complications: None           Disposition: PACU - hemodynamically stable.           Condition: stable

## 2023-05-11 NOTE — BRIEF OP NOTE
GASTRIC SLEEVE LAPAROSCOPIC WITH DAVINCI ROBOT  Progress Note    Allison Mckeon  5/11/2023    Pre-op Diagnosis:   Class 3 severe obesity due to excess calories with serious comorbidity and body mass index (BMI) of 50.0 to 59.9 in adult [E66.01, Z68.43]  Gastroesophageal reflux disease, unspecified whether esophagitis present [K21.9]  Sleep apnea, unspecified type [G47.30]       Post-Op Diagnosis Codes:     * Class 3 severe obesity due to excess calories with serious comorbidity and body mass index (BMI) of 50.0 to 59.9 in adult [E66.01, Z68.43]     * Gastroesophageal reflux disease, unspecified whether esophagitis present [K21.9]     * Sleep apnea, unspecified type [G47.30]     * Paraesophageal hernia [K44.9]    Procedure/CPT® Codes:        Procedure(s):  GASTRIC SLEEVE LAPAROSCOPIC AND HIATAL HERNIA REPAIR WITH DAVINCI ROBOT        Surgeon(s):  Bnih Michele MD    Anesthesia: General    Staff:   Circulator: Maryellen Hoffman RN; Dalton Tanner RN  Scrub Person: Haley Hernandez; Rose Abreu         Estimated Blood Loss: minimal    Urine Voided: * No values recorded between 5/11/2023  7:00 AM and 5/11/2023  9:57 AM *    Specimens:                Specimens     ID Source Type Tests Collected By Collected At Frozen?    A Gastric, Fundus Tissue · TISSUE PATHOLOGY EXAM   Binh Michele MD 5/11/23 0731 No            Findings: Paraesophageal hernia type I        Complications: None          Binh Michele MD     Date: 5/11/2023  Time: 10:03 CDT

## 2023-05-11 NOTE — ANESTHESIA PROCEDURE NOTES
Airway  Urgency: elective    Date/Time: 5/11/2023 7:08 AM  Airway not difficult    General Information and Staff    Patient location during procedure: OR  CRNA/CAA: Kei Bradley CRNA    Indications and Patient Condition  Indications for airway management: airway protection    Preoxygenated: yes  Mask difficulty assessment: 0 - not attempted    Final Airway Details  Final airway type: endotracheal airway      Successful airway: ETT  Cuffed: yes   Successful intubation technique: direct laryngoscopy  Blade: Sameer  Blade size: 3.5  ETT size (mm): 7.5  Cormack-Lehane Classification: grade IIa - partial view of glottis  Placement verified by: chest auscultation and capnometry   Cuff volume (mL): 6  Measured from: teeth  ETT/EBT  to teeth (cm): 22  Number of attempts at approach: 1  Assessment: lips, teeth, and gum same as pre-op and atraumatic intubation

## 2023-05-11 NOTE — PROGRESS NOTES
Patient Care Team:  Wendy Andrade APRN as PCP - General (Nurse Practitioner)    Reason for Visit:  Surgical Weight loss    Subjective      Allison Mckeon is a pleasant 42 y.o. female and presents with morbid obesity with her Body mass index is 54.81 kg/m².    She is here for discussion of weight loss options.  She stated she has been with the disease of obesity for year(s).  She stated she suffers from GERD, obstructive sleep apnea and morbid obesity due to her weight gain.  She stated that weight loss helps alleviate these symptoms.   She stated that she has tried multiple diet regimens including completing a medically supervised weight loss program to help with weight loss.  She stated that she has attempted these conservative methods for weight loss without maintaining long term success.  Today she would like to discuss surgical weight loss options such as the Laparoscopic Sleeve Gastrectomy or the Laparoscopic R - Y Gastric Bypass.      Review of Systems  General ROS: positive for  - fatigue and sleep disturbance  Psychological ROS: negative  Respiratory ROS: no cough, shortness of breath, or wheezing  Cardiovascular ROS: no chest pain or dyspnea on exertion  Gastrointestinal ROS: no abdominal pain, change in bowel habits, or black or bloody stools    History  Past Medical History:   Diagnosis Date   • ADHD (attention deficit hyperactivity disorder) 1991   • Allergic 1996    Avocado; Anaphylaxis   • Allergic rhinitis 1995   • Anxiety 1994   • Asthma 2010   • Cholelithiasis 2003   • Colon polyp 2010   • Depression 1996   • Disease of thyroid gland 2012    Hashimotos   • Fibromyalgia, primary 2012   • GERD (gastroesophageal reflux disease)    • Glaucoma 2010    Elevated Pressure   • Headache 1996   • Hernia    • Hypertension    • Obesity    • Personal history of COVID-19 02/2023   • PONV (postoperative nausea and vomiting)    • Sleep apnea     Does Not use CPAP     Past Surgical History:   Procedure  Laterality Date   • ADENOIDECTOMY     •  SECTION     • CHOLECYSTECTOMY     • COLONOSCOPY     • ENDOMETRIAL ABLATION     • ENDOSCOPY N/A 2023    Procedure: ESOPHAGOGASTRODUODENOSCOPY WITH ANESTHESIA;  Surgeon: Binh Michele MD;  Location: [facility] ENDOSCOPY;  Service: General;  Laterality: N/A;  pre screen  post esophagitis      • HYSTERECTOMY     • SINUS SURGERY     • SUBTOTAL HYSTERECTOMY     • TONSILLECTOMY     • TUBAL ABDOMINAL LIGATION      Reversed in      Family History   Adopted: Yes   Problem Relation Age of Onset   • Asthma Mother    • Thyroid disease Mother         Hyper   • Depression Father    • Learning disabilities Father         Dyslexia   • Mental illness Father         Clinical Psycopath   • Heart disease Maternal Grandmother    • Lung cancer Maternal Grandmother    • Colon cancer Maternal Grandmother    • Cancer Maternal Grandmother         Colon Cancer (Twice)   • Pancreatic cancer Maternal Grandfather    • Cancer Maternal Grandfather         Pancreas Cancer   • Thyroid disease Maternal Grandfather         Hypo   • Cancer Paternal Grandmother         Colon & Lung Cancer   • Diabetes Paternal Grandmother    • Vision loss Paternal Grandmother    • Early death Paternal Grandfather         Aneurysm < 40 years   • Learning disabilities Son    • Cancer Maternal Aunt         Breast Cancer < 40 years   • Early death Paternal Aunt         Breast Cancer < 40 years     Social History     Tobacco Use   • Smoking status: Never   • Smokeless tobacco: Never   Vaping Use   • Vaping Use: Never used   Substance Use Topics   • Alcohol use: Not Currently     Alcohol/week: 2.0 standard drinks     Types: 2 Glasses of wine per week     Comment: social   • Drug use: Never     E-cigarette/Vaping   • E-cigarette/Vaping Use Never User      E-cigarette/Vaping Substances   • Nicotine No    • THC No    • CBD No    • Flavoring No      (Not in a hospital admission)    Allergies:   Avocado, Prilosec [omeprazole], and Promethazine    No current facility-administered medications for this visit.  No current outpatient medications on file.    Facility-Administered Medications Ordered in Other Visits:   •  ceFAZolin in Sodium Chloride (ANCEF) IVPB solution 3 g, 3 g, Intravenous, Once, Binh Michele MD  •  dextrose (D50W) (25 g/50 mL) IV injection 12.5 g, 12.5 g, Intravenous, PRN, Tramaine Dumont MD  •  fentaNYL citrate (PF) (SUBLIMAZE) injection 25 mcg, 25 mcg, Intravenous, Q5 Min PRN, Tramaine Dumont MD  •  lactated ringers bolus 500 mL, 500 mL, Intravenous, Once, Binh Michele MD, Last Rate: 500 mL/hr at 05/11/23 0615, 500 mL at 05/11/23 0615  •  lactated ringers infusion 1,000 mL, 1,000 mL, Intravenous, Continuous, Binh Michele MD, Last Rate: 25 mL/hr at 05/11/23 0652, Currently Infusing at 05/11/23 0652  •  lactated ringers infusion 1,000 mL, 1,000 mL, Intravenous, Continuous, Binh Michele MD, Last Rate: 25 mL/hr at 05/11/23 0652, Currently Infusing at 05/11/23 0652  •  lactated ringers infusion, 9 mL/hr, Intravenous, Continuous, Tramaine Dumont MD  •  lidocaine PF 1% (XYLOCAINE) injection 0.5 mL, 0.5 mL, Intradermal, Once PRN, Binh Michele MD  •  lidocaine PF 1% (XYLOCAINE) injection 0.5 mL, 0.5 mL, Injection, Once PRN, Tramaine Dumont MD  •  scopolamine patch 1 mg/72 hr, 1 patch, Transdermal, Continuous, Binh Michele MD, 1 patch at 05/11/23 0649  •  sodium chloride 0.9 % flush 10 mL, 10 mL, Intravenous, PRN, Binh Michele MD  •  sodium chloride 0.9 % flush 10 mL, 10 mL, Intravenous, Q12H, Tramaine Dumont MD  •  sodium chloride 0.9 % flush 10 mL, 10 mL, Intravenous, PRN, Tramaine Dumont MD  •  sodium chloride 0.9 % flush 3 mL, 3 mL, Intravenous, PRN, Binh Michele MD    Objective     Vital Signs  Temp:  [97.4 °F (36.3 °C)] 97.4 °F (36.3 °C)  Heart Rate:  [80] 80  Resp:  [16] 16  BP:  (124)/(85) 124/85  Body mass index is 54.81 kg/m².      04/25/23  0905   Weight: (!) 154 kg (339 lb 9.6 oz)       General Appearance:  awake, alert, oriented, in no acute distress  Lungs:  Normal expansion.  Clear to auscultation.  No rales, rhonchi, or wheezing.  Heart:  Heart regular rate and rhythm  Abdomen:  Soft, non-tender, normal bowel sounds; no bruits, organomegaly or masses.  Abnormal shape: obese    Results Review:   I reviewed the patient's new clinical results.        Assessment & Plan   Encounter Diagnoses   Name Primary?   • Class 3 severe obesity due to excess calories with serious comorbidity and body mass index (BMI) of 50.0 to 59.9 in adult Yes   • Gastroesophageal reflux disease, unspecified whether esophagitis present    • Sleep apnea, unspecified type        AKD Plan List: I believe this patient will be a good candidate for weight loss surgery.    She has chosen laparoscopic sleeve gastrectomy. I agree with this decision.  I have discussed the Kay - Y Gastric Bypass, laparoscopic sleeve gastrectomy and the Laparoscopic Gastric Band procedures to provide the alternatives which includes non surgical weight loss options as well.  We discussed the benefits of the surgeries including the benefit of weight loss and the possible reversal of co-morbid conditions associated with morbid obesity.  She is aware that the Sleeve procedure is not reversible.  We discussed the complications and risks which include the risk of perforation, leakage,bleeding, intra-abdominal organ injury, specifically at high risks of the liver and spleen, stenosis or ulcerations, the risk of venous thrombosis formation in the lungs, mesentery veins or lower extremities  leading to possible organ injury and death.  I explained to the patient that there is a risk of infection.  I explained to her the possibility that this procedure may not be performed laparoscopically and may require being converted to an opened procedure or  aborted due to abnormal anatomy.  Also postoperatively there is a risk of increased GERD symptoms after this procedure.  I have explained if she develops intestinal metaplasia of her esophagus consideration for conversion to another weight loss procedure may be necessary.    I have explained to the patient that depression, addictive behaviors and even an increase in suicidal emotions can occur after surgery and even though they have undergone a mental health evaluation through psychology/psychiatry or by a registered therapist she may require additional evaluation and treatment in the future.  Nicotine cessation needs to continue even after their surgical procedure and nicotine products should be avoided due to the side effects of these products.  I explained to Allison Mckeon not to plan for pregnancy within 12 to 18 months of her procedure.  I explained to her postoperatively she should avoid having unprotected sex that would increase her risk of pregnancy during the postoperative period of 1 to 1-1/2 years.  I have also explained to the patient that if there is a hiatal hernia defect this will be repaired during her procedure which will be determined intraoperatively.    I have reviewed with the patient her 30-day mortality risk using the ACS Surgical Risk Calculator to be less than 0.1%.    I explained to the patient that the success of the surgery is directly related to the motivation and dedication to behavioral changes that they have learned during their preoperative course.  There is data that shows approximately 10% of patients may have satisfactory weight loss or regain their weight they have lost after surgery.  It is more likely due to returning to the previous behaviors they incorporated during their disease of obesity.  I also encourage Allison Mckeon to incorporate exercise again after surgery weight restrictions have been removed postoperatively.    Serafin Report   As part of this patient's treatment  plan I am prescribing controlled substances.  We review Lanre in our educational course.  The patient has been made aware of appropriate use of such medications, including potential risk of somnolence, limited ability to drive and /or work safely, and potential for dependence or overdose. It has also been made clear that these medications are for use by this patient only, without concomitant use of alcohol or other substances unless prescribed.    Allison Mckeon will be required to complete the educational preoperative class detailing terms of the preoperative and postoperative recommendations, risks of surgery, the monitoring of the patient's LANRE reports if necessary. Allison Mckeon is aware that inappropriate use of prescribed medications will result in cessation of prescribing such medications.    LANRE report has been reviewed.      History and physical exam exhibit continued safe and appropriate use of controlled substances.       Allison Mckeon understands the surgical procedures and the different surgical options that are available.   Allison Mckeon understands the lifestyle changes that are required after surgery and has agreed to follow the guidelines outlined in the weight management program. Allison Mckeon also expressed understanding of the risks involved and had all of her questions answered and desires to proceed.    Upon completion of our discussion and addressing and answering her questions to her satisfaction, informed consent was obtained.   She will be scheduled accordingly for a laparoscopic Sleeve gastrectomy procedure.        I discussed the patient's findings and my recommendations with patient.  I have also recommended that she obtain her preoperative educational course, laboratory work and preoperative diet prior to surgery consideration.    Dr. Binh Michele MD [facility]    05/11/23  06:57 CDT  Patient Care Team:  Wendy Andrade APRN as PCP - General (Nurse Practitioner)

## 2023-05-11 NOTE — ANESTHESIA POSTPROCEDURE EVALUATION
"Patient: Allison Mckeon    Procedure Summary     Date: 05/11/23 Room / Location: [facility] OR  /  [facility] OR    Anesthesia Start: 0701 Anesthesia Stop: 1043    Procedure: GASTRIC SLEEVE LAPAROSCOPIC AND HIATAL HERNIA REPAIR WITH DAVINCI ROBOT (Abdomen) Diagnosis:       Class 3 severe obesity due to excess calories with serious comorbidity and body mass index (BMI) of 50.0 to 59.9 in adult      Gastroesophageal reflux disease, unspecified whether esophagitis present      Sleep apnea, unspecified type      Paraesophageal hernia      (Class 3 severe obesity due to excess calories with serious comorbidity and body mass index (BMI) of 50.0 to 59.9 in adult [E66.01, Z68.43])      (Gastroesophageal reflux disease, unspecified whether esophagitis present [K21.9])      (Sleep apnea, unspecified type [G47.30])    Surgeons: Binh Michele MD Provider: Kei Bradley CRNA    Anesthesia Type: general ASA Status: 3          Anesthesia Type: general    Vitals  Vitals Value Taken Time   /95 05/11/23 1228   Temp 97.1 °F (36.2 °C) 05/11/23 1238   Pulse 80 05/11/23 1239   Resp 14 05/11/23 1228   SpO2 94 % 05/11/23 1239   Vitals shown include unvalidated device data.        Post Anesthesia Care and Evaluation    Patient location during evaluation: PACU  Patient participation: complete - patient participated  Level of consciousness: awake and alert  Pain management: adequate    Airway patency: patent  Anesthetic complications: No anesthetic complications  PONV Status: none  Cardiovascular status: acceptable and hemodynamically stable  Respiratory status: acceptable  Hydration status: acceptable    Comments: Blood pressure 132/86, pulse 80, temperature 99.1 °F (37.3 °C), temperature source Oral, resp. rate 16, height 167.6 cm (65.98\"), weight (!) 150 kg (330 lb 11 oz), SpO2 91 %, not currently breastfeeding.    Patient discharged from PACU based upon Amarjit score. Please see RN notes for further details      "

## 2023-05-11 NOTE — H&P (VIEW-ONLY)
Patient Care Team:  Wendy Andrade APRN as PCP - General (Nurse Practitioner)    Reason for Visit:  Surgical Weight loss    Subjective      Allison Mckeon is a pleasant 42 y.o. female and presents with morbid obesity with her Body mass index is 54.81 kg/m².    She is here for discussion of weight loss options.  She stated she has been with the disease of obesity for year(s).  She stated she suffers from GERD, obstructive sleep apnea and morbid obesity due to her weight gain.  She stated that weight loss helps alleviate these symptoms.   She stated that she has tried multiple diet regimens including completing a medically supervised weight loss program to help with weight loss.  She stated that she has attempted these conservative methods for weight loss without maintaining long term success.  Today she would like to discuss surgical weight loss options such as the Laparoscopic Sleeve Gastrectomy or the Laparoscopic R - Y Gastric Bypass.      Review of Systems  General ROS: positive for  - fatigue and sleep disturbance  Psychological ROS: negative  Respiratory ROS: no cough, shortness of breath, or wheezing  Cardiovascular ROS: no chest pain or dyspnea on exertion  Gastrointestinal ROS: no abdominal pain, change in bowel habits, or black or bloody stools    History  Past Medical History:   Diagnosis Date   • ADHD (attention deficit hyperactivity disorder) 1991   • Allergic 1996    Avocado; Anaphylaxis   • Allergic rhinitis 1995   • Anxiety 1994   • Asthma 2010   • Cholelithiasis 2003   • Colon polyp 2010   • Depression 1996   • Disease of thyroid gland 2012    Hashimotos   • Fibromyalgia, primary 2012   • GERD (gastroesophageal reflux disease)    • Glaucoma 2010    Elevated Pressure   • Headache 1996   • Hernia    • Hypertension    • Obesity    • Personal history of COVID-19 02/2023   • PONV (postoperative nausea and vomiting)    • Sleep apnea     Does Not use CPAP     Past Surgical History:   Procedure  Laterality Date   • ADENOIDECTOMY     •  SECTION     • CHOLECYSTECTOMY     • COLONOSCOPY     • ENDOMETRIAL ABLATION     • ENDOSCOPY N/A 2023    Procedure: ESOPHAGOGASTRODUODENOSCOPY WITH ANESTHESIA;  Surgeon: Binh Michele MD;  Location[facility] ENDOSCOPY;  Service: General;  Laterality: N/A;  pre screen  post esophagitis      • HYSTERECTOMY     • SINUS SURGERY     • SUBTOTAL HYSTERECTOMY     • TONSILLECTOMY     • TUBAL ABDOMINAL LIGATION      Reversed in      Family History   Adopted: Yes   Problem Relation Age of Onset   • Asthma Mother    • Thyroid disease Mother         Hyper   • Depression Father    • Learning disabilities Father         Dyslexia   • Mental illness Father         Clinical Psycopath   • Heart disease Maternal Grandmother    • Lung cancer Maternal Grandmother    • Colon cancer Maternal Grandmother    • Cancer Maternal Grandmother         Colon Cancer (Twice)   • Pancreatic cancer Maternal Grandfather    • Cancer Maternal Grandfather         Pancreas Cancer   • Thyroid disease Maternal Grandfather         Hypo   • Cancer Paternal Grandmother         Colon & Lung Cancer   • Diabetes Paternal Grandmother    • Vision loss Paternal Grandmother    • Early death Paternal Grandfather         Aneurysm < 40 years   • Learning disabilities Son    • Cancer Maternal Aunt         Breast Cancer < 40 years   • Early death Paternal Aunt         Breast Cancer < 40 years     Social History     Tobacco Use   • Smoking status: Never   • Smokeless tobacco: Never   Vaping Use   • Vaping Use: Never used   Substance Use Topics   • Alcohol use: Not Currently     Alcohol/week: 2.0 standard drinks     Types: 2 Glasses of wine per week     Comment: social   • Drug use: Never     E-cigarette/Vaping   • E-cigarette/Vaping Use Never User      E-cigarette/Vaping Substances   • Nicotine No    • THC No    • CBD No    • Flavoring No      (Not in a hospital admission)    Allergies:   Avocado, Prilosec [omeprazole], and Promethazine    No current facility-administered medications for this visit.  No current outpatient medications on file.    Facility-Administered Medications Ordered in Other Visits:   •  ceFAZolin in Sodium Chloride (ANCEF) IVPB solution 3 g, 3 g, Intravenous, Once, Binh Michele MD  •  dextrose (D50W) (25 g/50 mL) IV injection 12.5 g, 12.5 g, Intravenous, PRN, Tramaine Dumont MD  •  fentaNYL citrate (PF) (SUBLIMAZE) injection 25 mcg, 25 mcg, Intravenous, Q5 Min PRN, Tramaine Dumont MD  •  lactated ringers bolus 500 mL, 500 mL, Intravenous, Once, Binh Michele MD, Last Rate: 500 mL/hr at 05/11/23 0615, 500 mL at 05/11/23 0615  •  lactated ringers infusion 1,000 mL, 1,000 mL, Intravenous, Continuous, Binh Michele MD, Last Rate: 25 mL/hr at 05/11/23 0652, Currently Infusing at 05/11/23 0652  •  lactated ringers infusion 1,000 mL, 1,000 mL, Intravenous, Continuous, Binh Michele MD, Last Rate: 25 mL/hr at 05/11/23 0652, Currently Infusing at 05/11/23 0652  •  lactated ringers infusion, 9 mL/hr, Intravenous, Continuous, Tramaine Dumont MD  •  lidocaine PF 1% (XYLOCAINE) injection 0.5 mL, 0.5 mL, Intradermal, Once PRN, Binh Michele MD  •  lidocaine PF 1% (XYLOCAINE) injection 0.5 mL, 0.5 mL, Injection, Once PRN, Tramaine Dumont MD  •  scopolamine patch 1 mg/72 hr, 1 patch, Transdermal, Continuous, Binh Michele MD, 1 patch at 05/11/23 0649  •  sodium chloride 0.9 % flush 10 mL, 10 mL, Intravenous, PRN, Binh Michele MD  •  sodium chloride 0.9 % flush 10 mL, 10 mL, Intravenous, Q12H, Tramaine Dumont MD  •  sodium chloride 0.9 % flush 10 mL, 10 mL, Intravenous, PRN, Tramaine Dumont MD  •  sodium chloride 0.9 % flush 3 mL, 3 mL, Intravenous, PRN, Binh Michele MD    Objective     Vital Signs  Temp:  [97.4 °F (36.3 °C)] 97.4 °F (36.3 °C)  Heart Rate:  [80] 80  Resp:  [16] 16  BP:  (124)/(85) 124/85  Body mass index is 54.81 kg/m².      04/25/23  0905   Weight: (!) 154 kg (339 lb 9.6 oz)       General Appearance:  awake, alert, oriented, in no acute distress  Lungs:  Normal expansion.  Clear to auscultation.  No rales, rhonchi, or wheezing.  Heart:  Heart regular rate and rhythm  Abdomen:  Soft, non-tender, normal bowel sounds; no bruits, organomegaly or masses.  Abnormal shape: obese    Results Review:   I reviewed the patient's new clinical results.        Assessment & Plan   Encounter Diagnoses   Name Primary?   • Class 3 severe obesity due to excess calories with serious comorbidity and body mass index (BMI) of 50.0 to 59.9 in adult Yes   • Gastroesophageal reflux disease, unspecified whether esophagitis present    • Sleep apnea, unspecified type        AKD Plan List: I believe this patient will be a good candidate for weight loss surgery.    She has chosen laparoscopic sleeve gastrectomy. I agree with this decision.  I have discussed the Kay - Y Gastric Bypass, laparoscopic sleeve gastrectomy and the Laparoscopic Gastric Band procedures to provide the alternatives which includes non surgical weight loss options as well.  We discussed the benefits of the surgeries including the benefit of weight loss and the possible reversal of co-morbid conditions associated with morbid obesity.  She is aware that the Sleeve procedure is not reversible.  We discussed the complications and risks which include the risk of perforation, leakage,bleeding, intra-abdominal organ injury, specifically at high risks of the liver and spleen, stenosis or ulcerations, the risk of venous thrombosis formation in the lungs, mesentery veins or lower extremities  leading to possible organ injury and death.  I explained to the patient that there is a risk of infection.  I explained to her the possibility that this procedure may not be performed laparoscopically and may require being converted to an opened procedure or  aborted due to abnormal anatomy.  Also postoperatively there is a risk of increased GERD symptoms after this procedure.  I have explained if she develops intestinal metaplasia of her esophagus consideration for conversion to another weight loss procedure may be necessary.    I have explained to the patient that depression, addictive behaviors and even an increase in suicidal emotions can occur after surgery and even though they have undergone a mental health evaluation through psychology/psychiatry or by a registered therapist she may require additional evaluation and treatment in the future.  Nicotine cessation needs to continue even after their surgical procedure and nicotine products should be avoided due to the side effects of these products.  I explained to Allison Mckeon not to plan for pregnancy within 12 to 18 months of her procedure.  I explained to her postoperatively she should avoid having unprotected sex that would increase her risk of pregnancy during the postoperative period of 1 to 1-1/2 years.  I have also explained to the patient that if there is a hiatal hernia defect this will be repaired during her procedure which will be determined intraoperatively.    I have reviewed with the patient her 30-day mortality risk using the ACS Surgical Risk Calculator to be less than 0.1%.    I explained to the patient that the success of the surgery is directly related to the motivation and dedication to behavioral changes that they have learned during their preoperative course.  There is data that shows approximately 10% of patients may have satisfactory weight loss or regain their weight they have lost after surgery.  It is more likely due to returning to the previous behaviors they incorporated during their disease of obesity.  I also encourage Allison Mckeon to incorporate exercise again after surgery weight restrictions have been removed postoperatively.    Serafin Report   As part of this patient's treatment  plan I am prescribing controlled substances.  We review Lanre in our educational course.  The patient has been made aware of appropriate use of such medications, including potential risk of somnolence, limited ability to drive and /or work safely, and potential for dependence or overdose. It has also been made clear that these medications are for use by this patient only, without concomitant use of alcohol or other substances unless prescribed.    Allison Mckeon will be required to complete the educational preoperative class detailing terms of the preoperative and postoperative recommendations, risks of surgery, the monitoring of the patient's LANRE reports if necessary. Allison Mckeon is aware that inappropriate use of prescribed medications will result in cessation of prescribing such medications.    LANRE report has been reviewed.      History and physical exam exhibit continued safe and appropriate use of controlled substances.       Allison Mckeon understands the surgical procedures and the different surgical options that are available.   Allison Mckeon understands the lifestyle changes that are required after surgery and has agreed to follow the guidelines outlined in the weight management program. Allison Mckeon also expressed understanding of the risks involved and had all of her questions answered and desires to proceed.    Upon completion of our discussion and addressing and answering her questions to her satisfaction, informed consent was obtained.   She will be scheduled accordingly for a laparoscopic Sleeve gastrectomy procedure.        I discussed the patient's findings and my recommendations with patient.  I have also recommended that she obtain her preoperative educational course, laboratory work and preoperative diet prior to surgery consideration.    Dr. Binh Michele MD [facility]    05/11/23  06:57 CDT  Patient Care Team:  Wendy Andrade APRN as PCP - General (Nurse Practitioner)

## 2023-05-11 NOTE — ANESTHESIA PREPROCEDURE EVALUATION
Anesthesia Evaluation     Patient summary reviewed   history of anesthetic complications: PONV  NPO Solid Status: > 8 hours             Airway   Mallampati: II  Dental      Pulmonary    (+) sleep apnea,   (-) COPD, asthma, not a smoker  Cardiovascular   Exercise tolerance: excellent (>7 METS)    (+) hypertension,   (-) pacemaker, past MI, angina, cardiac stents      Neuro/Psych- negative ROS  (-) seizures, TIA, CVA  GI/Hepatic/Renal/Endo    (+) morbid obesity,    (-) GERD, liver disease, no renal disease, diabetes    Musculoskeletal     Abdominal    Substance History      OB/GYN          Other                          Anesthesia Plan    ASA 3     general     intravenous induction     Anesthetic plan, risks, benefits, and alternatives have been provided, discussed and informed consent has been obtained with: patient.        CODE STATUS:

## 2023-05-12 ENCOUNTER — READMISSION MANAGEMENT (OUTPATIENT)
Dept: CALL CENTER | Facility: HOSPITAL | Age: 43
End: 2023-05-12
Payer: COMMERCIAL

## 2023-05-12 VITALS
DIASTOLIC BLOOD PRESSURE: 66 MMHG | SYSTOLIC BLOOD PRESSURE: 116 MMHG | HEART RATE: 63 BPM | TEMPERATURE: 97.9 F | HEIGHT: 66 IN | BODY MASS INDEX: 47.09 KG/M2 | RESPIRATION RATE: 16 BRPM | OXYGEN SATURATION: 93 % | WEIGHT: 293 LBS

## 2023-05-12 PROBLEM — Z98.890 HISTORY OF REPAIR OF HIATAL HERNIA: Status: ACTIVE | Noted: 2023-05-12

## 2023-05-12 PROBLEM — Z98.84 STATUS POST LAPAROSCOPIC SLEEVE GASTRECTOMY: Status: ACTIVE | Noted: 2023-05-11

## 2023-05-12 PROBLEM — Z87.19 HISTORY OF REPAIR OF HIATAL HERNIA: Status: ACTIVE | Noted: 2023-05-12

## 2023-05-12 LAB
CYTO UR: NORMAL
GLUCOSE BLDC GLUCOMTR-MCNC: 102 MG/DL (ref 70–130)
GLUCOSE BLDC GLUCOMTR-MCNC: 115 MG/DL (ref 70–130)
HCT VFR BLD AUTO: 39.6 % (ref 34–46.6)
HGB BLD-MCNC: 12.5 G/DL (ref 12–15.9)
LAB AP CASE REPORT: NORMAL
Lab: NORMAL
PATH REPORT.FINAL DX SPEC: NORMAL
PATH REPORT.GROSS SPEC: NORMAL

## 2023-05-12 PROCEDURE — 25010000002 METOCLOPRAMIDE PER 10 MG: Performed by: SURGERY

## 2023-05-12 PROCEDURE — 0 HYDROMORPHONE 1 MG/ML SOLUTION: Performed by: SURGERY

## 2023-05-12 PROCEDURE — 25010000002 ENOXAPARIN PER 10 MG: Performed by: SURGERY

## 2023-05-12 PROCEDURE — 82948 REAGENT STRIP/BLOOD GLUCOSE: CPT

## 2023-05-12 PROCEDURE — 25010000002 KETOROLAC TROMETHAMINE PER 15 MG: Performed by: SURGERY

## 2023-05-12 PROCEDURE — 25010000002 ONDANSETRON PER 1 MG: Performed by: SURGERY

## 2023-05-12 PROCEDURE — 85014 HEMATOCRIT: CPT | Performed by: SURGERY

## 2023-05-12 PROCEDURE — 85018 HEMOGLOBIN: CPT | Performed by: SURGERY

## 2023-05-12 RX ORDER — ENOXAPARIN SODIUM 100 MG/ML
30 INJECTION SUBCUTANEOUS
Qty: 1.5 ML | Refills: 0 | Status: SHIPPED | OUTPATIENT
Start: 2023-05-12 | End: 2023-05-17

## 2023-05-12 RX ORDER — ONDANSETRON 4 MG/1
4 TABLET, ORALLY DISINTEGRATING ORAL EVERY 8 HOURS PRN
Qty: 30 TABLET | Refills: 1 | Status: SHIPPED | OUTPATIENT
Start: 2023-05-12

## 2023-05-12 RX ORDER — SIMETHICONE 80 MG
40 TABLET,CHEWABLE ORAL EVERY 6 HOURS PRN
Qty: 30 TABLET | Refills: 1 | Status: SHIPPED | OUTPATIENT
Start: 2023-05-12

## 2023-05-12 RX ORDER — PANTOPRAZOLE SODIUM 40 MG/10ML
40 INJECTION, POWDER, LYOPHILIZED, FOR SOLUTION INTRAVENOUS ONCE
Status: CANCELLED | OUTPATIENT
Start: 2023-05-12 | End: 2023-05-12

## 2023-05-12 RX ORDER — METOCLOPRAMIDE HYDROCHLORIDE 5 MG/ML
5 INJECTION INTRAMUSCULAR; INTRAVENOUS EVERY 6 HOURS
Status: CANCELLED | OUTPATIENT
Start: 2023-05-12

## 2023-05-12 RX ORDER — ONDANSETRON 2 MG/ML
4 INJECTION INTRAMUSCULAR; INTRAVENOUS EVERY 4 HOURS
Status: CANCELLED | OUTPATIENT
Start: 2023-05-12

## 2023-05-12 RX ORDER — PANTOPRAZOLE SODIUM 40 MG/1
40 TABLET, DELAYED RELEASE ORAL DAILY
Qty: 30 TABLET | Refills: 1 | Status: SHIPPED | OUTPATIENT
Start: 2023-05-12

## 2023-05-12 RX ORDER — TRAMADOL HYDROCHLORIDE 50 MG/1
50 TABLET ORAL EVERY 8 HOURS PRN
Qty: 30 TABLET | Refills: 0 | Status: SHIPPED | OUTPATIENT
Start: 2023-05-12

## 2023-05-12 RX ADMIN — SODIUM CHLORIDE, POTASSIUM CHLORIDE, SODIUM LACTATE AND CALCIUM CHLORIDE 140 ML/HR: 600; 310; 30; 20 INJECTION, SOLUTION INTRAVENOUS at 01:33

## 2023-05-12 RX ADMIN — ACETAMINOPHEN 325 MG: 325 SUSPENSION ORAL at 01:30

## 2023-05-12 RX ADMIN — METOCLOPRAMIDE HYDROCHLORIDE 5 MG: 5 INJECTION INTRAMUSCULAR; INTRAVENOUS at 13:22

## 2023-05-12 RX ADMIN — CARVEDILOL 12.5 MG: 6.25 TABLET, FILM COATED ORAL at 08:28

## 2023-05-12 RX ADMIN — ACETAMINOPHEN 325 MG: 325 SUSPENSION ORAL at 13:21

## 2023-05-12 RX ADMIN — METOCLOPRAMIDE HYDROCHLORIDE 5 MG: 5 INJECTION INTRAMUSCULAR; INTRAVENOUS at 01:30

## 2023-05-12 RX ADMIN — ENOXAPARIN SODIUM 40 MG: 100 INJECTION SUBCUTANEOUS at 08:28

## 2023-05-12 RX ADMIN — TRAMADOL HYDROCHLORIDE 50 MG: 50 TABLET, COATED ORAL at 05:04

## 2023-05-12 RX ADMIN — KETOROLAC TROMETHAMINE 30 MG: 30 INJECTION, SOLUTION INTRAMUSCULAR; INTRAVENOUS at 08:27

## 2023-05-12 RX ADMIN — TRAMADOL HYDROCHLORIDE 50 MG: 50 TABLET, COATED ORAL at 13:21

## 2023-05-12 RX ADMIN — HYDROMORPHONE HYDROCHLORIDE 0.5 MG: 1 INJECTION, SOLUTION INTRAMUSCULAR; INTRAVENOUS; SUBCUTANEOUS at 01:31

## 2023-05-12 RX ADMIN — ONDANSETRON 4 MG: 2 INJECTION INTRAMUSCULAR; INTRAVENOUS at 10:49

## 2023-05-12 RX ADMIN — METOCLOPRAMIDE HYDROCHLORIDE 5 MG: 5 INJECTION INTRAMUSCULAR; INTRAVENOUS at 08:27

## 2023-05-12 RX ADMIN — SODIUM CHLORIDE, POTASSIUM CHLORIDE, SODIUM LACTATE AND CALCIUM CHLORIDE 140 ML/HR: 600; 310; 30; 20 INJECTION, SOLUTION INTRAVENOUS at 05:04

## 2023-05-12 RX ADMIN — FAMOTIDINE 20 MG: 10 INJECTION INTRAVENOUS at 10:49

## 2023-05-12 RX ADMIN — ONDANSETRON 4 MG: 2 INJECTION INTRAMUSCULAR; INTRAVENOUS at 05:04

## 2023-05-12 RX ADMIN — ACETAMINOPHEN 325 MG: 325 SUSPENSION ORAL at 08:27

## 2023-05-12 RX ADMIN — SERTRALINE HYDROCHLORIDE 50 MG: 50 TABLET, FILM COATED ORAL at 08:28

## 2023-05-12 RX ADMIN — Medication 10 ML: at 08:29

## 2023-05-12 NOTE — DISCHARGE SUMMARY
"  Date of Discharge:  5/12/2023    Discharge Diagnosis: Class 3 severe obesity due to excess calories with serious comorbidity and body mass index (BMI) of 50.0 to 59.9 in adult [E66.01, Z68.43]  Gastroesophageal reflux disease, unspecified whether esophagitis present [K21.9]  Sleep apnea, unspecified type [G47.30]    Presenting Problem/History of Present Illness  Class 3 severe obesity due to excess calories with serious comorbidity and body mass index (BMI) of 50.0 to 59.9 in adult [E66.01, Z68.43]  Gastroesophageal reflux disease, unspecified whether esophagitis present [K21.9]  Sleep apnea, unspecified type [G47.30]       Hospital Course  Patient is a 42 y.o. female presented with ***.      Procedures Performed  Procedure(s):  GASTRIC SLEEVE LAPAROSCOPIC AND HIATAL HERNIA REPAIR WITH DAVINCI ROBOT       Consults:   Consults     No orders found for last 30 day(s).            Condition on Discharge:  Stable    Vital Signs  /66 (BP Location: Left arm, Patient Position: Lying)   Pulse 63   Temp 97.9 °F (36.6 °C) (Oral)   Resp 16   Ht 167.6 cm (65.98\") Comment: waist size 53 inches  Wt (!) 150 kg (330 lb 11 oz)   SpO2 93%   Breastfeeding No   BMI 53.40 kg/m²     Physical Exam:  General Appearance: alert, appears stated age and cooperative  Lungs: clear to auscultation, respirations regular, respirations even and respirations unlabored  Heart: regular rhythm & normal rate, normal S1, S2, no murmur, no gallop, no rub and no click  Abdomen: normal bowel sounds, no masses, no hepatomegaly, no splenomegaly, no guarding and no rebound tenderness, tender  RUQ  Extremities: moves extremities well, no edema, no cyanosis and no redness    Discharge Disposition  Home or Self Care    Discharge Medications     Discharge Medications      New Medications      Instructions Start Date   Enoxaparin Sodium 30 MG/0.3ML solution prefilled syringe syringe  Commonly known as: LOVENOX   30 mg, Subcutaneous, Every 24 Hours " Scheduled, Start tomorrow in the a.m.      ondansetron ODT 4 MG disintegrating tablet  Commonly known as: ZOFRAN-ODT   4 mg, Translingual, Every 8 Hours PRN      pantoprazole 40 MG EC tablet  Commonly known as: PROTONIX   40 mg, Oral, Daily      simethicone 80 MG chewable tablet  Commonly known as: Gas-X   40 mg, Oral, Every 6 Hours PRN      traMADol 50 MG tablet  Commonly known as: Ultram   50 mg, Oral, Every 8 Hours PRN         Changes to Medications      Instructions Start Date   sertraline 100 MG tablet  Commonly known as: ZOLOFT  What changed: Another medication with the same name was changed. Make sure you understand how and when to take each.   50 mg, Oral, Daily      sertraline 20 MG/ML concentrated solution  Commonly known as: ZOLOFT  What changed: additional instructions   100 mg, Oral, Daily         Continue These Medications      Instructions Start Date   BARIATRIC MULTIVITAMINS/IRON PO   1 tablet, Oral, Daily      busPIRone 7.5 MG tablet  Commonly known as: BUSPAR   7.5 mg, Oral, 3 Times Daily PRN      CALCIUM CITRATE CHEWY BITE PO   1 dose, Oral, 3 Times Daily      carvedilol 12.5 MG tablet  Commonly known as: Coreg   12.5 mg, Oral, 2 Times Daily With Meals      FloraVantage capsule   1 capsule, Oral, Daily, CHEWABLE      losartan 100 MG tablet  Commonly known as: COZAAR   100 mg, Oral, Daily      montelukast 10 MG tablet  Commonly known as: SINGULAIR   10 mg, Oral, Nightly         Stop These Medications    ibuprofen 800 MG tablet  Commonly known as: ADVIL,MOTRIN        ASK your doctor about these medications      Instructions Start Date   ciclopirox 8 % solution  Commonly known as: PENLAC   Topical, Nightly      clotrimazole-betamethasone 1-0.05 % cream  Commonly known as: LOTRISONE   1 application, 2 Times Daily      cyanocobalamin 1000 MCG/ML injection   1,000 mcg, Intramuscular, Weekly      methylphenidate 36 MG CR tablet   36 mg, Daily      vitamin D 1.25 MG ([id number] UT) capsule capsule  Commonly  known as: ERGOCALCIFEROL   50,000 Units, Oral, Weekly             Discharge Diet:     Activity at Discharge:     Follow-up Appointments  Future Appointments   Date Time Provider Department Center   5/17/2023  1:30 PM Binh Michele MD [facility] None   6/12/2023 10:30 AM Binh Michele MD [facility] None   7/25/2023 10:00 AM Wendy Andrade, EZIO [facility]   8/8/2023 10:30 AM Binh Michele MD [facility] None   9/14/2023  2:00 PM Wendy Andrade, EZIO [facility]   11/7/2023 10:30 AM Manda Benton APRN [facility] None   11/7/2023 11:00 AM Yasmeen Veloz RD [facility] None   5/13/2024 10:30 AM Binh Michele MD [facility] None         Test Results Pending at Discharge       Binh Michele MD  05/12/23  15:04 CDT

## 2023-05-12 NOTE — PLAN OF CARE
Goal Outcome Evaluation:  Plan of Care Reviewed With: patient        Progress: improving  Outcome Evaluation: Lap x 6 with dermabond. Binder in place. SCD's in place. Up with standby assist. PRN pain meds given as ordered. Voiding without difficulty. Will begin stage 1 diet this am. Call light in reach, safety maintained, VSS.

## 2023-05-12 NOTE — OUTREACH NOTE
Prep Survey    Flowsheet Row Responses   [facility] facility patient discharged from? [place]   Is LACE score < 7 ? Yes   Eligibility [facility]   Date of Admission 05/11/23   Date of Discharge 05/12/23   Discharge Disposition Home or Self Care   Discharge diagnosis GASTRIC SLEEVE LAPAROSCOPIC   Does the patient have one of the following disease processes/diagnoses(primary or secondary)? General Surgery   Does the patient have Home health ordered? No   Is there a DME ordered? No   Prep survey completed? Yes          JOHN HOBBS - Registered Nurse

## 2023-05-12 NOTE — PAYOR COMM NOTE
"Jewel Tejeda (42 y.o. Female) [id number]  Admit 5/11   Plan d/c today 5/12   [facility] phone    Fax        Date of Birth   1980    Social Security Number       Address   [address] [place]  [place] [address]    Home Phone   [phone]    MRN   [id number]       Orthodoxy   Other    Marital Status                               Admission Date   5/11/23    Admission Type   Elective    Admitting Provider   Binh Michele MD    Attending Provider   Binh Michele MD    Department, Room/Bed   [facility] 3C, 377/1       Discharge Date       Discharge Disposition   Home or Self Care    Discharge Destination                               Attending Provider: Binh Michele MD    Allergies: Avocado, Prilosec [Omeprazole], Promethazine    Isolation: None   Infection: None   Code Status: Prior    Ht: 167.6 cm (65.98\")   Wt: 150 kg (330 lb 11 oz)    Admission Cmt: None   Principal Problem: Class 3 severe obesity due to excess calories with serious comorbidity and body mass index (BMI) of 50.0 to 59.9 in adult [E66.01,Z68.43]                 Active Insurance as of 5/11/2023     Primary Coverage     Payor Plan Insurance Group Employer/Plan Group    [facility] PPO [id number]     Payor Plan Address Payor Plan Phone Number Payor Plan Fax Number Effective Dates    [address] [phone]  1/1/2022 - None Entered    [place] [address]       Subscriber Name Subscriber Birth Date Member ID       JEWEL TEJEDA 1980 [id number]                 Emergency Contacts      (Rel.) Home Phone Work Phone Mobile Phone    mali tejeda (Spouse) -- -- [phone]              Current Facility-Administered Medications   Medication Dose Route Frequency Provider Last Rate Last Admin   • acetaminophen (TYLENOL) 160 MG/5ML solution 325 mg  325 mg Oral Q6H Binh Michele MD   325 mg at 05/12/23 0827   • " carvedilol (COREG) tablet 12.5 mg  12.5 mg Oral BID With Meals Binh Michele MD   12.5 mg at 05/12/23 0828   • dexamethasone (DECADRON) injection 4 mg  4 mg Intravenous Q8H PRN Binh Michele MD   4 mg at 05/11/23 2106   • diphenhydrAMINE (BENADRYL) injection 25 mg  25 mg Intravenous Q6H PRN Binh Michele MD   25 mg at 05/11/23 2106   • Enoxaparin Sodium (LOVENOX) syringe 40 mg  40 mg Subcutaneous Daily Binh Michele MD   40 mg at 05/12/23 0828   • famotidine (PEPCID) injection 20 mg  20 mg Intravenous Q12H Binh Michele MD   20 mg at 05/12/23 1049   • HYDROmorphone (DILAUDID) injection 0.5 mg  0.5 mg Intravenous Q2H PRN Binh Michele MD   0.5 mg at 05/12/23 0131    And   • naloxone (NARCAN) injection 0.1 mg  0.1 mg Intravenous Q5 Min PRN Binh Michele MD       • labetalol (NORMODYNE,TRANDATE) injection 10 mg  10 mg Intravenous Q30 Min PRN Binh Michele MD       • losartan (COZAAR) tablet 100 mg  100 mg Oral Daily Binh Michele MD       • metoclopramide (REGLAN) injection 5 mg  5 mg Intravenous Q6H Binh Michele MD   5 mg at 05/12/23 0827   • ondansetron (ZOFRAN) injection 4 mg  4 mg Intravenous Q6H Binh Michele MD   4 mg at 05/12/23 1049   • sertraline (ZOLOFT) tablet 50 mg  50 mg Oral Daily Binh Michele MD   50 mg at 05/12/23 0828   • simethicone (MYLICON) chewable tablet 40 mg  40 mg Oral 4x Daily PRN Binh Michele MD       • sodium chloride 0.9 % flush 1-10 mL  1-10 mL Intravenous PRN Binh Michele MD       • sodium chloride 0.9 % flush 10 mL  10 mL Intravenous Q12H Binh Michele MD   10 mL at 05/12/23 0829   • sodium chloride 0.9 % infusion 40 mL  40 mL Intravenous PRN Binh Michele MD       • thiamine (B-1) 100 mg, folic acid 1 mg in sodium chloride 0.9 % 1,000 mL infusion  100 mL/hr Intravenous Once Binh Michele MD       • traMADol (ULTRAM) tablet 50 mg  50 mg Oral Q6H PRN Binh Michele MD   50 mg at 05/12/23 0503         Operative/Procedure Notes (last 48 hours)      Binh Michele MD at 05/11/23 0723          GASTRIC SLEEVE LAPAROSCOPIC WITH DAVINCI ROBOT  Progress Note    Allison Mckeon  5/11/2023    Pre-op Diagnosis:   Class 3 severe obesity due to excess calories with serious comorbidity and body mass index (BMI) of 50.0 to 59.9 in adult [E66.01, Z68.43]  Gastroesophageal reflux disease, unspecified whether esophagitis present [K21.9]  Sleep apnea, unspecified type [G47.30]       Post-Op Diagnosis Codes:     * Class 3 severe obesity due to excess calories with serious comorbidity and body mass index (BMI) of 50.0 to 59.9 in adult [E66.01, Z68.43]     * Gastroesophageal reflux disease, unspecified whether esophagitis present [K21.9]     * Sleep apnea, unspecified type [G47.30]     * Paraesophageal hernia [K44.9]    Procedure/CPT® Codes:        Procedure(s):  GASTRIC SLEEVE LAPAROSCOPIC AND HIATAL HERNIA REPAIR WITH DAVINCI ROBOT        Surgeon(s):  Binh Michele MD    Anesthesia: General    Staff:   Circulator: Maryellen Hoffman RN; Dalton Tanner RN  Scrub Person: Haley Hernandez; Rose Abreu         Estimated Blood Loss: minimal    Urine Voided: * No values recorded between 5/11/2023  7:00 AM and 5/11/2023  9:57 AM *    Specimens:                Specimens     ID Source Type Tests Collected By Collected At Frozen?    A Gastric, Fundus Tissue · TISSUE PATHOLOGY EXAM   Binh Michele MD 5/11/23 0731 No            Findings: Paraesophageal hernia type I        Complications: None          Binh Michele MD     Date: 5/11/2023  Time: 10:03 CDT        Electronically signed by Binh Michele MD at 05/11/23 1003     Binh Michele MD at 05/11/23 0723          GASTRIC SLEEVE LAPAROSCOPIC WITH DAVINCI ROBOT   Op NOTE    Allison Mckeon  5/11/2023     Pre-op Diagnosis:   Class 3 severe obesity due to excess calories with serious comorbidity and body mass index (BMI) of 50.0 to 59.9 in adult [E66.01,  Z68.43]  Gastroesophageal reflux disease, unspecified whether esophagitis present [K21.9]  Sleep apnea, unspecified type [G47.30]       Post-Op Diagnosis Codes:     * Class 3 severe obesity due to excess calories with serious comorbidity and body mass index (BMI) of 50.0 to 59.9 in adult [E66.01, Z68.43]     * Gastroesophageal reflux disease, unspecified whether esophagitis present [K21.9]     * Sleep apnea, unspecified type [G47.30]     * Paraesophageal hernia [K44.9]      Indications: The patient was admitted to the hospital with history of morbid obesity with a Body mass index is 53.4 kg/m².   she is scheduled for a Laparoscopic Sleeve Gastrectomy with robotic assistance with intraoperative findings of a paraesophageal hernia.       Surgeon: Binh Michele MD     Assistants: Jocelyn    Anesthesia: General endotracheal anesthesia    ASA Class: 3, 4          Procedure Details       After the patient was explained the alternatives, benefits, complications, and risks of the robotic-assisted laparoscopic sleeve gastrectomy an informed consent was provided.  The patient was brought to the OR suite after receiving preoperative antibiotics, medications and anticoagulation.  The patient was placed under general anesthesia.  The patient was then prepped and draped in standard fashion.  We performed a surgical timeout.  An 40 Bruneian bougie was placed into the oropharynx by anesthesia followed by placement to suction.  I then proceeded to locally anesthetize the left upper quadrant site for placement of a Veress needle to insufflate the abdominal cavity to a pressure of 15 mmHg.  This was followed by placement of an 8 mm incision which was followed by placement of an 8 mm trocar into the abdominal cavity with camera assist location left upper quadrant.   This trocar was my AirSeal trocar and it was placed into the abdominal cavity under direct visualization.  The Veress needle was identified and removed safely.  An 8 mm  incision was made in the periumbilical midline location for placement of an 8 mm trocar under direct visualization, location approximately 28 cm from the sternal notch.  An 12 mm trocar was placed on the patient's right upper quadrant lateral to the periumbilical trocar under direct visualization as well as an 8 mm trocar placed in the left upper quadrant's lateral site along the same plane under direct visualization. This was then followed by placement of an 8 mm trocar in the left quadrant lateral to the site additionally.   A 5 mm incision was placed in the subxiphoid location for placement of a 5 mm trocar under direct visualization.  I removed this trocar and replaced it with a Yissel liver retractor.  There was fat on the anterior portion of the stomach which was dimpled into the hiatus.  Further retraction of the fat revealed a paraesophageal hernia defect.  I then decided to repair the hiatal hernia defect.  This was performed prior to creation of the sleeve.  Once adequate exposure of the hiatus and stomach was obtained, I proceeded to dock the robot to the trocars.  This was assisted by targeting.  My robotic instruments were then placed under direct visualization into the surgical field.  After breaking scrub away from the surgical table I went to the robotic console and began the procedure.   First I proceeded with dissecting the fat pad near the angle of His away from the diaphragm.    The paraesophageal hernia defect appeared to be type I in nature.  There was significant anterior fat on the proximal portion of the stomach which was resected utilizing the vessel sealer energy.  Once mobilization of this fat which was significant was removed to expose the anterior portion of the stomach and left crura muscles I proceeded with creating a window on the medial aspect of the stomach between the gastro hepatic ligament.  Significant fat was identified on the right crura muscle and stomach.  I created a  window in the posterior aspect of the stomach and esophagus from the right side to the left side of the stomach.  I elevated this with blunt dissection to pass a Penrose around the esophagus which housed the bougie.  I then continued to dissect laterally and circumferentially around the esophagus to free the stomach from its hiatal hernia defect and sac.  I utilized the vessel sealer to control hemostasis and transect the hernia sac away from the posterior aspect of the stomach and esophagus.  Care was made to protect the vascular structures and vagus nerve.  There was significant posterior fat which required to be transected away as well to adequately expose the left crural muscle on the opposite side.  Once adequate exposure of the right and left crura muscles were obtained and the stomach was reduced providing adequate length of the esophagus into the abdominal cavity I proceeded with reapproximating the right and left crura muscles.  My first suture was a V-Loc stay suture used to reapproximate the right and left crura muscles.  This was done in a running fashion.  And then I utilized Ethibond suture x2 in an interrupted fashion to reapproximate and secured the right left crura muscles posteriorly.  Once the hiatal hernia was reduced and the crura muscles were repaired by reapproximation I took my attention to creating the sleeve gastrectomy.  I then proceeded with ligating the greater curvature vessels starting at the mid greater curvature working my way distally and proximally where my most distal ligation site was 6 cm from the pylorus.  I continued proximally along the greater curvature ligating the vessels as well as a short gastrics.  This was all accomplished using the vessel sealer.  Once completion of ligation of the vessels was obtained. I dissected away attachments found posteriorly to the stomach.  Care was made to assure no injury to the pancreas. Completion of my dissection was obtained once  visualization of the posterior left crural muscle was seen.  I proceeded with marking the stomach in standard fashion.  Marking points visually which was approximately 1-1/2 cm from the GE junction, point B which is approximately 3 cm from the angularis incisura and finally point C which is approximately 6 cm proximal from the pylorus.  The sleeve gastrectomy was then created.  I proceeded with stapling the stomach in this fashion based off of these points.  I fired my most distal load first and completed the firing of the stapler device to create the sleeve gastrectomy hugging the bougie.  Again my most distal staple line was approximately 6 cm from the pylorus and I continued proximally along the greater curvature and assuring that the width from the angularis incisura was at least 3 cm.  My final proximal stapler was at least 1-2 cm from the GE junction.    I then assessed the staple line to observe for hemostasis.  Once hemostasis was confirmed I then proceeded with my leak test.  I requested insufflation of the bougie into the sleeve utilizing air and submerging the staple line under saline.  I observed for any evidence of bubbles or leakage.  There was no evidence of leakage or bubbles noted and I then proceeded with suctioning out the air from the sleeve.  Once there was no evidence of leakage I then requested that the bougie be placed off suction, given a small puff and removed under direct visualization.    I placed Surgicel SNoW on the staple line under direct visualization.    I then pexied the omentum to the proximal portion of the sleeve with 2-0 absorbable suture as well as an additional imbricating suture on the midportion of the sleeve with omentum.  I reassessed for hemostasis which was confirmed under direct visualization and then proceeded to scrub back into the case for removal of the resected portion of the stomach after the robotic instruments were removed under direct visualization.  The  resected portion of the stomach was removed from the 12 mm trocar site under direct visualization.  The transected fat that was around the proximal portion of the stomach was removed by placement of an Endo cinch bag to collect the fatty tissue.  This was removed under direct visualization through the 12 mm trocar site.  The 12 mm trocar site's fascia was reapproximated using a Zaid-Will device and an 0 Vicryl suture.   The liver retractor was removed under direct visualization as well as the pneumoperitoneum and remaining trocars.  Then re-locally anesthetized skin wounds for closure.  Skin wounds were closed with 4-0 Monocryl.  Prior to closing skin wounds all lap pads and attachments were accounted for at the end of the procedure.      White staples: 5  Blue staples: 1    Findings: Paraesophageal hernia/hiatal hernia defect    Estimated Blood Loss:  minimal                    Total IV Fluids: 800 mL           Specimens:   Specimens     ID Source Type Tests Collected By Collected At Frozen?    A Gastric, Fundus Tissue · TISSUE PATHOLOGY EXAM   Binh Michele MD 5/11/23 0731 No                 Implants:   Implant Name Type Inv. Item Serial No.  Lot No. LRB No. Used Action   HEMOST ABS SURGICEL SNOW 4X4IN - [id number] Implant HEMOST ABS SURGICEL SNOW 4X4IN  [employer]  DIV [employer] J [employer] J [id number] N/A 1 Implanted   RELOAD STPLR SUREFORM 60 DAVINCI/X/XI 6ROW 2.5 WHT 1P/U - [id number] Implant RELOAD STPLR SUREFORM 60 DAVINCI/X/XI 6ROW 2.5 WHT 1P/U  INTUITIVE SURGICAL [id number] N/A 5 Implanted   RELOAD STPLR SUREFORM 60 DAVINCI/X/XI 6ROW 3.5 JAGUAR 1P/U - [id number] Implant RELOAD STPLR SUREFORM 60 DAVINCI/X/XI 6ROW 3.5 JAGUAR 1P/U  INTUITIVE SURGICAL [id number] N/A 1 Implanted   DEV CLS WND VLOC/90 CHRISTEN ABS 1/2CIR SZ3/0 26MM 23CM LUIS A - [id number] Implant DEV CLS WND VLOC/90 CHRISTEN ABS 1/2CIR SZ3/0 26MM 23CM LUIS A  COVIDIEN [id number] N/A 1 Implanted              Complications: None           Disposition: PACU -  hemodynamically stable.           Condition: stable    Electronically signed by Binh Michele MD at 05/11/23 1014        5/11  PACU admit to 377. Gastric Sleeve with Hernia repair, lap site x6, CDI and ABD binder in use. NPO-ice chips/sips with meds. C/o ABD pain, PRN and scheduled meds given as ordered. Assist x stand-by to ambulate-ambulated in hallway <4 hrs post-op, tolerated well. Mother at bedside assisting with patient care. On room air and , voiding w/o difficulty, IVF and IV ABX given. Lovenox and SCD's for VTE prevention. Call light in reach, safety maintained and continue to monitor.           Discharge Order (From admission, onward)     Start     Ordered    05/12/23 1235  Discharge patient  Once        Expected Discharge Date: 05/12/23    Expected Discharge Time: Afternoon    Discharge Disposition: Home or Self Care    Physician of Record for Attribution - Please select from Treatment Team: BINH MICHELE [[id number]]    Review needed by CMO to determine Physician of Record: No    Please choose which facility the patient is currently admitted if they are being discharged to another facility or unit.: [facility]    Mode: Family       Question Answer Comment   Physician of Record for Attribution - Please select from Treatment Team BINH MICHELE    Review needed by CMO to determine Physician of Record No    Please choose which facility the patient is currently admitted if they are being discharged to another facility or unit. [facility]    Mode: Family        05/12/23 1239                Lap x 6 with dermabond. Binder in place. SCD's in place. Up with standby assist. PRN pain meds given as ordered. Voiding without difficulty. Will begin stage 1 diet this am. Call light in reach, safety maintained, VSS.

## 2023-05-15 ENCOUNTER — TRANSITIONAL CARE MANAGEMENT TELEPHONE ENCOUNTER (OUTPATIENT)
Dept: CALL CENTER | Facility: HOSPITAL | Age: 43
End: 2023-05-15
Payer: COMMERCIAL

## 2023-05-15 ENCOUNTER — TELEPHONE (OUTPATIENT)
Dept: BARIATRICS/WEIGHT MGMT | Facility: CLINIC | Age: 43
End: 2023-05-15
Payer: COMMERCIAL

## 2023-05-15 NOTE — TELEPHONE ENCOUNTER
Ok, continue to do so and she can try another enema id she is uncomfortable. Continue to monitor symptoms for impaction, etc. Add apple juice as well.

## 2023-05-15 NOTE — TELEPHONE ENCOUNTER
She likely vagale down which caused those symptoms. She can try and be sure to breath instead of holding her breath. I would recommend miralax in sugar free gatorade, apple juice, then prune juice when she starts full liquids and to take her stool softener daily.

## 2023-05-15 NOTE — TELEPHONE ENCOUNTER
Spoke with pt and she has started miralax with warm prune juice today since she called earlier. She is also doing stool softener 2 tabs a day since Friday.

## 2023-05-15 NOTE — TELEPHONE ENCOUNTER
Pt is having trouble having a BM, she has tried Colace, milk of mag and also an enema. She states it feels like a hard rock is stuck and everything is going around it. She sat for a long time and began to shake and feel dizzy. She has stopped her pain pill and nausea medicine in the meantime. Scared to try again due to that, she wanting to know what else she may do or help get this to pass. Please advise

## 2023-05-15 NOTE — OUTREACH NOTE
"Call Center TCM Note    Flowsheet Row Responses   [facility] [place] patient discharged from? [facility]   Does the patient have one of the following disease processes/diagnoses(primary or secondary)? General Surgery   TCM attempt successful? Yes  [No VR for PCP]   Call start time 1138   Call end time 1201   Discharge diagnosis GASTRIC SLEEVE LAPAROSCOPIC   Meds reviewed with patient/caregiver? Yes   Is the patient having any side effects they believe may be caused by any medication additions or changes? Yes   Side effects comments  constipation    Does the patient have all medications related to this admission filled (includes all antibiotics, pain medications, etc.) Yes   Is the patient taking all medications as directed (includes completed medication regime)? Yes   Medication comments Pt is no longer using pain med r/t constipation   Comments Pt eligible for TCM fu appt.    Does the patient have an appointment with their PCP within 7 days of discharge? No   Nursing Interventions Patient desires to follow up with specialty only   Psychosocial issues? No   Comments Pt reports constipation, abd cramping.She reports she has tried MOM, enema & colace with no result. She c/o \"feeling it at opening\", suggested warm compress to perineum, using a lubricant around anal opening, she has a potty squatty that she will use. She has not had coffee & reports that typically she has coffee in the morning then BM within 1-2hrs after. We discussed having coffee, she is only taking in 1oz /q 15min.Lap sites x6 with adhesive w/o signs of infection. Denies incisional pain. Walking freq.    Did the patient receive a copy of their discharge instructions? Yes   Nursing interventions Reviewed instructions with patient   What is the patient's perception of their health status since discharge? Improving   Nursing interventions Nurse provided patient education   Is the patient /caregiver able to teach back basic post-op care? Practice 'cough and " deep breath', Keep incision areas clean,dry and protected, Do not remove steri-strips, Lifting as instructed by MD in discharge instructions, No tub bath, swimming, or hot tub until instructed by MD, Take showers only when approved by MD-sponge bathe until then   Is the patient/caregiver able to teach back signs and symptoms of incisional infection? Increased redness, swelling or pain at the incisonal site, Incisional warmth   Is the patient/caregiver able to teach back steps to recovery at home? Set small, achievable goals for return to baseline health, Rest and rebuild strength, gradually increase activity   Is the patient/caregiver able to teach back the hierarchy of who to call/visit for symptoms/problems? PCP, Specialist, Home health nurse, Urgent Care, ED, 911 Yes   Additional teach back comments We discussed use of Miralax as she reports uncertain how she would use if only taking in 1oz every 15 min. Suggested she mix the miralax as directed but can use that as her 1oz every 15min until gone.    TCM call completed? Yes   Call end time 1201          Renetta Dunaway RN    5/15/2023, 12:03 CDT

## 2023-05-15 NOTE — PAYOR COMM NOTE
"REF:  [id number]    [facility]  [place],   [phone]  OR  FAX   [phone]      Jewel Mckeon (42 y.o. Female)     Date of Birth   1980    Social Security Number       Address   [facility] [address] [place] [address]    Home Phone   [phone]    MRN   [id number]       Scientology   Other    Marital Status                               Admission Date   5/11/23    Admission Type   Elective    Admitting Provider   Binh Michele MD    Attending Provider       Department, Room/Bed   [facility] 3C, 377/1       Discharge Date   5/12/2023    Discharge Disposition   Home or Self Care    Discharge Destination                               Attending Provider: (none)   Allergies: Avocado, Prilosec [Omeprazole], Promethazine    Isolation: None   Infection: None   Code Status: Prior    Ht: 167.6 cm (65.98\")   Wt: 150 kg (330 lb 11 oz)    Admission Cmt: None   Principal Problem: Class 3 severe obesity due to excess calories with serious comorbidity and body mass index (BMI) of 50.0 to 59.9 in adult [E66.01,Z68.43]                 Active Insurance as of 5/11/2023     Primary Coverage     Payor Plan Insurance Group Employer/Plan Group    [employer] [facility] [employer] PPO [id number]     Payor Plan Address Payor Plan Phone Number Payor Plan Fax Number Effective Dates    [address] [phone]  1/1/2022 - None Entered    [place] [id number]       Subscriber Name Subscriber Birth Date Member ID       JEWEL MCKEON 1980 [id number]                 Emergency Contacts      (Rel.) Home Phone Work Phone Mobile Phone    marco antoniomali (Spouse) -- -- [phone]                Discharge Order (From admission, onward)     Start     Ordered    05/12/23 1235  Discharge patient  Once        Expected Discharge Date: 05/12/23    Expected Discharge Time: Afternoon    Discharge Disposition: Home or Self Care    Physician of Record for Attribution - Please select " from Treatment Team: JUAN LUIS MARTINEZ [[id number]]    Review needed by CMO to determine Physician of Record: No    Please choose which facility the patient is currently admitted if they are being discharged to another facility or unit.:  [facility]    Mode: Family       Question Answer Comment   Physician of Record for Attribution - Please select from Treatment Team JUAN LUIS MARTINEZ    Review needed by CMO to determine Physician of Record No    Please choose which facility the patient is currently admitted if they are being discharged to another facility or unit.  [facility]    Mode: Family        05/12/23 5337

## 2023-05-16 ENCOUNTER — HOSPITAL ENCOUNTER (EMERGENCY)
Facility: HOSPITAL | Age: 43
Discharge: HOME | End: 2023-05-16
Admitting: STUDENT IN AN ORGANIZED HEALTH CARE EDUCATION/TRAINING PROGRAM
Payer: COMMERCIAL

## 2023-05-16 ENCOUNTER — APPOINTMENT (OUTPATIENT)
Dept: CT IMAGING | Facility: HOSPITAL | Age: 43
End: 2023-05-16
Payer: COMMERCIAL

## 2023-05-16 VITALS
OXYGEN SATURATION: 96 % | TEMPERATURE: 97.9 F | SYSTOLIC BLOOD PRESSURE: 134 MMHG | BODY MASS INDEX: 47.09 KG/M2 | RESPIRATION RATE: 20 BRPM | DIASTOLIC BLOOD PRESSURE: 96 MMHG | WEIGHT: 293 LBS | HEIGHT: 66 IN | HEART RATE: 91 BPM

## 2023-05-16 DIAGNOSIS — Z90.3 S/P GASTRIC SLEEVE PROCEDURE: Primary | ICD-10-CM

## 2023-05-16 DIAGNOSIS — K56.7 POSTOPERATIVE ILEUS: ICD-10-CM

## 2023-05-16 DIAGNOSIS — D17.71 ANGIOMYOLIPOMA OF LEFT KIDNEY: ICD-10-CM

## 2023-05-16 DIAGNOSIS — K91.89 POSTOPERATIVE ILEUS: ICD-10-CM

## 2023-05-16 DIAGNOSIS — K76.9 LIVER LESION: ICD-10-CM

## 2023-05-16 LAB
ALBUMIN SERPL-MCNC: 4.3 G/DL (ref 3.5–5.2)
ALBUMIN/GLOB SERPL: 1.7 G/DL
ALP SERPL-CCNC: 60 U/L (ref 39–117)
ALT SERPL W P-5'-P-CCNC: 37 U/L (ref 1–33)
ANION GAP SERPL CALCULATED.3IONS-SCNC: 13 MMOL/L (ref 5–15)
AST SERPL-CCNC: 20 U/L (ref 1–32)
BASOPHILS # BLD AUTO: 0.03 10*3/MM3 (ref 0–0.2)
BASOPHILS NFR BLD AUTO: 0.3 % (ref 0–1.5)
BILIRUB SERPL-MCNC: 0.4 MG/DL (ref 0–1.2)
BUN SERPL-MCNC: 6 MG/DL (ref 6–20)
BUN/CREAT SERPL: 10.3 (ref 7–25)
CALCIUM SPEC-SCNC: 9 MG/DL (ref 8.6–10.5)
CHLORIDE SERPL-SCNC: 101 MMOL/L (ref 98–107)
CO2 SERPL-SCNC: 25 MMOL/L (ref 22–29)
CREAT SERPL-MCNC: 0.58 MG/DL (ref 0.57–1)
D-LACTATE SERPL-SCNC: 0.9 MMOL/L (ref 0.5–2)
DEPRECATED RDW RBC AUTO: 42.5 FL (ref 37–54)
EGFRCR SERPLBLD CKD-EPI 2021: 114.6 ML/MIN/1.73
EOSINOPHIL # BLD AUTO: 0.28 10*3/MM3 (ref 0–0.4)
EOSINOPHIL NFR BLD AUTO: 2.9 % (ref 0.3–6.2)
ERYTHROCYTE [DISTWIDTH] IN BLOOD BY AUTOMATED COUNT: 13.2 % (ref 12.3–15.4)
GLOBULIN UR ELPH-MCNC: 2.5 GM/DL
GLUCOSE SERPL-MCNC: 92 MG/DL (ref 65–99)
HCT VFR BLD AUTO: 42.1 % (ref 34–46.6)
HGB BLD-MCNC: 13.2 G/DL (ref 12–15.9)
IMM GRANULOCYTES # BLD AUTO: 0.03 10*3/MM3 (ref 0–0.05)
IMM GRANULOCYTES NFR BLD AUTO: 0.3 % (ref 0–0.5)
LIPASE SERPL-CCNC: 16 U/L (ref 13–60)
LYMPHOCYTES # BLD AUTO: 2.24 10*3/MM3 (ref 0.7–3.1)
LYMPHOCYTES NFR BLD AUTO: 23 % (ref 19.6–45.3)
MAGNESIUM SERPL-MCNC: 2.1 MG/DL (ref 1.6–2.6)
MCH RBC QN AUTO: 27.4 PG (ref 26.6–33)
MCHC RBC AUTO-ENTMCNC: 31.4 G/DL (ref 31.5–35.7)
MCV RBC AUTO: 87.5 FL (ref 79–97)
MONOCYTES # BLD AUTO: 0.74 10*3/MM3 (ref 0.1–0.9)
MONOCYTES NFR BLD AUTO: 7.6 % (ref 5–12)
NEUTROPHILS NFR BLD AUTO: 6.41 10*3/MM3 (ref 1.7–7)
NEUTROPHILS NFR BLD AUTO: 65.9 % (ref 42.7–76)
NRBC BLD AUTO-RTO: 0 /100 WBC (ref 0–0.2)
PLATELET # BLD AUTO: 305 10*3/MM3 (ref 140–450)
PMV BLD AUTO: 9.2 FL (ref 6–12)
POTASSIUM SERPL-SCNC: 4 MMOL/L (ref 3.5–5.2)
PROCALCITONIN SERPL-MCNC: 0.04 NG/ML (ref 0–0.25)
PROT SERPL-MCNC: 6.8 G/DL (ref 6–8.5)
RBC # BLD AUTO: 4.81 10*6/MM3 (ref 3.77–5.28)
SODIUM SERPL-SCNC: 139 MMOL/L (ref 136–145)
WBC NRBC COR # BLD: 9.73 10*3/MM3 (ref 3.4–10.8)

## 2023-05-16 PROCEDURE — 85025 COMPLETE CBC W/AUTO DIFF WBC: CPT | Performed by: PHYSICIAN ASSISTANT

## 2023-05-16 PROCEDURE — 25010000002 ONDANSETRON PER 1 MG: Performed by: PHYSICIAN ASSISTANT

## 2023-05-16 PROCEDURE — 25010000002 METHYLNALTREXONE 12 MG/0.6ML SOLUTION: Performed by: PHYSICIAN ASSISTANT

## 2023-05-16 PROCEDURE — 25510000001 IOPAMIDOL 61 % SOLUTION: Performed by: PHYSICIAN ASSISTANT

## 2023-05-16 PROCEDURE — 96372 THER/PROPH/DIAG INJ SC/IM: CPT

## 2023-05-16 PROCEDURE — 99284 EMERGENCY DEPT VISIT MOD MDM: CPT

## 2023-05-16 PROCEDURE — 74177 CT ABD & PELVIS W/CONTRAST: CPT

## 2023-05-16 PROCEDURE — 80053 COMPREHEN METABOLIC PANEL: CPT | Performed by: PHYSICIAN ASSISTANT

## 2023-05-16 PROCEDURE — 96375 TX/PRO/DX INJ NEW DRUG ADDON: CPT

## 2023-05-16 PROCEDURE — 25010000002 METOCLOPRAMIDE PER 10 MG: Performed by: PHYSICIAN ASSISTANT

## 2023-05-16 PROCEDURE — 83690 ASSAY OF LIPASE: CPT | Performed by: PHYSICIAN ASSISTANT

## 2023-05-16 PROCEDURE — 96374 THER/PROPH/DIAG INJ IV PUSH: CPT

## 2023-05-16 PROCEDURE — 84145 PROCALCITONIN (PCT): CPT | Performed by: PHYSICIAN ASSISTANT

## 2023-05-16 PROCEDURE — 83735 ASSAY OF MAGNESIUM: CPT | Performed by: PHYSICIAN ASSISTANT

## 2023-05-16 PROCEDURE — 83605 ASSAY OF LACTIC ACID: CPT | Performed by: PHYSICIAN ASSISTANT

## 2023-05-16 RX ORDER — SODIUM CHLORIDE 0.9 % (FLUSH) 0.9 %
10 SYRINGE (ML) INJECTION AS NEEDED
Status: DISCONTINUED | OUTPATIENT
Start: 2023-05-16 | End: 2023-05-16 | Stop reason: HOSPADM

## 2023-05-16 RX ORDER — METOCLOPRAMIDE HYDROCHLORIDE 5 MG/ML
10 INJECTION INTRAMUSCULAR; INTRAVENOUS ONCE
Status: COMPLETED | OUTPATIENT
Start: 2023-05-16 | End: 2023-05-16

## 2023-05-16 RX ORDER — HYDROCORTISONE ACETATE 25 MG/1
25 SUPPOSITORY RECTAL 2 TIMES DAILY
Qty: 12 EACH | Refills: 0 | Status: SHIPPED | OUTPATIENT
Start: 2023-05-16

## 2023-05-16 RX ORDER — ONDANSETRON 2 MG/ML
4 INJECTION INTRAMUSCULAR; INTRAVENOUS ONCE
Status: COMPLETED | OUTPATIENT
Start: 2023-05-16 | End: 2023-05-16

## 2023-05-16 RX ORDER — MAGNESIUM CARB/ALUMINUM HYDROX 105-160MG
30 TABLET,CHEWABLE ORAL ONCE
Status: COMPLETED | OUTPATIENT
Start: 2023-05-16 | End: 2023-05-16

## 2023-05-16 RX ADMIN — MINERAL OIL 30 ML: 1000 SOLUTION ORAL at 20:51

## 2023-05-16 RX ADMIN — IOPAMIDOL 100 ML: 612 INJECTION, SOLUTION INTRAVENOUS at 19:35

## 2023-05-16 RX ADMIN — METHYLNALTREXONE BROMIDE 12 MG: 12 INJECTION, SOLUTION SUBCUTANEOUS at 20:51

## 2023-05-16 RX ADMIN — SODIUM CHLORIDE 1000 ML: 9 INJECTION, SOLUTION INTRAVENOUS at 17:53

## 2023-05-16 RX ADMIN — METOCLOPRAMIDE HYDROCHLORIDE 10 MG: 5 INJECTION INTRAMUSCULAR; INTRAVENOUS at 20:59

## 2023-05-16 RX ADMIN — ONDANSETRON 4 MG: 2 INJECTION INTRAMUSCULAR; INTRAVENOUS at 17:53

## 2023-05-16 NOTE — TELEPHONE ENCOUNTER
Pt calling in again in regard to update on what to do with constipation. Pt was advised to go to the ER. Pt stated she is worse and tried to do it herself but only got a little out. Pt voiced an understanding with recommendation.

## 2023-05-16 NOTE — ED PROVIDER NOTES
Subjective   History of Present Illness    Patient is a 42-year-old female presenting to ED with constipation.  PMH significant for hiatal hernia repair and gastric sleeve performed on 5/11/2023, history of obesity, hypertension, GERD, thyroid disease, cholecystectomy, hysterectomy, endometrial ablation, ADHD, asthma, anxiety. Patient states 1 week ago she had a normal bowel movement and 2 days later had a gastric sleeve and hiatal hernia repair performed by Dr. Michele.  Patient states since that time she has been drinking 1 ounce of liquid every hour and has been doing well however she has been unable to have a bowel movement.  Patient has been trying Colace, milk of magnesia, as well as an enema most recently 3 days ago but states that she is only passing 1 very small piece of hard stool and is having worsening rectal pain.  Patient states that she can feel the stool and tried to use a glove and Vaseline for manual disimpaction causing worsening pain at which time she was advised by the bariatric surgery office to present to the ER for further evaluation due to concern of fecal impaction.  Patient states that when she is bearing down to have a bowel movement she will be nauseous and did have one episode of vomiting but outside of this has had no nausea or vomiting.  Patient states 3 days ago she stopped taking her narcotic pain medicine concerned this was worsening her constipation.  Patient reports her abdominal discomfort has been gradually improving and she has had no fevers, no chills, no diaphoresis, no dysuria, no hematuria.    Records reviewed show patient was seen by bariatric surgeon on 5/11/2023 for a gastric sleeve laparoscopic and hiatal hernia repair with the da Terence robot for which her first outpatient postoperative appointment is scheduled tomorrow.    Patient called the bariatric office yesterday concerns with having troubles having bowel movement despite use of Colace, milk of magnesia, and an  enema.  Patient was advised to mix MiraLAX in sugar-free Gatorade/apple juice/prune juice upon starting full liquids and continue to take her stool softener daily.    Patient called again at the bariatric office today with persistent symptoms at which time she was advised to go to the ER.    Review of Systems   Constitutional: Negative.  Negative for chills, diaphoresis and fever.   HENT: Negative.     Eyes: Negative.    Respiratory: Negative.     Cardiovascular: Negative.    Gastrointestinal:  Positive for abdominal pain (improving since surgery), constipation (last BM 1 week ago), nausea, rectal pain and vomiting (x1 during attempt to bear down for BM). Negative for blood in stool and diarrhea.   Genitourinary: Negative.  Negative for dysuria, flank pain and hematuria.   Musculoskeletal: Negative.    Skin: Negative.    Neurological: Negative.    Psychiatric/Behavioral: Negative.     All other systems reviewed and are negative.    Past Medical History:   Diagnosis Date    ADHD (attention deficit hyperactivity disorder)     Allergic     Avocado; Anaphylaxis    Allergic rhinitis     Anxiety     Asthma     Cholelithiasis     Colon polyp     Depression     Disease of thyroid gland     Hashimotos    Fibromyalgia, primary     GERD (gastroesophageal reflux disease)     Glaucoma 2010    Elevated Pressure    Headache 1996    Hernia     Hypertension     Obesity     Personal history of COVID-19 2023    PONV (postoperative nausea and vomiting)     Sleep apnea     Does Not use CPAP       Allergies   Allergen Reactions    Avocado Anaphylaxis    Prilosec [Omeprazole] Rash, Other (See Comments) and GI Intolerance     Achy joints    Promethazine Itching      - PHENERGAN -        Past Surgical History:   Procedure Laterality Date    ADENOIDECTOMY  1983     SECTION      CHOLECYSTECTOMY      COLONOSCOPY      ENDOMETRIAL ABLATION      ENDOSCOPY N/A 2023    Procedure:  ESOPHAGOGASTRODUODENOSCOPY WITH ANESTHESIA;  Surgeon: Binh Michele MD;  Location: [facility] ENDOSCOPY;  Service: General;  Laterality: N/A;  pre screen  post esophagitis       GASTRIC SLEEVE LAPAROSCOPIC N/A 5/11/2023    Procedure: GASTRIC SLEEVE LAPAROSCOPIC AND HIATAL HERNIA REPAIR WITH DAVINCI ROBOT;  Surgeon: Binh Michele MD;  Location:  [facility] OR;  Service: Robotics - DaVinci;  Laterality: N/A;    HYSTERECTOMY      SINUS SURGERY  2018    SUBTOTAL HYSTERECTOMY  2019    TONSILLECTOMY      TUBAL ABDOMINAL LIGATION  1999    Reversed in 2010       Family History   Adopted: Yes   Problem Relation Age of Onset    Asthma Mother     Thyroid disease Mother         Hyper    Depression Father     Learning disabilities Father         Dyslexia    Mental illness Father         Clinical Psycopath    Heart disease Maternal Grandmother     Lung cancer Maternal Grandmother     Colon cancer Maternal Grandmother     Cancer Maternal Grandmother         Colon Cancer (Twice)    Pancreatic cancer Maternal Grandfather     Cancer Maternal Grandfather         Pancreas Cancer    Thyroid disease Maternal Grandfather         Hypo    Cancer Paternal Grandmother         Colon & Lung Cancer    Diabetes Paternal Grandmother     Vision loss Paternal Grandmother     Early death Paternal Grandfather         Aneurysm < 40 years    Learning disabilities Son     Cancer Maternal Aunt         Breast Cancer < 40 years    Early death Paternal Aunt         Breast Cancer < 40 years       Social History     Socioeconomic History    Marital status:    Tobacco Use    Smoking status: Never    Smokeless tobacco: Never   Vaping Use    Vaping Use: Never used   Substance and Sexual Activity    Alcohol use: Not Currently     Alcohol/week: 2.0 standard drinks     Types: 2 Glasses of wine per week     Comment: social    Drug use: Never    Sexual activity: Yes     Partners: Male     Birth control/protection: Hysterectomy           Objective   Physical  Exam  Vitals and nursing note reviewed.   Constitutional:       General: She is in acute distress (appears uncomfortable due to pain).      Appearance: Normal appearance. She is well-developed and well-groomed. She is not toxic-appearing or diaphoretic.   HENT:      Head: Normocephalic.      Mouth/Throat:      Mouth: Mucous membranes are moist. Mucous membranes are dry.      Pharynx: Oropharynx is clear.   Eyes:      General: No scleral icterus.     Extraocular Movements: Extraocular movements intact.      Conjunctiva/sclera: Conjunctivae normal.      Pupils: Pupils are equal, round, and reactive to light.   Cardiovascular:      Rate and Rhythm: Regular rhythm. Tachycardia present.   Pulmonary:      Effort: Pulmonary effort is normal.      Breath sounds: Normal breath sounds.   Abdominal:      General: Bowel sounds are normal. There is no distension.      Palpations: Abdomen is soft.      Tenderness: There is no abdominal tenderness. There is no right CVA tenderness or left CVA tenderness.      Comments: Numerous well-healing laparoscopic incisions with surrounding healing ecchymosis and Dermabond consistent with hiatal hernia as well as laparoscopic gastric sleeve procedure.  No wound dehiscence, no surrounding or streaking erythema, and no discharge from any of the areas.  Very scant minimal tenderness overlying the incisions only with no further abdominal tenderness.   Musculoskeletal:         General: Normal range of motion.      Cervical back: Normal range of motion and neck supple.      Right lower leg: No edema.      Left lower leg: No edema.   Skin:     General: Skin is warm and dry.      Findings: Bruising (as described in abd section) present.   Neurological:      Mental Status: She is alert and oriented to person, place, and time.      Gait: Gait normal.   Psychiatric:         Mood and Affect: Mood normal. Mood is anxious.         Speech: Speech normal.         Behavior: Behavior normal. Behavior is  cooperative.       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  Radiology: ordered.    Risk  OTC drugs.  Prescription drug management.        Patient is a 42-year-old female presenting to ED with constipation.  PMH significant for hiatal hernia repair and gastric sleeve performed on 5/11/2023, history of obesity, hypertension, GERD, thyroid disease, cholecystectomy, hysterectomy, endometrial ablation, ADHD, asthma, anxiety.  Work-up revealed normal CBC including normal blood cell count, stable H&H, no further acute abnormalities.  CMP with no electrolyte disturbances as well as normal magnesium, normal renal and hepatic function.  Low concern for pancreatic abnormalities with lipase normal at 16.  Low concern for systemic or ischemic process with normal lactic acid as well as further low concern for systemic bacterial process with normal procalcitonin.   CT imaging of the abdomen and pelvis performed with oral as well as IV contrast in the setting of recent gastric sleeve showed: Postoperative changes including subcutaneous air and stranding within the anterior abdominal wall related to recent laparoscopic gastric  sleeve procedure. There is also mild stranding along the greater  curvature of the stomach and near the gastroesophageal juncture as would  be anticipated postoperatively. No evidence of extravasation of contrast  from the gastric lumen. There is no gastric outlet obstruction. No free  fluid or free air is noted within the abdomen or pelvis.  2. Mild fluid distention of the right and transverse colon as well as  nonspecific  fluid-filled bowel loops. There are scattered air-fluid  levels suggesting some stasis and this may represent a mild  postoperative ileus. There is no evidence of mechanical obstruction or  focal bowel wall thickening. The appendix is normal in appearance.  3. There are 2 hypodense lesions within the liver which would warrant  follow-up with outpatient ultrasound. Based on this single CT these are  not typical for hemangiomas. The larger lesion is in the posterior  segment of the right lobe of liver with a smaller lesion within the  medial segment of the left lobe. The patient has undergone prior  cholecystectomy.  4. No evidence of nephrolithiasis or obstructive uropathy. 2 small  angiomyolipomas are noted of the left kidney. The patient is status post  hysterectomy.   Patient was given a fluid bolus secondary to contrast.  Patient given Zofran which she was able to tolerate the oral contrast as she initially was too nauseous doing so.  Patient was given mineral oil, Relistor in the setting of known recent opioid use, as well as a dose of Reglan for nausea associated with drinking the mineral oil.  Patient reported feeling much better.  Patient remained hemodynamically stable.  Advised patient that she will need to follow-up with the bariatric surgeon office tomorrow as previously scheduled for her routine postoperative appointment.  Discussed need to maintain the diet as they have advised in progress per their recommendations.  Discussed importance of small frequent movements to help assist with bowel movements, need to avoid narcotics as tolerated to prevent further opioid-induced constipation, as well as ability to do warm sitz bath and the topical Anusol to assist with rectal pain and discomfort from bearing down and straining.  Patient has been advised on incidental findings and need for follow-up with her primary care provider for further monitoring of the liver lesions as well as angiomyolipomas.  Discussed  strict return precautions and need for immediate return to ED should she develop any new or worsening symptoms.  Patient is appreciative with no further questions, concerns, needs at this time and is stable for discharge.    Differential diagnosis: Gastric sleeve disruption, constipation, fecal impaction, bowel obstruction, ileus, colitis, diverticulitis, electrolyte disturbances, sepsis, CHRIS, systemic process.    Final diagnoses:   S/P gastric sleeve procedure   Postoperative ileus   Liver lesion   Angiomyolipoma of left kidney       ED Disposition  ED Disposition       ED Disposition   Discharge    Condition   Stable    Comment   --               Wendy Andrade, APRN  [address] [facility] [address]  [phone]    Schedule an appointment as soon as possible for a visit in 2 days      Binh Michele MD  [address] [id number] [phone]      Follow-up tomorrow as previously scheduled    [facility] Emergency Department  [address] [id number] [phone]    As needed         Medication List        New Prescriptions      hydrocortisone 25 MG suppository  Commonly known as: ANUSOL-HC  Insert 1 suppository into the rectum 2 (Two) Times a Day.            Changed      cyanocobalamin 1000 MCG/ML injection  Inject 1 mL into the appropriate muscle as directed by prescriber 1 (One) Time Per Week.  What changed: additional instructions     * sertraline 100 MG tablet  Commonly known as: ZOLOFT  What changed: Another medication with the same name was changed. Make sure you understand how and when to take each.     * sertraline 20 MG/ML concentrated solution  Commonly known as: ZOLOFT  Take 5 mL by mouth Daily for 14 days.  What changed: additional instructions     vitamin D 1.25 MG (73931 UT) capsule capsule  Commonly known as: ERGOCALCIFEROL  Take 1 capsule by mouth 1 (One) Time Per Week.  What changed: additional instructions           *  This list has 2 medication(s) that are the same as other medications prescribed for you. Read the directions carefully, and ask your doctor or other care provider to review them with you.                   Where to Get Your Medications        These medications were sent to [id number] - [place], [place] - [address] - [phone]  - [phone]   [address], [place] [address]      Phone: [phone]   hydrocortisone 25 MG suppository            Bora Gomez PA-C  05/17/23 0030

## 2023-05-17 ENCOUNTER — OFFICE VISIT (OUTPATIENT)
Dept: BARIATRICS/WEIGHT MGMT | Facility: CLINIC | Age: 43
End: 2023-05-17
Payer: COMMERCIAL

## 2023-05-17 ENCOUNTER — TELEPHONE (OUTPATIENT)
Dept: BARIATRICS/WEIGHT MGMT | Facility: CLINIC | Age: 43
End: 2023-05-17

## 2023-05-17 VITALS
TEMPERATURE: 97.8 F | HEIGHT: 66 IN | DIASTOLIC BLOOD PRESSURE: 87 MMHG | WEIGHT: 293 LBS | HEART RATE: 84 BPM | OXYGEN SATURATION: 97 % | SYSTOLIC BLOOD PRESSURE: 139 MMHG | BODY MASS INDEX: 47.09 KG/M2

## 2023-05-17 DIAGNOSIS — E66.01 CLASS 3 SEVERE OBESITY DUE TO EXCESS CALORIES WITH SERIOUS COMORBIDITY AND BODY MASS INDEX (BMI) OF 50.0 TO 59.9 IN ADULT: ICD-10-CM

## 2023-05-17 DIAGNOSIS — Z98.84 STATUS POST LAPAROSCOPIC SLEEVE GASTRECTOMY: Primary | ICD-10-CM

## 2023-05-17 DIAGNOSIS — Z87.19 HISTORY OF REPAIR OF HIATAL HERNIA: ICD-10-CM

## 2023-05-17 DIAGNOSIS — Z98.890 HISTORY OF REPAIR OF HIATAL HERNIA: ICD-10-CM

## 2023-05-17 DIAGNOSIS — K59.03 DRUG-INDUCED CONSTIPATION: ICD-10-CM

## 2023-05-17 DIAGNOSIS — K44.9 PARAESOPHAGEAL HERNIA: ICD-10-CM

## 2023-05-17 PROCEDURE — 99024 POSTOP FOLLOW-UP VISIT: CPT | Performed by: SURGERY

## 2023-05-17 RX ORDER — MINERAL OIL 100 G/100G
1 OIL RECTAL DAILY
Qty: 1 EACH | Refills: 1 | Status: SHIPPED | OUTPATIENT
Start: 2023-05-17

## 2023-05-17 RX ORDER — MINERAL OIL 100 G/100G
1 OIL RECTAL ONCE
Qty: 1 EACH | Refills: 1 | Status: SHIPPED | OUTPATIENT
Start: 2023-05-17 | End: 2023-05-17

## 2023-05-17 NOTE — DISCHARGE INSTRUCTIONS
As we discussed please continue using the mineral oil.  Please continue your diet as advised by the bariatric surgeon and progressed to puréed food as previously recommended.  Please follow-up with the bariatric surgeon tomorrow as previously scheduled for postoperative reevaluation as well as to discuss your constipation.  You may continue to do sitz bath's, use the witch hazel topically for rectal pain relief.  Should you develop any new or worsening symptoms please return to the ER for further evaluation.

## 2023-05-17 NOTE — PROGRESS NOTES
Patient Care Team:  Wendy Andrade APRN as PCP - General (Nurse Practitioner)    Subjective     Allison Mckeon is a 42 y.o. female.     Allison is post op 1 week from her sleeve gastrectomy.  She is currently on her stage I diet.  Patient states that she had suffered from constipation and felt hard stool at her rectum.  She was unable to evacuate these stools without manual assistance.  She was attempting the conservative methods first with MiraLAX, milk of mag and prune juice without success.  She was able to pass flatus but still was uncomfortable with the sensation of needing to pass stool.  From her description she was recommended to go to the emergency department to be evaluated in for possible disimpaction.  The patient visited the emergency department yesterday and received a CT scan as well as IV fluids.  No disimpaction was performed or indicated from the note.  The patient did have nausea but attributes this to the large amounts of oral CT fluids they required her to do.  She also was required to drink mineral oil.  The patient stated that she advised the emergency department team to contact our program which was not done.  As a result she was instructed to come in earlier today to be evaluated.  She did not have a bowel movement or relief from or after ED visit.  Specs to her sleep progression aside from her constipation she has been able to tolerate at least 30 ounces of fluid and has not started her protein shakes as of yet.    Review Of Systems:  General ROS: negative  Respiratory ROS: no cough, shortness of breath, or wheezing  Cardiovascular ROS: no chest pain or dyspnea on exertion  Gastrointestinal ROS: no abdominal pain, change in bowel habits, or black or bloody stools  positive for - nausea/vomiting    The following portions of the patient's history were reviewed and updated as appropriate: allergies, current medications, past family history, past medical history, past social history, past  "surgical history, and problem list.    Objective   /87 (BP Location: Right arm, Patient Position: Sitting, Cuff Size: Adult)   Pulse 84   Temp 97.8 °F (36.6 °C)   Ht 167.6 cm (66\")   Wt (!) 148 kg (326 lb 3.2 oz)   SpO2 97%   BMI 52.65 kg/m²       05/17/23  1111   Weight: (!) 148 kg (326 lb 3.2 oz)        General Appearance:  awake, alert, oriented, in no acute distress  Lungs:  Normal expansion.  Clear to auscultation.  No rales, rhonchi, or wheezing.  Heart:  Heart regular rate and rhythm  Abdomen:  Soft, non-tender, normal bowel sounds; no bruits, organomegaly or masses.  Abnormal shape: obese  Extremities: Extremities warm to touch, pink, with no edema.  Rectal: Digital rectal exam was performed.  Patient had no hard stool in her rectal vault.  Tenderness around the anal location consistent with possible fissure.  Tight sphincter tone.  Possible internal hemorrhoid.  No gross blood noted on rectal exam.  Patient had mild relief upon completion of the exam  Wounds: clean, dry, intact    ASSESSMENT/ PLAN    Encounter Diagnoses   Name Primary?    Status post laparoscopic sleeve gastrectomy Yes    Class 3 severe obesity due to excess calories with serious comorbidity and body mass index (BMI) of 50.0 to 59.9 in adult     Drug-induced constipation     History of repair of hiatal hernia     Paraesophageal hernia      Constipation secondary to postop general anesthesia and pain medication presumably.  Due to the patient's presentation and exam I explained to the patient she may consider now using enemas.  I recommended soapsuds and/or oil/mineral based enemas.  She will also have a suppository that will help alleviate her anal pain.  She is to continue to progress her fluids to 64 ounces.  She is to start her multivitamins as well as directed.  If she has symptoms she is to return to our facility.  Due to the issue of communication the patient has discussed this emergency department visit with our office " manager.  We will follow through as well.  The patient and I discussed their weight restrictions which is defined as avoid lifting greater than 15 lbs for at least 4 weeks to allow healing of her tissue.  I also discussed the avoidance of driving or operating a motor vehicle if she requires, needs taking pain medication, or has a distracting injury or pain because of the risk of injuring herself or others.  The patient may advance diet as directed.    05/17/23  12:04 CDT  Patient Care Team:  Wendy Andrade APRN as PCP - General (Nurse Practitioner)  Binh Michele MD FACS

## 2023-05-17 NOTE — TELEPHONE ENCOUNTER
:  [phone]         Patient doing: c/o constipation discomfort        PROCEDURE INFORMATION:   Date of Procedure: 05/11/2023  Follow up appointment: 05/17/2023      1. Are you tolerating liquids? yes  Reason:      2. How many ounces of liquid are you getting per day?  28-32      3. Are you ambulating well? yes     4. Do you have nausea or vomiting? Yes, while straining to have a bowel movement     5. How do your incisions look? fine     6. Do you have any concerns?  Went to the ED yesterday for constipation. She had a CT of her abdomen. She prescribed [place] and mineral oil for constipation but this has not helped. She will see Dr Michele today.

## 2023-05-18 ENCOUNTER — TELEPHONE (OUTPATIENT)
Dept: BARIATRICS/WEIGHT MGMT | Facility: CLINIC | Age: 43
End: 2023-05-18
Payer: COMMERCIAL

## 2023-05-18 NOTE — TELEPHONE ENCOUNTER
S/P 05/11/2023    Called to check up on the patient. She was able to  pain cream but both pharmacies were out of the ordered enema so they had to overnight. Her  is on his way to pick it up. She will call us back later to update us on   the outcome. Her pain has improved.

## 2023-05-31 ENCOUNTER — TELEPHONE (OUTPATIENT)
Dept: BARIATRICS/WEIGHT MGMT | Facility: CLINIC | Age: 43
End: 2023-05-31
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
## 2023-06-01 ENCOUNTER — OFFICE VISIT (OUTPATIENT)
Dept: BARIATRICS/WEIGHT MGMT | Facility: CLINIC | Age: 43
End: 2023-06-01
Payer: COMMERCIAL

## 2023-06-01 VITALS
SYSTOLIC BLOOD PRESSURE: 109 MMHG | BODY MASS INDEX: 47.09 KG/M2 | WEIGHT: 293 LBS | HEIGHT: 66 IN | HEART RATE: 84 BPM | OXYGEN SATURATION: 96 % | TEMPERATURE: 98.7 F | DIASTOLIC BLOOD PRESSURE: 77 MMHG

## 2023-06-01 DIAGNOSIS — D51.0 PERNICIOUS ANEMIA: ICD-10-CM

## 2023-06-01 DIAGNOSIS — E66.01 CLASS 3 SEVERE OBESITY DUE TO EXCESS CALORIES WITH SERIOUS COMORBIDITY AND BODY MASS INDEX (BMI) OF 45.0 TO 49.9 IN ADULT: ICD-10-CM

## 2023-06-01 DIAGNOSIS — R10.30 LOWER ABDOMINAL PAIN: ICD-10-CM

## 2023-06-01 DIAGNOSIS — Z98.84 STATUS POST LAPAROSCOPIC SLEEVE GASTRECTOMY: Primary | ICD-10-CM

## 2023-06-01 PROCEDURE — 99024 POSTOP FOLLOW-UP VISIT: CPT | Performed by: NURSE PRACTITIONER

## 2023-06-01 RX ORDER — CYANOCOBALAMIN 1000 UG/ML
1000 INJECTION, SOLUTION INTRAMUSCULAR; SUBCUTANEOUS WEEKLY
Qty: 10 ML | Refills: 1 | Status: SHIPPED | OUTPATIENT
Start: 2023-06-01

## 2023-06-05 PROBLEM — R10.30 LOWER ABDOMINAL PAIN: Status: ACTIVE | Noted: 2023-06-05
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
## 2023-06-08 ENCOUNTER — OFFICE VISIT (OUTPATIENT)
Dept: INTERNAL MEDICINE | Facility: CLINIC | Age: 43
End: 2023-06-08
Payer: COMMERCIAL

## 2023-06-08 ENCOUNTER — HOSPITAL ENCOUNTER (OUTPATIENT)
Dept: GENERAL RADIOLOGY | Facility: HOSPITAL | Age: 43
Discharge: HOME | End: 2023-06-08
Payer: COMMERCIAL

## 2023-06-08 VITALS
SYSTOLIC BLOOD PRESSURE: 136 MMHG | OXYGEN SATURATION: 98 % | BODY MASS INDEX: 47.09 KG/M2 | TEMPERATURE: 97.3 F | HEART RATE: 92 BPM | DIASTOLIC BLOOD PRESSURE: 84 MMHG | HEIGHT: 66 IN | WEIGHT: 293 LBS

## 2023-06-08 DIAGNOSIS — S16.1XXA STRAIN OF NECK MUSCLE, INITIAL ENCOUNTER: ICD-10-CM
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
## 2023-06-08 DIAGNOSIS — G44.86 CERVICOGENIC HEADACHE: ICD-10-CM

## 2023-06-08 DIAGNOSIS — L23.9 ALLERGIC CONTACT DERMATITIS, UNSPECIFIED TRIGGER: ICD-10-CM

## 2023-06-08 PROCEDURE — 72040 X-RAY EXAM NECK SPINE 2-3 VW: CPT

## 2023-06-08 RX ORDER — CYCLOBENZAPRINE HCL 10 MG
10 TABLET ORAL 3 TIMES DAILY PRN
Qty: 30 TABLET | Refills: 0 | Status: SHIPPED | OUTPATIENT
Start: 2023-06-08
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
## 2023-06-08 NOTE — LETTER
June 8, 2023     Patient: Allison Mckeon   YOB: 1980   Date of Visit: 6/8/2023       To Whom It May Concern:    It is my medical opinion that Allison Mckeon may return to work on 6/12/2023. Please feel free to call my office for any questions or concerns.       Sincerely,        Wendy Andrade APRN

## 2023-06-09 NOTE — PROGRESS NOTES
Normal cervical spine x-ray with no evidence of fracture.  Patient to continue treatment for muscle strain with muscle relaxer and compounded cream.  Please call for any worsening or no improvement of symptoms.

## 2023-06-12 ENCOUNTER — OFFICE VISIT (OUTPATIENT)
Dept: BARIATRICS/WEIGHT MGMT | Facility: CLINIC | Age: 43
End: 2023-06-12
Payer: COMMERCIAL

## 2023-06-12 VITALS
DIASTOLIC BLOOD PRESSURE: 80 MMHG | OXYGEN SATURATION: 97 % | TEMPERATURE: 98.4 F | WEIGHT: 293 LBS | BODY MASS INDEX: 47.09 KG/M2 | HEIGHT: 66 IN | HEART RATE: 88 BPM | SYSTOLIC BLOOD PRESSURE: 118 MMHG

## 2023-06-12 DIAGNOSIS — Z98.84 STATUS POST LAPAROSCOPIC SLEEVE GASTRECTOMY: Primary | ICD-10-CM

## 2023-06-12 DIAGNOSIS — I10 PRIMARY HYPERTENSION: ICD-10-CM

## 2023-06-12 DIAGNOSIS — Z98.890 HISTORY OF REPAIR OF HIATAL HERNIA: ICD-10-CM

## 2023-06-12 DIAGNOSIS — Z87.19 HISTORY OF REPAIR OF HIATAL HERNIA: ICD-10-CM

## 2023-06-12 PROCEDURE — 99024 POSTOP FOLLOW-UP VISIT: CPT | Performed by: SURGERY

## 2023-06-12 NOTE — PROGRESS NOTES
"  Patient Care Team:  Wendy Andrade APRN as PCP - General (Nurse Practitioner)    Subjective     Allison Mckeon is a 43 y.o. female.     Allison is post op 1 month from her sleeve gastrectomy and hiatal hernia repair.  She is currently on her regular diet.  She is consuming 4 meals a day with portion sizes no greater than a half a cup.  She consumes at least 64 ounces of fluid and 50 g of protein daily.  She exercises in the form of walking for 15 minutes on a regular basis.  She is also taking her multivitamins in the form of a patch.    Review Of Systems:  General ROS: positive for  - fatigue  Respiratory ROS: no cough, shortness of breath, or wheezing  Cardiovascular ROS: no chest pain or dyspnea on exertion  Gastrointestinal ROS: no abdominal pain, change in bowel habits, or black or bloody stools  positive for - nausea    The following portions of the patient's history were reviewed and updated as appropriate: allergies, current medications, past family history, past medical history, past social history, past surgical history, and problem list.    Objective   /80 (BP Location: Right arm, Patient Position: Sitting, Cuff Size: Adult)   Pulse 88   Temp 98.4 °F (36.9 °C)   Ht 167.6 cm (66\")   Wt (!) 141 kg (310 lb)   SpO2 97%   BMI 50.04 kg/m²       06/12/23  1043   Weight: (!) 141 kg (310 lb)        General Appearance:  awake, alert, oriented, in no acute distress  Abdomen:  Soft, non-tender, normal bowel sounds; no bruits, organomegaly or masses.  Abnormal shape: obese  Wounds: clean, dry, intact    ASSESSMENT/ PLAN    Encounter Diagnoses   Name Primary?    Status post laparoscopic sleeve gastrectomy Yes    Primary hypertension     History of repair of hiatal hernia      Allison Mckeon and I discussed the importance of changing behavior for consistent and successful weight loss.  We first reviewed again the definition of a meal which is defined as portion sizes less than a half a cup and those " portions incorporating a vegetable.  Specifically the vegetable should fill half of that volume.  I also explained that she should attempt to consume 4 meals as defined above daily and to avoid snacking or grazing.  She should also be mindful of adequate hydration by consuming at least 64 oz of water daily and incorporation of a regular exercise regimen.     I discussed the importance of taking her multivitamins as directed.  We discussed the importance to be aware of foods that might have an addictive component with their behavior.  Those that are considered addictive based off of how they are consumed should be avoided.  Specifically if there is limited nutritional value and the food choice it should be avoided and substituted with something that would provide more nutrition.    She is to return to our office 2 months or as needed.    06/12/23  15:35 CDT  Patient Care Team:  Wendy Andrade APRN as PCP - General (Nurse Practitioner)  Binh Michele MD FACS

## 2023-06-13 RX ORDER — LOSARTAN POTASSIUM 100 MG/1
100 TABLET ORAL DAILY
Qty: 90 TABLET | Refills: 3 | Status: SHIPPED | OUTPATIENT
Start: 2023-06-13

## 2023-07-25 ENCOUNTER — OFFICE VISIT (OUTPATIENT)
Dept: INTERNAL MEDICINE | Facility: CLINIC | Age: 43
End: 2023-07-25
Payer: COMMERCIAL

## 2023-07-25 VITALS
HEIGHT: 66 IN | TEMPERATURE: 97.3 F | SYSTOLIC BLOOD PRESSURE: 110 MMHG | BODY MASS INDEX: 45 KG/M2 | DIASTOLIC BLOOD PRESSURE: 80 MMHG | WEIGHT: 280 LBS | HEART RATE: 92 BPM | OXYGEN SATURATION: 97 % | RESPIRATION RATE: 16 BRPM

## 2023-07-25 DIAGNOSIS — I10 PRIMARY HYPERTENSION: ICD-10-CM

## 2023-07-25 DIAGNOSIS — G44.52 NEW DAILY PERSISTENT HEADACHE: ICD-10-CM

## 2023-07-25 DIAGNOSIS — Z98.84 STATUS POST BARIATRIC SURGERY: Primary | ICD-10-CM

## 2023-07-25 PROCEDURE — 99214 OFFICE O/P EST MOD 30 MIN: CPT | Performed by: NURSE PRACTITIONER

## 2023-07-25 NOTE — PROGRESS NOTES
Subjective     Chief Complaint:  Headache    HPI:  The patient presents to the office today for a 3-month follow-up regarding hypertension management.  She does complain of a dull headache for the last week.  Please see assessment and plan below.    Patient's PMR from outside medical facility reviewed and noted.    Past Medical History:   Past Medical History:   Diagnosis Date    ADHD (attention deficit hyperactivity disorder)     Allergic     Avocado; Anaphylaxis    Allergic rhinitis     Anxiety     Asthma     Cholelithiasis     Colon polyp     Depression     Disease of thyroid gland     Hashimotos    Fibromyalgia, primary     GERD (gastroesophageal reflux disease)     Glaucoma     Elevated Pressure    Headache     Hernia     Hypertension     Obesity     Personal history of COVID-19 2023    PONV (postoperative nausea and vomiting)     Sleep apnea     Does Not use CPAP     Past Surgical History:  Past Surgical History:   Procedure Laterality Date    ADENOIDECTOMY       SECTION      CHOLECYSTECTOMY      COLONOSCOPY      ENDOMETRIAL ABLATION      ENDOSCOPY N/A 2023    Procedure: ESOPHAGOGASTRODUODENOSCOPY WITH ANESTHESIA;  Surgeon: Binh Michele MD;  Location: [facility] ENDOSCOPY;  Service: General;  Laterality: N/A;  pre screen  post esophagitis       GASTRIC SLEEVE LAPAROSCOPIC N/A 2023    Procedure: GASTRIC SLEEVE LAPAROSCOPIC AND HIATAL HERNIA REPAIR WITH DAVINCI ROBOT;  Surgeon: Binh Michele MD;  Location: [facility] OR;  Service: Robotics - DaVinci;  Laterality: N/A;    HYSTERECTOMY      SINUS SURGERY  2018    SUBTOTAL HYSTERECTOMY  2019    TONSILLECTOMY      TUBAL ABDOMINAL LIGATION  1999    Reversed in      Social History:  reports that she has never smoked. She has never used smokeless tobacco. She reports that she does not currently use alcohol after a past usage of about 2.0 standard drinks per week. She  reports that she does not use drugs.    Family History: family history includes Asthma in her mother; Cancer in her maternal aunt, maternal grandfather, maternal grandmother, and paternal grandmother; Colon cancer in her maternal grandmother; Depression in her father; Diabetes in her paternal grandmother; Early death in her paternal aunt and paternal grandfather; Heart disease in her maternal grandmother; Learning disabilities in her father and son; Lung cancer in her maternal grandmother; Mental illness in her father; Pancreatic cancer in her maternal grandfather; Thyroid disease in her maternal grandfather and mother; Vision loss in her paternal grandmother. She was adopted.      Allergies:  Allergies   Allergen Reactions    Avocado Anaphylaxis    Prilosec [Omeprazole] Rash, Other (See Comments) and GI Intolerance     Achy joints    Promethazine Itching      - PHENERGAN -      Medications:  Prior to Admission medications    Medication Sig Start Date End Date Taking? Authorizing Provider   busPIRone (BUSPAR) 7.5 MG tablet Take 1 tablet by mouth 3 (Three) Times a Day As Needed (anxiety). 3/2/23  Yes Wendy Andrade APRN   Calcium Citrate-Vitamin D (CALCIUM CITRATE CHEWY BITE PO) Take 1 dose by mouth 3 (Three) Times a Day.   Yes ProviderAníbal MD   clotrimazole-betamethasone (LOTRISONE) 1-0.05 % cream Apply 1 application  topically to the appropriate area as directed 2 (Two) Times a Day.   Yes ProviderAníbal MD   cyanocobalamin 1000 MCG/ML injection Inject 1 mL into the appropriate muscle as directed by prescriber 1 (One) Time Per Week. VITAMIN B12 6/1/23  Yes Wendy Andrade APRN   cyclobenzaprine (FLEXERIL) 10 MG tablet Take 1 tablet by mouth 3 (Three) Times a Day As Needed for Muscle Spasms. 6/8/23  Yes Wendy Andrade APRN   losartan (COZAAR) 100 MG tablet Take 1 tablet by mouth Daily. 6/13/23  Yes Wendy Andrade APRN   montelukast (SINGULAIR) 10 MG tablet Take 1 tablet by mouth  "Every Night.   Yes Aníbal Beltran MD   Multiple Vitamins-Minerals (BARIATRIC MULTIVITAMINS/IRON PO) Take 1 tablet by mouth Daily.   Yes Aníbal Beltran MD   ondansetron ODT (ZOFRAN-ODT) 4 MG disintegrating tablet Place 1 tablet on the tongue Every 8 (Eight) Hours As Needed for Nausea or Vomiting. 5/12/23  Yes Binh Michele MD   pantoprazole (PROTONIX) 40 MG EC tablet Take 1 tablet by mouth Daily. 5/12/23  Yes Binh Michele MD   Probiotic Product (FloraVantage) capsule Take 1 each by mouth Daily. CHEWABLE   Yes Aníbal Beltran MD   sertraline (ZOLOFT) 100 MG tablet Take 50 mg by mouth Daily. 8/12/22  Yes Aníbal Beltran MD   vitamin D (ERGOCALCIFEROL) 1.25 MG (32806 UT) capsule capsule Take 1 capsule by mouth 1 (One) Time Per Week.  Patient taking differently: Take 1 capsule by mouth 1 (One) Time Per Week. EDNIS 3/3/23  Yes Wendy Andrade APRN   carvedilol (Coreg) 12.5 MG tablet Take 1 tablet by mouth 2 (Two) Times a Day With Meals.  Patient not taking: Reported on 7/25/2023 4/18/23   Wendy Andrade APRN   lidocaine Apply 1 application topically to the appropriate area as directed Daily As Needed (Neck pain). 6/8/23   Wendy Andrade APRN   methylphenidate 36 MG CR tablet Take 1 tablet by mouth Daily - COTEMPLA -  Patient not taking: Reported on 7/25/2023 11/3/22 7/25/23  Aníbal Beltran MD       Objective     Vital Signs: /80 (BP Location: Left arm, Patient Position: Sitting, Cuff Size: Adult)   Pulse 92   Temp 97.3 °F (36.3 °C) (Infrared)   Resp 16   Ht 167.6 cm (66\")   Wt 127 kg (280 lb)   SpO2 97%   BMI 45.19 kg/m²   Physical Exam  Vitals and nursing note reviewed.   Constitutional:       General: She is not in acute distress.     Appearance: She is obese. She is not ill-appearing or toxic-appearing.   HENT:      Head: Normocephalic and atraumatic.      Mouth/Throat:      Mouth: Mucous membranes are moist.      Pharynx: Oropharynx is clear. "   Cardiovascular:      Rate and Rhythm: Normal rate and regular rhythm.      Pulses: Normal pulses.      Heart sounds: Normal heart sounds.   Pulmonary:      Effort: Pulmonary effort is normal.      Breath sounds: No wheezing, rhonchi or rales.   Abdominal:      General: Bowel sounds are normal. There is no distension.      Palpations: Abdomen is soft.      Tenderness: There is no abdominal tenderness.   Musculoskeletal:         General: No swelling or tenderness. Normal range of motion.      Cervical back: Normal range of motion and neck supple. No tenderness.   Skin:     General: Skin is warm and dry.      Findings: No erythema or rash.   Neurological:      General: No focal deficit present.      Mental Status: She is alert and oriented to person, place, and time.   Psychiatric:         Mood and Affect: Mood normal.         Behavior: Behavior normal.         Thought Content: Thought content normal.         Judgment: Judgment normal.     Results Reviewed:  Office Visit with Binh Michele MD (06/12/2023)     Assessment / Plan     Assessment/Plan:  Diagnoses and all orders for this visit:    1. Status post bariatric surgery (Primary)  -     Vitamin B12  -     Vitamin B6  -     Zinc  -     Vitamin D 25 hydroxy  -     Basic metabolic panel  -     CBC & Differential  -     Magnesium    2. Primary hypertension    3. New daily persistent headache       Patient presents to the office today for a follow-up regarding blood pressure management.  She had had great difficulty controlling her blood pressure in April of this year, even requiring visits to the emergency department for markedly elevated blood pressures causing symptoms of headache, dizziness, nausea and confusion.  We had continued her medication losartan and had placed her on Coreg as she had been having elevated heart rates as well.  This regimen seemed to work well for her.  The patient states she had an episode last month where her smart watch had alerted  her about elevated heart rates greater than 120 for prolonged period of time and while at rest.  The patient states this resolved within a couple of days and then she had actually noted that her heart rate was dropping too low and she felt very fatigued.  She did stop her Coreg of her own accord.  She has noted that her blood pressure has stayed within normal range.  Today this is 110/80 and she states this is about what it has been running at home as well.  Her resting heart rate has improved as well and she states is typically in the 70s at rest.  She denies any chest pain, shortness of breath, dizziness/lightheadedness.  She can continue off of Coreg and continue on losartan 100 mg daily.  My hope is that as she continues to lose weight from bariatric surgery we may be able to wean her off of antihypertensive medications completely.    She has had a dull headache for the last week.  She has been taking Tylenol, which has not been very effective.  She denies any associated vision changes, photophobia or phonophobia.  She has had some nausea, which she states has been an intermittent issue for her since having bariatric surgery and does not necessarily feel that this is associated with her headache.  She has had no vomiting.  Neurological exam is unremarkable at this time, so we will hold on any imaging.  We will check labs today as above to see if any underlying abnormalities can be found which may account for headache.  We have checked several vitamin levels due to recent bariatric surgery to see if this may be contributory.    Return in about 3 months (around 10/25/2023) for Recheck. Please push out September appt to October. unless patient needs to be seen sooner or acute issues arise.      I have discussed the patient results/orders and and plan/recommendation with them at today's visit.      Wendy Andrade, APRN   07/25/2023

## 2023-07-29 LAB
25(OH)D3+25(OH)D2 SERPL-MCNC: 49.7 NG/ML (ref 30–100)
BUN SERPL-MCNC: 20 MG/DL (ref 6–20)
BUN/CREAT SERPL: 29.9 (ref 7–25)
CALCIUM SERPL-MCNC: 9.5 MG/DL (ref 8.6–10.5)
CHLORIDE SERPL-SCNC: 105 MMOL/L (ref 98–107)
CO2 SERPL-SCNC: 23.1 MMOL/L (ref 22–29)
CREAT SERPL-MCNC: 0.67 MG/DL (ref 0.57–1)
EGFRCR SERPLBLD CKD-EPI 2021: 110 ML/MIN/1.73
GLUCOSE SERPL-MCNC: 100 MG/DL (ref 65–99)
MAGNESIUM SERPL-MCNC: 1.9 MG/DL (ref 1.6–2.6)
POTASSIUM SERPL-SCNC: 4.4 MMOL/L (ref 3.5–5.2)
PYRIDOXAL PHOS SERPL-MCNC: 9.2 UG/L (ref 3.4–65.2)
SODIUM SERPL-SCNC: 139 MMOL/L (ref 136–145)
VIT B12 SERPL-MCNC: 618 PG/ML (ref 211–946)
ZINC SERPL-MCNC: 89 UG/DL (ref 44–115)

## 2023-07-31 NOTE — PROGRESS NOTES
Vitamin levels all appear to be within normal limits.  Patient's labs did appear to be consistent with mild dehydration which could have contributed to her headache.

## 2023-08-08 ENCOUNTER — OFFICE VISIT (OUTPATIENT)
Dept: BARIATRICS/WEIGHT MGMT | Facility: CLINIC | Age: 43
End: 2023-08-08
Payer: COMMERCIAL

## 2023-08-08 VITALS
BODY MASS INDEX: 45.29 KG/M2 | HEIGHT: 66 IN | TEMPERATURE: 97.3 F | HEART RATE: 75 BPM | OXYGEN SATURATION: 95 % | DIASTOLIC BLOOD PRESSURE: 91 MMHG | WEIGHT: 281.8 LBS | SYSTOLIC BLOOD PRESSURE: 137 MMHG

## 2023-08-08 DIAGNOSIS — Z98.84 STATUS POST LAPAROSCOPIC SLEEVE GASTRECTOMY: Primary | ICD-10-CM

## 2023-08-08 DIAGNOSIS — E66.01 CLASS 3 SEVERE OBESITY DUE TO EXCESS CALORIES WITH SERIOUS COMORBIDITY AND BODY MASS INDEX (BMI) OF 45.0 TO 49.9 IN ADULT: ICD-10-CM

## 2023-08-08 DIAGNOSIS — K21.9 GASTROESOPHAGEAL REFLUX DISEASE WITHOUT ESOPHAGITIS: ICD-10-CM

## 2023-08-08 DIAGNOSIS — I10 PRIMARY HYPERTENSION: ICD-10-CM

## 2023-08-08 PROCEDURE — 99024 POSTOP FOLLOW-UP VISIT: CPT | Performed by: NURSE PRACTITIONER

## 2023-08-08 NOTE — ASSESSMENT & PLAN NOTE
Patient's (Body mass index is 45.48 kg/mý.) indicates that they are morbidly/severely obese (BMI > 40 or > 35 with obesity - related health condition) with health conditions that include hypertension . Weight is improving with treatment. BMI  is above average; BMI management plan is completed. We discussed portion control and increasing exercise.

## 2023-08-08 NOTE — PROGRESS NOTES
"Patient Care Team:  Wendy Andrade APRN as PCP - General (Nurse Practitioner)    Chief Complaint: S/P Sleeve Gastrectomy, 3 months     Subjective     Allison Mckeon is POD 3 months from a sleeve gastrectomy. Patient admits to eating around 4 meals per day and denies to grazing in between meals. She admits to drinking at least 64 ounces or more of fluid each day and  60-75 grams of protein. She is currently exercising: by walking some days.. She states that she has gone without soda intake and denies  nicotine use. Patient has lost 29  pound since her last appointment with us.     Patient denies  struggling with drinking while she is eating. She also denies to eating quickly instead of taking 30 minutes to eat her meals.       Review of Systems:     Review of Systems   Constitutional: Negative.    Respiratory: Negative.     Cardiovascular: Negative.    Gastrointestinal: Negative.    Endocrine: Negative.    Musculoskeletal: Negative.    Psychiatric/Behavioral: Negative.        Objective     Vital Signs  /91 (BP Location: Right arm, Patient Position: Sitting, Cuff Size: Adult)   Pulse 75   Temp 97.3 øF (36.3 øC)   Ht 167.6 cm (66\")   Wt 128 kg (281 lb 12.8 oz)   SpO2 95%   BMI 45.48 kg/mý          Physical Exam:    Physical Exam  Vitals reviewed.   Constitutional:       Appearance: She is obese.   Cardiovascular:      Rate and Rhythm: Normal rate and regular rhythm.   Pulmonary:      Effort: Pulmonary effort is normal.   Abdominal:      General: Bowel sounds are normal.      Palpations: Abdomen is soft.   Musculoskeletal:         General: Normal range of motion.   Skin:     General: Skin is warm and dry.   Neurological:      Mental Status: She is alert and oriented to person, place, and time.   Psychiatric:         Mood and Affect: Mood normal.         Behavior: Behavior normal.         Results Review:    None    Medication Reviewed:   Current Outpatient Medications   Medication Sig Dispense Refill    " busPIRone (BUSPAR) 7.5 MG tablet Take 1 tablet by mouth 3 (Three) Times a Day As Needed (anxiety). 90 tablet 0    Calcium Citrate-Vitamin D (CALCIUM CITRATE CHEWY BITE PO) Take 1 dose by mouth 3 (Three) Times a Day.      clotrimazole-betamethasone (LOTRISONE) 1-0.05 % cream Apply 1 application  topically to the appropriate area as directed 2 (Two) Times a Day.      cyanocobalamin 1000 MCG/ML injection Inject 1 mL into the appropriate muscle as directed by prescriber 1 (One) Time Per Week. VITAMIN B12 10 mL 1    losartan (COZAAR) 100 MG tablet Take 1 tablet by mouth Daily. 90 tablet 3    montelukast (SINGULAIR) 10 MG tablet Take 1 tablet by mouth Every Night.      Multiple Vitamins-Minerals (BARIATRIC MULTIVITAMINS/IRON PO) Take 1 tablet by mouth Daily.      Probiotic Product (FloraVantage) capsule Take 1 each by mouth Daily. CHEWABLE      sertraline (ZOLOFT) 100 MG tablet Take 0.5 tablets by mouth Daily.      vitamin D (ERGOCALCIFEROL) 1.25 MG (37944 UT) capsule capsule Take 1 capsule by mouth 1 (One) Time Per Week. (Patient taking differently: Take 1 capsule by mouth 1 (One) Time Per Week. SUNDAY) 12 capsule 3    cyclobenzaprine (FLEXERIL) 10 MG tablet Take 1 tablet by mouth 3 (Three) Times a Day As Needed for Muscle Spasms. (Patient not taking: Reported on 8/8/2023) 30 tablet 0    ondansetron ODT (ZOFRAN-ODT) 4 MG disintegrating tablet Place 1 tablet on the tongue Every 8 (Eight) Hours As Needed for Nausea or Vomiting. (Patient not taking: Reported on 8/8/2023) 30 tablet 1     No current facility-administered medications for this visit.       Assessment & Plan    POD  3 months  from Sleeve Gastrectomy.  Diagnoses and all orders for this visit:    1. Status post laparoscopic sleeve gastrectomy (Primary)  Comments:  Continue multivitamin patch    2. Class 3 severe obesity due to excess calories with serious comorbidity and body mass index (BMI) of 45.0 to 49.9 in adult  Assessment & Plan:  Patient's (Body mass index  is 45.48 kg/mý.) indicates that they are morbidly/severely obese (BMI > 40 or > 35 with obesity - related health condition) with health conditions that include hypertension . Weight is improving with treatment. BMI  is above average; BMI management plan is completed. We discussed portion control and increasing exercise.       3. Primary hypertension  Comments:  Mild elevation today, advised to monitor glucose levels today    4. Gastroesophageal reflux disease without esophagitis  Comments:  States this is much improved, no symptoms.           Allison Mckeon and I discussed the importance of changing behavior for consistent and successful weight loss. We first reviewed again the definition of a meal which is defined as portion sizes less than a half a cup and those portions incorporating a protein. Specifically the protein should fill half of that volume. I also explained that she should attempt to consume 4 meals as defined above daily and to avoid snacking or grazing. She should also be mindful of adequate hydration by consuming at least 64 oz of water daily and incorporation of a regular exercise regimen.     I discussed the importance of taking her multivitamins as directed.  She is to return to our office 3 months or as needed.      EZIO Montanez  08/08/23  10:58 CDT

## 2023-08-30 DIAGNOSIS — D51.0 PERNICIOUS ANEMIA: ICD-10-CM

## 2023-08-30 RX ORDER — CYANOCOBALAMIN 1000 UG/ML
INJECTION, SOLUTION INTRAMUSCULAR; SUBCUTANEOUS
Qty: 10 ML | Refills: 4 | OUTPATIENT
Start: 2023-08-30

## 2023-09-08 DIAGNOSIS — D51.0 PERNICIOUS ANEMIA: ICD-10-CM

## 2023-09-08 RX ORDER — CLOTRIMAZOLE AND BETAMETHASONE DIPROPIONATE 10; .64 MG/G; MG/G
1 CREAM TOPICAL 2 TIMES DAILY
Qty: 45 G | Refills: 1 | Status: SHIPPED | OUTPATIENT
Start: 2023-09-08

## 2023-09-11 RX ORDER — CLOTRIMAZOLE AND BETAMETHASONE DIPROPIONATE 10; .64 MG/G; MG/G
1 CREAM TOPICAL 2 TIMES DAILY
OUTPATIENT
Start: 2023-09-11

## 2023-09-11 RX ORDER — ERGOCALCIFEROL 1.25 MG/1
50000 CAPSULE ORAL WEEKLY
Qty: 12 CAPSULE | Refills: 3 | Status: SHIPPED | OUTPATIENT
Start: 2023-09-11

## 2023-09-11 RX ORDER — CYANOCOBALAMIN 1000 UG/ML
1000 INJECTION, SOLUTION INTRAMUSCULAR; SUBCUTANEOUS
Qty: 3 ML | Refills: 1 | Status: SHIPPED | OUTPATIENT
Start: 2023-09-11

## 2023-09-18 ENCOUNTER — OFFICE VISIT (OUTPATIENT)
Dept: OBSTETRICS AND GYNECOLOGY | Facility: CLINIC | Age: 43
End: 2023-09-18
Payer: COMMERCIAL

## 2023-09-18 VITALS
WEIGHT: 262 LBS | SYSTOLIC BLOOD PRESSURE: 118 MMHG | HEIGHT: 66 IN | DIASTOLIC BLOOD PRESSURE: 86 MMHG | BODY MASS INDEX: 42.11 KG/M2

## 2023-09-18 DIAGNOSIS — Z80.9 FAMILY HISTORY OF CANCER: ICD-10-CM

## 2023-09-18 DIAGNOSIS — Z01.419 ENCOUNTER FOR GYNECOLOGICAL EXAMINATION WITHOUT ABNORMAL FINDING: Primary | ICD-10-CM

## 2023-09-18 DIAGNOSIS — N95.2 ATROPHIC VAGINITIS: ICD-10-CM

## 2023-09-18 RX ORDER — CONJUGATED ESTROGENS 0.62 MG/G
CREAM VAGINAL
Qty: 30 G | Refills: 0 | Status: SHIPPED | OUTPATIENT
Start: 2023-09-18

## 2023-09-18 NOTE — PROGRESS NOTES
Chief Complaint  Annual Exam (Pt here today as new pt for annual exam. Pt voices having vaginal dryness, hot flashes and night sweats. Pt due for mammogram. Pt voices no other concerns. )    Subjective          Allison Mckeon presents to [facility]    History of Present Illness  The patient presents for annual examination.    The patient reports that she has started to experience a significant amount of vaginal dryness.  She also had decreased sex drive.    The patient had a hysterectomy in 2019.  She did not have her ovaries removed at that time.  She had a significant amount of bleeding during the hysterectomy, so her ovaries were not removed.  Her tubes were also unable to be removed.  She had a hysterectomy due to bleeding.      The patient states that the vaginal dryness and night sweats are new.  She noticed that the vaginal dryness and the decreased sex drive just happened since last year (2022), which she has been dealing with right now.  She reports that she is actually having night sweats in the last 2 months.    The patient reports that she started being on blood pressure medication in 04/2020.  She notes that her blood pressure has been stable and low in part due to having a gastric sleeve surgery in 05/2023 and lost 80 pounds.  She attributes her abnormal blood pressures earlier this year in 02/2023 and 03/2023 to stress and anxiety.  Even when she lost 40 pounds, her blood pressure has been going up and down.  Her primary care physician prescribed her buspirone, which she used to take daily and has not needed to take it in a while.    The patient does not have a personal history for breast cancer.  She is interested in doing the genetic testing.  She started having mammograms at age 33.  She had fibrocystic breasts.    Review of Systems   Constitutional:  Negative for activity change, appetite change, fatigue and fever.   HENT:  Negative for congestion, sore throat and trouble  "swallowing.    Eyes:  Negative for pain, discharge and visual disturbance.   Respiratory:  Negative for apnea, shortness of breath and wheezing.    Cardiovascular:  Negative for chest pain, palpitations and leg swelling.   Gastrointestinal:  Negative for abdominal pain, constipation and diarrhea.   Endocrine:        Night sweats   Genitourinary:  Negative for frequency, pelvic pain, urgency and vaginal discharge.        Vaginal dryness     Musculoskeletal:  Negative for back pain and gait problem.   Skin:  Negative for color change and rash.   Neurological:  Negative for dizziness, weakness and numbness.   Psychiatric/Behavioral:  Negative for confusion and sleep disturbance.       Objective   Vital Signs:   /86   Ht 167.6 cm (66\")   Wt 119 kg (262 lb)   BMI 42.29 kg/m²     Physical Exam  Vitals and nursing note reviewed. Exam conducted with a chaperone present.   Constitutional:       Appearance: She is well-developed.   HENT:      Head: Normocephalic and atraumatic.      Right Ear: External ear normal.      Left Ear: External ear normal.   Eyes:      General: No scleral icterus.        Right eye: No discharge.         Left eye: No discharge.      Conjunctiva/sclera: Conjunctivae normal.   Neck:      Thyroid: No thyromegaly.      Vascular: No carotid bruit.   Cardiovascular:      Rate and Rhythm: Regular rhythm.      Heart sounds: Normal heart sounds. No murmur heard.  Pulmonary:      Effort: Pulmonary effort is normal. No respiratory distress.      Breath sounds: Normal breath sounds.   Chest:   Breasts:     Breasts are symmetrical.      Right: No inverted nipple, mass, nipple discharge, skin change or tenderness.      Left: No inverted nipple, mass, nipple discharge, skin change or tenderness.   Abdominal:      General: Bowel sounds are normal. There is no distension.      Palpations: Abdomen is soft. There is no mass.      Tenderness: There is no abdominal tenderness. There is no guarding.      Hernia: " No hernia is present. There is no hernia in the left inguinal area.   Genitourinary:     Exam position: Lithotomy position.      Labia:         Right: No rash, tenderness, lesion or injury.         Left: No rash, tenderness, lesion or injury.       Vagina: Normal. No signs of injury. No vaginal discharge, erythema or bleeding.      Rectum: No mass.      Comments: Cervix, Uterus surgically absent  Urethra and urethral meatus normal  Bladder, normal no prolapse  Perineum and anus examined and without lesions  Musculoskeletal:         General: No tenderness. Normal range of motion.      Cervical back: Normal range of motion and neck supple.   Lymphadenopathy:      Head:      Right side of head: No submental, submandibular, tonsillar, preauricular, posterior auricular or occipital adenopathy.      Left side of head: No submental, submandibular, tonsillar, preauricular, posterior auricular or occipital adenopathy.      Cervical: No cervical adenopathy.      Right cervical: No superficial, deep or posterior cervical adenopathy.     Left cervical: No superficial, deep or posterior cervical adenopathy.   Skin:     General: Skin is warm and dry.      Findings: No bruising, erythema or rash.   Neurological:      Mental Status: She is alert and oriented to person, place, and time.      Motor: No abnormal muscle tone.      Coordination: Coordination normal.   Psychiatric:         Behavior: Behavior normal.         Thought Content: Thought content normal.         Judgment: Judgment normal.       Result Review :                     Assessment and Plan      Well woman GYN exam.   Pap smear not indicated per ASCCP guidelines.   Will have lab work at PCP.     Encouraged SBE, pt is aware how to do self breast exam and the importance of same.   Discussed weight management and importance of maintaining a healthy weight.   Discussed Vitamin D intake and the importance of adequate vitamin D for both Bone Health and a healthy immune  system.    Discussed Daily exercise and the importance of same, in regards to a healthy heart as well as helping to maintain her weight and improving her mental health.     Colonoscopy is up to date    Discussed STD prevention and testing.   Pt declines Chlamydia/Gonorrhea/Trichomonas, RPR, Hep panel and HIV testing.     Mammogram will be scheduled at [facility].     Genetic counseling ordered.     Discussed pt menopausal symptoms.   Pt opts to start with vaginal dryness.   Premarin vaginal cream sent to pharmacy.       Diagnoses and all orders for this visit:    1. Encounter for gynecological examination without abnormal finding (Primary)    2. Family history of cancer  -     Ambulatory Referral to Genetic Counseling/Testing    3. Atrophic vaginitis    Other orders  -     Estrogens Conjugated (Premarin) 0.625 MG/GM vaginal cream; 0.5 g inserted vaginally, twice weekly.  Dispense: 30 g; Refill: 0                  Follow Up   Return in about 3 months (around 12/18/2023) for Video visit, med check.    Patient was given instructions and counseling regarding her condition or for health maintenance advice. Please see specific information pulled into the AVS if appropriate.       Transcribed from ambient dictation for EZIO Sequeira by Shantell Alvarado.  09/18/23   16:33 CDT    Patient or patient representative verbalized consent to the visit recording.  I have personally performed the services described in this document as transcribed by the above individual, and it is both accurate and complete.

## 2023-09-18 NOTE — PATIENT INSTRUCTIONS

## 2023-09-19 RX ORDER — SERTRALINE HYDROCHLORIDE 100 MG/1
100 TABLET, FILM COATED ORAL DAILY
Qty: 90 TABLET | Refills: 3 | Status: SHIPPED | OUTPATIENT
Start: 2023-09-19

## 2023-11-01 ENCOUNTER — OFFICE VISIT (OUTPATIENT)
Dept: INTERNAL MEDICINE | Facility: CLINIC | Age: 43
End: 2023-11-01
Payer: COMMERCIAL

## 2023-11-01 VITALS
DIASTOLIC BLOOD PRESSURE: 80 MMHG | BODY MASS INDEX: 41.85 KG/M2 | HEIGHT: 66 IN | HEART RATE: 81 BPM | WEIGHT: 260.4 LBS | SYSTOLIC BLOOD PRESSURE: 118 MMHG | OXYGEN SATURATION: 96 % | TEMPERATURE: 97.3 F

## 2023-11-01 DIAGNOSIS — Z23 NEED FOR INFLUENZA VACCINATION: Primary | ICD-10-CM

## 2023-11-01 DIAGNOSIS — D51.0 PERNICIOUS ANEMIA: ICD-10-CM

## 2023-11-01 DIAGNOSIS — R00.2 PALPITATIONS: ICD-10-CM

## 2023-11-01 DIAGNOSIS — Z86.69 HISTORY OF MIGRAINE: ICD-10-CM

## 2023-11-01 DIAGNOSIS — F41.9 ANXIETY: ICD-10-CM

## 2023-11-01 DIAGNOSIS — I10 PRIMARY HYPERTENSION: ICD-10-CM

## 2023-11-01 DIAGNOSIS — Z23 NEED FOR COVID-19 VACCINE: ICD-10-CM

## 2023-11-01 RX ORDER — BUTALBITAL, ACETAMINOPHEN AND CAFFEINE 50; 325; 40 MG/1; MG/1; MG/1
1 TABLET ORAL EVERY 6 HOURS PRN
Qty: 30 TABLET | Refills: 0 | Status: SHIPPED | OUTPATIENT
Start: 2023-11-01

## 2023-11-01 RX ORDER — BUSPIRONE HYDROCHLORIDE 7.5 MG/1
7.5 TABLET ORAL 3 TIMES DAILY PRN
Qty: 90 TABLET | Refills: 2 | Status: SHIPPED | OUTPATIENT
Start: 2023-11-01

## 2023-11-01 RX ORDER — LOSARTAN POTASSIUM 100 MG/1
50 TABLET ORAL DAILY
Start: 2023-11-01 | End: 2023-11-01 | Stop reason: SDUPTHER

## 2023-11-01 RX ORDER — CLOTRIMAZOLE AND BETAMETHASONE DIPROPIONATE 10; .64 MG/G; MG/G
1 CREAM TOPICAL 2 TIMES DAILY
Qty: 90 G | Refills: 2 | Status: SHIPPED | OUTPATIENT
Start: 2023-11-01

## 2023-11-01 RX ORDER — SERTRALINE HYDROCHLORIDE 100 MG/1
100 TABLET, FILM COATED ORAL DAILY
Qty: 90 TABLET | Refills: 3 | Status: SHIPPED | OUTPATIENT
Start: 2023-11-01

## 2023-11-01 RX ORDER — CYANOCOBALAMIN 1000 UG/ML
1000 INJECTION, SOLUTION INTRAMUSCULAR; SUBCUTANEOUS
Qty: 3 ML | Refills: 2 | Status: SHIPPED | OUTPATIENT
Start: 2023-11-01

## 2023-11-01 RX ORDER — ERGOCALCIFEROL 1.25 MG/1
50000 CAPSULE ORAL WEEKLY
Qty: 12 CAPSULE | Refills: 3 | Status: SHIPPED | OUTPATIENT
Start: 2023-11-01

## 2023-11-01 RX ORDER — LOSARTAN POTASSIUM 50 MG/1
50 TABLET ORAL DAILY
Qty: 90 TABLET | Refills: 3 | Status: SHIPPED | OUTPATIENT
Start: 2023-11-01

## 2023-11-02 LAB
DHEA-S SERPL-MCNC: 100 UG/DL (ref 41.2–243.7)
ESTROGEN SERPL-MCNC: 244 PG/ML
PROGEST SERPL-MCNC: 0.3 NG/ML
TESTOST SERPL-MCNC: 5 NG/DL (ref 4–50)
TSH SERPL DL<=0.005 MIU/L-ACNC: 1.54 UIU/ML (ref 0.27–4.2)

## 2023-11-02 NOTE — PROGRESS NOTES
Subjective     Chief Complaint:  Headaches    HPI:  Patient presents to the office today for a follow-up.  She was last seen on  for a follow-up.  She is status post gastric sleeve in May and has continued to do well with her weight loss.  She indicates she is feeling pretty good overall.  During her last office visit with me in July, she was complaining of a dull, persistent headache.  Please see assessment and plan below.    Patient's PMR from outside medical facility reviewed and noted.    Past Medical History:   Past Medical History:   Diagnosis Date    ADHD (attention deficit hyperactivity disorder)     Allergic     Avocado; Anaphylaxis    Allergic rhinitis     Anxiety     Asthma     Cholelithiasis     Colon polyp     Depression     Disease of thyroid gland     Hashimotos    Fibromyalgia, primary     GERD (gastroesophageal reflux disease)     Glaucoma     Elevated Pressure    Headache     Hernia     Hypertension     Obesity     Personal history of COVID-19 2023    PONV (postoperative nausea and vomiting)     Sleep apnea     Does Not use CPAP     Past Surgical History:  Past Surgical History:   Procedure Laterality Date    ADENOIDECTOMY       SECTION      CHOLECYSTECTOMY      COLONOSCOPY      D & C WITH SUCTION      x2    ENDOMETRIAL ABLATION  2015    ENDOSCOPY N/A 2023    Procedure: ESOPHAGOGASTRODUODENOSCOPY WITH ANESTHESIA;  Surgeon: Binh Michele MD;  Location: [facility] ENDOSCOPY;  Service: General;  Laterality: N/A;  pre screen  post esophagitis       GASTRIC SLEEVE LAPAROSCOPIC N/A 2023    Procedure: GASTRIC SLEEVE LAPAROSCOPIC AND HIATAL HERNIA REPAIR WITH DAVINCI ROBOT;  Surgeon: Binh Michele MD;  Location: [facility] OR;  Service: Robotics - DaVinci;  Laterality: N/A;    SINUS SURGERY  2018    septoplasty with turbinates    TONSILLECTOMY      TUBAL ABDOMINAL LIGATION      TUBAL ABDOMINAL LIGATION       tubal reversal 2010    VAGINAL HYSTERECTOMY      still has tubes and ovaries     Social History:  reports that she has never smoked. She has never used smokeless tobacco. She reports current alcohol use of about 2.0 standard drinks of alcohol per week. She reports that she does not use drugs.    Family History: family history includes Asthma in her mother; Breast cancer (age of onset: 37) in her maternal aunt; Cancer in her maternal grandfather, maternal grandmother, and paternal grandmother; Colon cancer in her maternal grandmother; Depression in her father; Diabetes in her paternal grandmother; Early death in her paternal aunt and paternal grandfather; Heart disease in her maternal grandmother; Learning disabilities in her father and son; Lung cancer in her maternal grandmother; Mental illness in her father; Pancreatic cancer in her maternal grandfather; Thyroid disease in her maternal grandfather and mother; Vision loss in her paternal grandmother. She was adopted.      Allergies:  Allergies   Allergen Reactions    Avocado Anaphylaxis    Prilosec [Omeprazole] Rash, Other (See Comments) and GI Intolerance     Achy joints    Promethazine Itching      - PHENERGAN -      Medications:  Prior to Admission medications    Medication Sig Start Date End Date Taking? Authorizing Provider   busPIRone (BUSPAR) 7.5 MG tablet Take 1 tablet by mouth 3 (Three) Times a Day As Needed (anxiety). 11/1/23  Yes Wendy Andrade APRN   Calcium Citrate-Vitamin D (CALCIUM CITRATE CHEWY BITE PO) Take 1 dose by mouth 3 (Three) Times a Day.   Yes Provider, MD Aníbal   clotrimazole-betamethasone (LOTRISONE) 1-0.05 % cream Apply 1 application  topically to the appropriate area as directed 2 (Two) Times a Day. 11/1/23  Yes Wendy Andrade APRN   cyanocobalamin 1000 MCG/ML injection Inject 1 mL into the appropriate muscle as directed by prescriber Every 30 (Thirty) Days. VITAMIN B12 11/1/23  Yes Wendy Andrade APRN  "  Estrogens Conjugated (Premarin) 0.625 MG/GM vaginal cream 0.5 g inserted vaginally, twice weekly. 9/18/23  Yes Lucina Worrell APRN   losartan (COZAAR) 50 MG tablet Take 1 tablet by mouth Daily. 11/1/23  Yes Wendy Andrade APRN   montelukast (SINGULAIR) 10 MG tablet Take 1 tablet by mouth Every Night.   Yes Provider, MD Aníbal   Multiple Vitamins-Minerals (BARIATRIC MULTIVITAMINS/IRON PO) Take 1 tablet by mouth Daily.   Yes Provider, MD Aníbal   Probiotic Product (FloraVantage) capsule Take 1 each by mouth Daily. CHEWABLE   Yes ProviderAníbal MD   sertraline (ZOLOFT) 100 MG tablet Take 1 tablet by mouth Daily. 11/1/23  Yes Wendy Andrade APRN   vitamin D (ERGOCALCIFEROL) 1.25 MG (81385 UT) capsule capsule Take 1 capsule by mouth 1 (One) Time Per Week. 11/1/23  Yes Wendy Andrade APRN   butalbital-acetaminophen-caffeine (FIORICET, ESGIC) -40 MG per tablet Take 1 tablet by mouth Every 6 (Six) Hours As Needed for Migraine. 11/1/23   Wendy Andrade APRN       Objective     Vital Signs: /80 (BP Location: Right arm, Patient Position: Sitting, Cuff Size: Adult)   Pulse 81   Temp 97.3 °F (36.3 °C) (Temporal)   Ht 167.6 cm (66\")   Wt 118 kg (260 lb 6.4 oz)   SpO2 96%   BMI 42.03 kg/m²   Physical Exam  Vitals and nursing note reviewed.   Constitutional:       General: She is not in acute distress.     Appearance: She is obese. She is not ill-appearing or toxic-appearing.   HENT:      Head: Normocephalic and atraumatic.      Mouth/Throat:      Mouth: Mucous membranes are moist.      Pharynx: Oropharynx is clear.   Cardiovascular:      Rate and Rhythm: Normal rate and regular rhythm.      Pulses: Normal pulses.      Heart sounds: Normal heart sounds.   Pulmonary:      Effort: Pulmonary effort is normal.      Breath sounds: No wheezing, rhonchi or rales.   Abdominal:      General: Bowel sounds are normal. There is no distension.      Palpations: Abdomen is soft.      " Tenderness: There is no abdominal tenderness.   Musculoskeletal:         General: No swelling or tenderness. Normal range of motion.      Cervical back: Normal range of motion and neck supple. No tenderness.   Skin:     General: Skin is warm and dry.      Findings: No erythema or rash.   Neurological:      General: No focal deficit present.      Mental Status: She is alert and oriented to person, place, and time.   Psychiatric:         Mood and Affect: Mood normal.         Behavior: Behavior normal.         Thought Content: Thought content normal.         Judgment: Judgment normal.       Results Reviewed:  Office Visit with Lucina Worrell APRN (09/18/2023)   Office Visit with Manda Benton APRN (08/08/2023)   Assessment / Plan     Assessment/Plan:  Diagnoses and all orders for this visit:    1. Need for influenza vaccination (Primary)  -     Fluzone (or Fluarix & Flulaval for VFC) >6mos    2. Need for COVID-19 vaccine    3. Palpitations  -     TSH Rfx On Abnormal To Free T4  -     Estrogens, Total  -     Progesterone  -     Testosterone  -     DHEA-Sulfate    4. Primary hypertension  -     Discontinue: losartan (COZAAR) 100 MG tablet; Take 0.5 tablets by mouth Daily.  -     losartan (COZAAR) 50 MG tablet; Take 1 tablet by mouth Daily.  Dispense: 90 tablet; Refill: 3    5. Anxiety  -     busPIRone (BUSPAR) 7.5 MG tablet; Take 1 tablet by mouth 3 (Three) Times a Day As Needed (anxiety).  Dispense: 90 tablet; Refill: 2  -     sertraline (ZOLOFT) 100 MG tablet; Take 1 tablet by mouth Daily.  Dispense: 90 tablet; Refill: 3    6. Pernicious anemia  -     cyanocobalamin 1000 MCG/ML injection; Inject 1 mL into the appropriate muscle as directed by prescriber Every 30 (Thirty) Days. VITAMIN B12  Dispense: 3 mL; Refill: 2    7. History of migraine  -     butalbital-acetaminophen-caffeine (FIORICET, ESGIC) -40 MG per tablet; Take 1 tablet by mouth Every 6 (Six) Hours As Needed for Migraine.  Dispense: 30  tablet; Refill: 0    Other orders  -     clotrimazole-betamethasone (LOTRISONE) 1-0.05 % cream; Apply 1 application  topically to the appropriate area as directed 2 (Two) Times a Day.  Dispense: 90 g; Refill: 2  -     vitamin D (ERGOCALCIFEROL) 1.25 MG (49686 UT) capsule capsule; Take 1 capsule by mouth 1 (One) Time Per Week.  Dispense: 12 capsule; Refill: 3       Patient presents to the office today for follow-up.  She was last seen in July and is status post gastric sleeve in May.  She has continued to do well with her weight loss and overall is feeling pretty good. .  She had had great difficulty controlling her blood pressure in April of this year, even requiring visits to the emergency department for markedly elevated blood pressures causing symptoms of headache, dizziness, nausea and confusion.  We had continued her medication losartan and had placed her on Coreg as she had been having elevated heart rates as well.  This regimen seemed to work well for her.  At her last office visit in July, she had stopped her Coreg due to her heart rate dropping low and feelings of fatigue.  Her blood pressure is well controlled today at 118/80.  She indicates this is about how it has been running at home as well.  She is not having any chest pain or shortness of breath.  She does inquire about possibly lowering her losartan dosage and I think this is reasonable, so we will lower from 100 mg to 50 mg.  I have asked her to continue to monitor her blood pressure at home and she will let me know if her blood pressure is running above goal of 130/80.  My hope is that as she continues with weight loss we may be able to take her off of antihypertensives altogether.    The patient does note that her heart rate is sometimes elevated, and she especially notes this when she gets anxious but this does not always occur with being anxious.  This typically lasts for a few seconds.  She indicates that she used to have anxiety attacks in  the past and an elevated heart rate is typically how those always started.  She is using BuSpar as needed for anxiety attacks, which does seem to help.  I would like to check TSH and some hormone labs to assess for any underlying cause of these palpitations, that may be anxiety driven.  This is happening 2-3 times per month.  If this does start happening more often or patient notices any changes with this, I feel that it would be reasonable to obtain a Holter monitor.    The patient continues to have dull headaches and indicates that she does have a previous history of migraines.  She has only had 2 migraines in the last 6 months.  With her typical headache she does not only have associated photophobia/phonophobia, numbness/tingling, nausea/vomiting.  She has been taking Tylenol, which has not been very effective for her.  At her last office visit in July, we had checked several vitamin levels to assess for any underlying cause of headache, which were all normal.  I will give her a prescription for Fioricet to use on an as-needed basis sparingly, but if her headaches persist it may be reasonable to obtain imaging.    Return in about 4 months (around 3/4/2024) for Annual physical. unless patient needs to be seen sooner or acute issues arise.    I have discussed the patient results/orders and and plan/recommendation with them at today's visit.      EZIO Hardin   11/01/2023        Answers submitted by the patient for this visit:  Other (Submitted on 11/1/2023)  Please describe your symptoms.: Follow up  Have you had these symptoms before?: No  How long have you been having these symptoms?: Greater than 2 weeks  Primary Reason for Visit (Submitted on 11/1/2023)  What is the primary reason for your visit?: Other

## 2023-11-02 NOTE — PROGRESS NOTES
Normal thyroid function and hormone levels, so I do not feel that this would be contributory to her feelings of palpitations.  Please have her continue to monitor these and let me know if her symptoms worsen.

## 2023-11-06 ENCOUNTER — OFFICE VISIT (OUTPATIENT)
Dept: BARIATRICS/WEIGHT MGMT | Facility: CLINIC | Age: 43
End: 2023-11-06
Payer: COMMERCIAL

## 2023-11-06 ENCOUNTER — LAB (OUTPATIENT)
Dept: LAB | Facility: HOSPITAL | Age: 43
End: 2023-11-06
Payer: COMMERCIAL

## 2023-11-06 VITALS
HEIGHT: 66 IN | OXYGEN SATURATION: 98 % | SYSTOLIC BLOOD PRESSURE: 130 MMHG | DIASTOLIC BLOOD PRESSURE: 85 MMHG | TEMPERATURE: 98 F | WEIGHT: 259 LBS | BODY MASS INDEX: 41.62 KG/M2 | HEART RATE: 82 BPM

## 2023-11-06 DIAGNOSIS — E55.9 VITAMIN D DEFICIENCY: ICD-10-CM

## 2023-11-06 DIAGNOSIS — Z13.21 SCREENING FOR MALNUTRITION: ICD-10-CM

## 2023-11-06 DIAGNOSIS — E66.01 CLASS 3 SEVERE OBESITY DUE TO EXCESS CALORIES WITH SERIOUS COMORBIDITY AND BODY MASS INDEX (BMI) OF 45.0 TO 49.9 IN ADULT: ICD-10-CM

## 2023-11-06 DIAGNOSIS — Z98.84 STATUS POST LAPAROSCOPIC SLEEVE GASTRECTOMY: ICD-10-CM

## 2023-11-06 DIAGNOSIS — Z98.84 STATUS POST LAPAROSCOPIC SLEEVE GASTRECTOMY: Primary | ICD-10-CM

## 2023-11-06 LAB
25(OH)D3 SERPL-MCNC: 44.7 NG/ML (ref 30–100)
ALBUMIN SERPL-MCNC: 4.5 G/DL (ref 3.5–5.2)
ALBUMIN/GLOB SERPL: 1.9 G/DL
ALP SERPL-CCNC: 64 U/L (ref 39–117)
ALT SERPL W P-5'-P-CCNC: 17 U/L (ref 1–33)
ANION GAP SERPL CALCULATED.3IONS-SCNC: 8 MMOL/L (ref 5–15)
AST SERPL-CCNC: 16 U/L (ref 1–32)
BASOPHILS # BLD AUTO: 0.03 10*3/MM3 (ref 0–0.2)
BASOPHILS NFR BLD AUTO: 0.4 % (ref 0–1.5)
BILIRUB SERPL-MCNC: 0.4 MG/DL (ref 0–1.2)
BUN SERPL-MCNC: 16 MG/DL (ref 6–20)
BUN/CREAT SERPL: 25.8 (ref 7–25)
CALCIUM SPEC-SCNC: 9.3 MG/DL (ref 8.6–10.5)
CHLORIDE SERPL-SCNC: 103 MMOL/L (ref 98–107)
CO2 SERPL-SCNC: 29 MMOL/L (ref 22–29)
CREAT SERPL-MCNC: 0.62 MG/DL (ref 0.57–1)
DEPRECATED RDW RBC AUTO: 42.9 FL (ref 37–54)
EGFRCR SERPLBLD CKD-EPI 2021: 112.1 ML/MIN/1.73
EOSINOPHIL # BLD AUTO: 0.1 10*3/MM3 (ref 0–0.4)
EOSINOPHIL NFR BLD AUTO: 1.3 % (ref 0.3–6.2)
ERYTHROCYTE [DISTWIDTH] IN BLOOD BY AUTOMATED COUNT: 13.5 % (ref 12.3–15.4)
FOLATE SERPL-MCNC: 12.1 NG/ML (ref 4.78–24.2)
GLOBULIN UR ELPH-MCNC: 2.4 GM/DL
GLUCOSE SERPL-MCNC: 97 MG/DL (ref 65–99)
HCT VFR BLD AUTO: 44.1 % (ref 34–46.6)
HGB BLD-MCNC: 14 G/DL (ref 12–15.9)
IMM GRANULOCYTES # BLD AUTO: 0.02 10*3/MM3 (ref 0–0.05)
IMM GRANULOCYTES NFR BLD AUTO: 0.3 % (ref 0–0.5)
IRON 24H UR-MRATE: 73 MCG/DL (ref 37–145)
LYMPHOCYTES # BLD AUTO: 2.38 10*3/MM3 (ref 0.7–3.1)
LYMPHOCYTES NFR BLD AUTO: 30.2 % (ref 19.6–45.3)
MCH RBC QN AUTO: 27.8 PG (ref 26.6–33)
MCHC RBC AUTO-ENTMCNC: 31.7 G/DL (ref 31.5–35.7)
MCV RBC AUTO: 87.7 FL (ref 79–97)
MONOCYTES # BLD AUTO: 0.61 10*3/MM3 (ref 0.1–0.9)
MONOCYTES NFR BLD AUTO: 7.7 % (ref 5–12)
NEUTROPHILS NFR BLD AUTO: 4.75 10*3/MM3 (ref 1.7–7)
NEUTROPHILS NFR BLD AUTO: 60.1 % (ref 42.7–76)
NRBC BLD AUTO-RTO: 0 /100 WBC (ref 0–0.2)
PLATELET # BLD AUTO: 291 10*3/MM3 (ref 140–450)
PMV BLD AUTO: 9.5 FL (ref 6–12)
POTASSIUM SERPL-SCNC: 4 MMOL/L (ref 3.5–5.2)
PROT SERPL-MCNC: 6.9 G/DL (ref 6–8.5)
RBC # BLD AUTO: 5.03 10*6/MM3 (ref 3.77–5.28)
SODIUM SERPL-SCNC: 140 MMOL/L (ref 136–145)
VIT B12 BLD-MCNC: 563 PG/ML (ref 211–946)
WBC NRBC COR # BLD: 7.89 10*3/MM3 (ref 3.4–10.8)

## 2023-11-06 PROCEDURE — 84207 ASSAY OF VITAMIN B-6: CPT

## 2023-11-06 PROCEDURE — 82306 VITAMIN D 25 HYDROXY: CPT

## 2023-11-06 PROCEDURE — 85025 COMPLETE CBC W/AUTO DIFF WBC: CPT

## 2023-11-06 PROCEDURE — 83540 ASSAY OF IRON: CPT

## 2023-11-06 PROCEDURE — 36415 COLL VENOUS BLD VENIPUNCTURE: CPT

## 2023-11-06 PROCEDURE — 84446 ASSAY OF VITAMIN E: CPT

## 2023-11-06 PROCEDURE — 80053 COMPREHEN METABOLIC PANEL: CPT

## 2023-11-06 PROCEDURE — 84425 ASSAY OF VITAMIN B-1: CPT

## 2023-11-06 PROCEDURE — 84630 ASSAY OF ZINC: CPT

## 2023-11-06 PROCEDURE — 84590 ASSAY OF VITAMIN A: CPT

## 2023-11-06 PROCEDURE — 82746 ASSAY OF FOLIC ACID SERUM: CPT

## 2023-11-06 PROCEDURE — 82525 ASSAY OF COPPER: CPT

## 2023-11-06 PROCEDURE — 82607 VITAMIN B-12: CPT

## 2023-11-06 NOTE — ASSESSMENT & PLAN NOTE
Patient's (Body mass index is 41.8 kg/m².) indicates that they are morbidly/severely obese (BMI > 40 or > 35 with obesity - related health condition) with health conditions that include hypertension and GERD . Weight is unchanged. BMI  is above average; BMI management plan is completed. We discussed portion control and increasing exercise.

## 2023-11-06 NOTE — PROGRESS NOTES
"Patient Care Team:  Wendy Andrade APRN as PCP - General (Nurse Practitioner)    Chief Complaint: S/P Sleeve Gastrectomy, 6 months     Subjective     Allison Mckeon is POD 6 months from a sleeve gastrectomy. Patient admits to eating around 4 meals per day and denies to grazing in between meals. She admits to drinking at least 64 ounces or more of fluid each day and intaking at least 65 grams of protein.. She is currently exercising: by walking.. She states that she has gone without soda intake and denies  nicotine use. Patient has lost 3  pound since her last appointment with us.       Review of Systems:     Review of Systems   Constitutional: Negative.    Respiratory: Negative.     Cardiovascular: Negative.    Gastrointestinal: Negative.    Endocrine: Negative.    Musculoskeletal: Negative.    Psychiatric/Behavioral: Negative.          Objective     Vital Signs  /85 (BP Location: Right arm, Patient Position: Sitting, Cuff Size: Adult)   Pulse 82   Temp 98 °F (36.7 °C)   Ht 167.6 cm (66\")   Wt 117 kg (259 lb)   SpO2 98%   BMI 41.80 kg/m²          Physical Exam:  Physical Exam  Vitals reviewed.   Constitutional:       Appearance: She is obese.   Cardiovascular:      Rate and Rhythm: Normal rate and regular rhythm.   Pulmonary:      Effort: Pulmonary effort is normal.   Abdominal:      General: Bowel sounds are normal.      Palpations: Abdomen is soft.   Musculoskeletal:         General: Normal range of motion.   Skin:     General: Skin is warm and dry.   Neurological:      Mental Status: She is alert and oriented to person, place, and time.   Psychiatric:         Mood and Affect: Mood normal.         Behavior: Behavior normal.           Results Review:    Labs reviewed    Medication Reviewed:   Current Outpatient Medications   Medication Sig Dispense Refill    busPIRone (BUSPAR) 7.5 MG tablet Take 1 tablet by mouth 3 (Three) Times a Day As Needed (anxiety). 90 tablet 2    " butalbital-acetaminophen-caffeine (FIORICET, ESGIC) -40 MG per tablet Take 1 tablet by mouth Every 6 (Six) Hours As Needed for Migraine. 30 tablet 0    Calcium Citrate-Vitamin D (CALCIUM CITRATE CHEWY BITE PO) Take 1 dose by mouth 3 (Three) Times a Day.      clotrimazole-betamethasone (LOTRISONE) 1-0.05 % cream Apply 1 application  topically to the appropriate area as directed 2 (Two) Times a Day. 90 g 2    cyanocobalamin 1000 MCG/ML injection Inject 1 mL into the appropriate muscle as directed by prescriber Every 30 (Thirty) Days. VITAMIN B12 3 mL 2    Estrogens Conjugated (Premarin) 0.625 MG/GM vaginal cream 0.5 g inserted vaginally, twice weekly. 30 g 0    losartan (COZAAR) 50 MG tablet Take 1 tablet by mouth Daily. 90 tablet 3    Multiple Vitamins-Minerals (BARIATRIC MULTIVITAMINS/IRON PO) Take 1 tablet by mouth Daily.      Probiotic Product (FloraVantage) capsule Take 1 each by mouth Daily. CHEWABLE      sertraline (ZOLOFT) 100 MG tablet Take 1 tablet by mouth Daily. 90 tablet 3    vitamin D (ERGOCALCIFEROL) 1.25 MG (13857 UT) capsule capsule Take 1 capsule by mouth 1 (One) Time Per Week. 12 capsule 3    montelukast (SINGULAIR) 10 MG tablet Take 1 tablet by mouth Every Night.       No current facility-administered medications for this visit.       Assessment & Plan    POD  6 months  from Sleeve Gastrectomy.  Diagnoses and all orders for this visit:    1. Status post laparoscopic sleeve gastrectomy (Primary)  Assessment & Plan:  Continue with current regimen     Orders:  -     CBC & Differential; Future  -     Comprehensive Metabolic Panel; Future  -     Zinc; Future  -     Folate; Future  -     Vitamin A & E; Future  -     Vitamin B12; Future  -     Vitamin D,25-Hydroxy; Future  -     Vitamin B6; Future  -     Copper, Serum; Future  -     Iron; Future  -     Vitamin B1, Whole Blood; Future    2. Class 3 severe obesity due to excess calories with serious comorbidity and body mass index (BMI) of 45.0 to  49.9 in adult  Assessment & Plan:  Patient's (Body mass index is 41.8 kg/m².) indicates that they are morbidly/severely obese (BMI > 40 or > 35 with obesity - related health condition) with health conditions that include hypertension and GERD . Weight is unchanged. BMI  is above average; BMI management plan is completed. We discussed portion control and increasing exercise.     Orders:  -     CBC & Differential; Future  -     Comprehensive Metabolic Panel; Future  -     Zinc; Future  -     Folate; Future  -     Vitamin A & E; Future  -     Vitamin B12; Future  -     Vitamin D,25-Hydroxy; Future  -     Vitamin B6; Future  -     Copper, Serum; Future  -     Iron; Future  -     Vitamin B1, Whole Blood; Future    3. Screening for malnutrition  -     CBC & Differential; Future  -     Comprehensive Metabolic Panel; Future  -     Zinc; Future  -     Folate; Future  -     Vitamin A & E; Future  -     Vitamin B12; Future  -     Vitamin D,25-Hydroxy; Future  -     Vitamin B6; Future  -     Copper, Serum; Future  -     Iron; Future  -     Vitamin B1, Whole Blood; Future    4. Vitamin D deficiency  -     CBC & Differential; Future  -     Comprehensive Metabolic Panel; Future  -     Zinc; Future  -     Folate; Future  -     Vitamin A & E; Future  -     Vitamin B12; Future  -     Vitamin D,25-Hydroxy; Future  -     Vitamin B6; Future  -     Copper, Serum; Future  -     Iron; Future  -     Vitamin B1, Whole Blood; Future           Allison Mckeon and I discussed the importance of changing behavior for consistent and successful weight loss. We first reviewed again the definition of a meal which is defined as portion sizes less than a half a cup and those portions incorporating a protein. Specifically the protein should fill half of that volume. I also explained that she should attempt to consume 4 meals as defined above daily and to avoid snacking or grazing. She should also be mindful of adequate hydration by consuming at least 64  oz of water daily and incorporation of a regular exercise regimen.     I discussed the importance of taking her multivitamins as directed.  She is to return to our office 6 months or as needed.    Goals for this month are:   Labwork ordered, continue multivitamin   Overall patient has done very well  and states she is measuring all meals and states she is using a protein supplement daily        Manda Benton, APRN  11/06/23  10:40 CST

## 2023-11-13 LAB
A-TOCOPHEROL VIT E SERPL-MCNC: 11.4 MG/L (ref 7–25.1)
GAMMA TOCOPHEROL SERPL-MCNC: 1.6 MG/L (ref 0.5–5.5)
VIT A SERPL-MCNC: 50.2 UG/DL (ref 20.1–62)
VIT B1 BLD-SCNC: 124.8 NMOL/L (ref 66.5–200)

## 2023-11-14 LAB — PYRIDOXAL PHOS SERPL-MCNC: 17.9 UG/L (ref 3.4–65.2)

## 2023-11-15 LAB
COPPER SERPL-MCNC: 142 UG/DL (ref 80–158)
ZINC SERPL-MCNC: 90 UG/DL (ref 44–115)

## 2023-11-16 ENCOUNTER — TELEPHONE (OUTPATIENT)
Dept: BARIATRICS/WEIGHT MGMT | Facility: CLINIC | Age: 43
End: 2023-11-16
Payer: COMMERCIAL

## 2023-11-16 NOTE — TELEPHONE ENCOUNTER
----- Message from EZIO Montanez sent at 11/16/2023 12:05 PM CST -----  Please advise labs look great         LVM for her to call me back.

## 2023-12-18 ENCOUNTER — TELEPHONE (OUTPATIENT)
Dept: OBSTETRICS AND GYNECOLOGY | Facility: CLINIC | Age: 43
End: 2023-12-18
Payer: COMMERCIAL

## 2023-12-18 NOTE — TELEPHONE ENCOUNTER
[employer] message sent to pt informing of changing time on patients video visit appointment this Thursday.

## 2023-12-21 RX ORDER — CONJUGATED ESTROGENS 0.62 MG/G
CREAM VAGINAL
Qty: 30 G | Refills: 0 | Status: SHIPPED | OUTPATIENT
Start: 2023-12-21 | End: 2023-12-22 | Stop reason: SDUPTHER

## 2023-12-21 NOTE — TELEPHONE ENCOUNTER
This patient is needing a 90 day refill supply of premarin 06.25 MG/G sent to [facility]'s pharmacy. Lucina ask me to send this to you to pend.And she will sign it.

## 2023-12-22 RX ORDER — CONJUGATED ESTROGENS 0.62 MG/G
CREAM VAGINAL
Qty: 90 G | Refills: 0 | OUTPATIENT
Start: 2023-12-22

## 2023-12-22 RX ORDER — CONJUGATED ESTROGENS 0.62 MG/G
CREAM VAGINAL
Qty: 90 G | Refills: 0 | Status: SHIPPED | OUTPATIENT
Start: 2023-12-22

## 2023-12-22 NOTE — TELEPHONE ENCOUNTER
This patient is needing a 90 day refill supply of premarin 06.25 MG/G sent to [facility]'s pharmacy. Lucina ask me to send this to you to pend.And she will sign it.

## 2024-02-23 ENCOUNTER — TELEMEDICINE (OUTPATIENT)
Dept: BARIATRICS/WEIGHT MGMT | Facility: CLINIC | Age: 44
End: 2024-02-23
Payer: COMMERCIAL

## 2024-02-23 DIAGNOSIS — E66.01 CLASS 3 SEVERE OBESITY WITH SERIOUS COMORBIDITY AND BODY MASS INDEX (BMI) OF 40.0 TO 44.9 IN ADULT, UNSPECIFIED OBESITY TYPE: Primary | ICD-10-CM

## 2024-02-23 DIAGNOSIS — Z98.84 STATUS POST LAPAROSCOPIC SLEEVE GASTRECTOMY: ICD-10-CM

## 2024-02-23 NOTE — PROGRESS NOTES
"Metabolic and Bariatric Surgery Adult Nutrition Assessment    Patient Name: Allison Mckeon   YOB: 1980   MRN: [id number]     Assessment Date:  2024     Reason for Visit: Follow-up Nutrition Assessment     Treatment Pathway: Postoperative Gastric Sleeve Gastrectomy 9 mo    Assessment    Anthropometrics   Wt Readings from Last 1 Encounters:   23 117 kg (259 lb)     Ht Readings from Last 1 Encounters:   23 167.6 cm (66\")     BMI Readings from Last 1 Encounters:   23 41.80 kg/m²      *is weighing weekly on home scale on Sundays. 238 lbs on home scale on (24)  Initial Weight/Date: 355.6 lbs (2022)  Presurgical Weight: 342.2lbs (2023)  Surgery Date: May 11, 2023  Weight Changes since last visit: -21 lbs (based on current home scale weight)  Net Weight Change: -117.6 lbs (based on current home scale weight)    Past Medical History:   Diagnosis Date    ADHD (attention deficit hyperactivity disorder)     Allergic     Avocado; Anaphylaxis    Allergic rhinitis     Anxiety     Asthma 2010    Cholelithiasis     Colon polyp 2010    Depression 1996    Disease of thyroid gland 2012    Hashimotos    Fibromyalgia, primary     GERD (gastroesophageal reflux disease)     Glaucoma 2010    Elevated Pressure    Headache 1996    Hernia     Hypertension     Obesity     Personal history of COVID-19 2023    PONV (postoperative nausea and vomiting)     Sleep apnea     Does Not use CPAP      Past Surgical History:   Procedure Laterality Date    ADENOIDECTOMY  1983     SECTION      CHOLECYSTECTOMY  2004    COLONOSCOPY      D & C WITH SUCTION      x2    ENDOMETRIAL ABLATION  2015    ENDOSCOPY N/A 2023    Procedure: ESOPHAGOGASTRODUODENOSCOPY WITH ANESTHESIA;  Surgeon: Binh Michele MD;  Location: [facility] ENDOSCOPY;  Service: General;  Laterality: N/A;  pre screen  post esophagitis       GASTRIC SLEEVE LAPAROSCOPIC N/A 2023    Procedure: " GASTRIC SLEEVE LAPAROSCOPIC AND HIATAL HERNIA REPAIR WITH DAVINCI ROBOT;  Surgeon: Binh Michele MD;  Location: [facility];  Service: Robotics - DaVinci;  Laterality: N/A;    SINUS SURGERY  2018    septoplasty with turbinates    TONSILLECTOMY      TUBAL ABDOMINAL LIGATION  1999    TUBAL ABDOMINAL LIGATION      tubal reversal 2010    VAGINAL HYSTERECTOMY      still has tubes and ovaries      Current Outpatient Medications   Medication Sig Dispense Refill    busPIRone (BUSPAR) 7.5 MG tablet Take 1 tablet by mouth 3 (Three) Times a Day As Needed (anxiety). 90 tablet 2    butalbital-acetaminophen-caffeine (FIORICET, ESGIC) -40 MG per tablet Take 1 tablet by mouth Every 6 (Six) Hours As Needed for Migraine. 30 tablet 0    Calcium Citrate-Vitamin D (CALCIUM CITRATE CHEWY BITE PO) Take 1 dose by mouth 3 (Three) Times a Day.      clotrimazole-betamethasone (LOTRISONE) 1-0.05 % cream Apply 1 application  topically to the appropriate area as directed 2 (Two) Times a Day. 90 g 2    cyanocobalamin 1000 MCG/ML injection Inject 1 mL into the appropriate muscle as directed by prescriber Every 30 (Thirty) Days. VITAMIN B12 3 mL 2    Estrogens Conjugated (Premarin) 0.625 MG/GM vaginal cream 0.5 g inserted vaginally, twice weekly. 90 g 0    losartan (COZAAR) 50 MG tablet Take 1 tablet by mouth Daily. 90 tablet 3    montelukast (SINGULAIR) 10 MG tablet Take 1 tablet by mouth Every Night.      Multiple Vitamins-Minerals (BARIATRIC MULTIVITAMINS/IRON PO) Take 1 tablet by mouth Daily.      Probiotic Product (FloraVantage) capsule Take 1 each by mouth Daily. CHEWABLE      sertraline (ZOLOFT) 100 MG tablet Take 1 tablet by mouth Daily. 90 tablet 3    vitamin D (ERGOCALCIFEROL) 1.25 MG (16071 UT) capsule capsule Take 1 capsule by mouth 1 (One) Time Per Week. 12 capsule 3     No current facility-administered medications for this visit.      Allergies   Allergen Reactions    Avocado Anaphylaxis    Prilosec [Omeprazole] Rash, Other  (See Comments) and GI Intolerance     Achy joints    Promethazine Itching      - PHENERGAN -         Pertinent Social/Behavior/Environmental History: Traveling more frequently with current job.     Nutrition Recall  Eating 3 meals daily + protein shake daily  (M1) when traveling will have HB egg with 1/2 banana/apple from hotel breakfast bar. WHEN at HOME - veggie omelet. Sometimes brings leftovers from dinner back to hotel to have veg in the morning.   (M2) leftovers from the night before  (M3) when eating out choosing restaurants that have meat and vegetables. Requesting no additional butter added.   (M4) n/a   Snacking - x2-3 days/wk gets intense hunger in the evenings before bed. Has been trying to use 100 calorie pack of nuts or apple.   Monitoring portions- collapsible measuring cups.   Calculating Protein- close to 65 grams.   Drinking sugary/carbonated beverages- none  Fluid Intake- 80+ ounces water daily. Crystal light packets as needed.   Supplement Intake- is drinking a protein shake daily.   MVI/B12/Calcium Citrate/Vitamin D- using patches.     Barriers: frustrated due to slowed weight loss rate.       Nutrition Intervention  Nutrition education for morbid obesity s/p sleeve gastrectomy and nutrition coaching for behavior change provided.  Strategies used included Comprehensive education, Motivational Interviewing , Problem Solving, Skill Development for meal planning and foods to travel with, and Ongoing reinforcement  Reviewed nutritional needs for Postoperative Gastric Sleeve Gastrectomy 9 mo . Including but not limited to daily bariatric or multi-vitamin daily, B12, Calcium Citrate with Vitamin D, Eat 4 meals per day at no more than 1/2 c portions, , Half of all meals should be servings of vegetables., Protein goal: 65 gms., 1 protein shake daily (discussed guidelines of compliant shakes), Eliminate snacks., Choose more nutrient dense foods., and Choose foods with increased fiber.   Self-monitoring  strategies such as keeping a food journal (on paper or electronically) and calculating fluid/protein intake were discussed.     Goals  1. Utilize freeze dried, crunchy vegetables when traveling for vegetables servings at meals.   2. Have 4 solid meals daily.     Monitoring/Evaluation Plan  Anticipate follow up in 3 months. Continue collaboration of care with physician and treatment team.     Time Spent 25 minutes    Electronically signed by  Yasmeen Veloz RDN, LD  02/23/2024 09:09 CST.

## 2024-03-08 ENCOUNTER — OFFICE VISIT (OUTPATIENT)
Dept: INTERNAL MEDICINE | Facility: CLINIC | Age: 44
End: 2024-03-08
Payer: COMMERCIAL

## 2024-03-08 VITALS
SYSTOLIC BLOOD PRESSURE: 122 MMHG | DIASTOLIC BLOOD PRESSURE: 80 MMHG | BODY MASS INDEX: 39.63 KG/M2 | HEART RATE: 78 BPM | WEIGHT: 246.6 LBS | OXYGEN SATURATION: 97 % | HEIGHT: 66 IN | TEMPERATURE: 97.9 F

## 2024-03-08 DIAGNOSIS — F98.8 ATTENTION DEFICIT DISORDER, UNSPECIFIED HYPERACTIVITY PRESENCE: Primary | ICD-10-CM

## 2024-03-08 DIAGNOSIS — Z86.69 HISTORY OF MIGRAINE: ICD-10-CM

## 2024-03-08 DIAGNOSIS — Z12.31 BREAST CANCER SCREENING BY MAMMOGRAM: ICD-10-CM

## 2024-03-08 DIAGNOSIS — Z00.00 WELLNESS EXAMINATION: Primary | ICD-10-CM

## 2024-03-08 DIAGNOSIS — F41.9 ANXIETY: ICD-10-CM

## 2024-03-08 DIAGNOSIS — Z79.899 CONTROLLED SUBSTANCE AGREEMENT SIGNED: ICD-10-CM

## 2024-03-08 LAB
AMPHET+METHAMPHET UR QL: NEGATIVE
AMPHETAMINE INTERNAL CONTROL: NORMAL
AMPHETAMINES UR QL: NEGATIVE
BARBITURATE INTERNAL CONTROL: NORMAL
BARBITURATES UR QL SCN: NEGATIVE
BENZODIAZ UR QL SCN: NEGATIVE
BENZODIAZEPINE INTERNAL CONTROL: NORMAL
BUPRENORPHINE INTERNAL CONTROL: NORMAL
BUPRENORPHINE SERPL-MCNC: NEGATIVE NG/ML
CANNABINOIDS SERPL QL: NEGATIVE
COCAINE INTERNAL CONTROL: NORMAL
COCAINE UR QL: NEGATIVE
EXPIRATION DATE: NORMAL
Lab: NORMAL
MDMA (ECSTASY) INTERNAL CONTROL: NORMAL
MDMA UR QL SCN: NEGATIVE
METHADONE INTERNAL CONTROL: NORMAL
METHADONE UR QL SCN: NEGATIVE
METHAMPHETAMINE INTERNAL CONTROL: NORMAL
MORPHINE INTERNAL CONTROL: NORMAL
MORPHINE/OPIATES SCREEN, URINE: NEGATIVE
OXYCODONE INTERNAL CONTROL: NORMAL
OXYCODONE UR QL SCN: NEGATIVE
PCP UR QL SCN: NEGATIVE
PHENCYCLIDINE INTERNAL CONTROL: NORMAL
PROPOXYPH UR QL SCN: NEGATIVE
PROPOXYPHENE INTERNAL CONTROL: NORMAL
THC INTERNAL CONTROL: NORMAL
TRICYCLIC ANTIDEPRESSANTS INTERNAL CONTROL: NORMAL
TRICYCLICS UR QL SCN: NEGATIVE

## 2024-03-08 RX ORDER — LISDEXAMFETAMINE DIMESYLATE CAPSULES 30 MG/1
30 CAPSULE ORAL EVERY MORNING
Qty: 30 CAPSULE | Refills: 0 | Status: SHIPPED | OUTPATIENT
Start: 2024-03-08

## 2024-03-08 RX ORDER — ERGOCALCIFEROL 1.25 MG/1
50000 CAPSULE ORAL WEEKLY
Qty: 12 CAPSULE | Refills: 3 | Status: SHIPPED | OUTPATIENT
Start: 2024-03-08

## 2024-03-08 RX ORDER — BUTALBITAL, ACETAMINOPHEN AND CAFFEINE 50; 325; 40 MG/1; MG/1; MG/1
1 TABLET ORAL EVERY 6 HOURS PRN
Qty: 30 TABLET | Refills: 0 | Status: SHIPPED | OUTPATIENT
Start: 2024-03-08

## 2024-03-08 RX ORDER — BUTALBITAL, ACETAMINOPHEN AND CAFFEINE 50; 325; 40 MG/1; MG/1; MG/1
1 TABLET ORAL EVERY 6 HOURS PRN
Qty: 30 TABLET | Refills: 0 | Status: CANCELLED | OUTPATIENT
Start: 2024-03-08

## 2024-03-08 RX ORDER — SERTRALINE HYDROCHLORIDE 100 MG/1
150 TABLET, FILM COATED ORAL DAILY
Qty: 135 TABLET | Refills: 3 | Status: SHIPPED | OUTPATIENT
Start: 2024-03-08

## 2024-03-08 NOTE — PROGRESS NOTES
Subjective     Chief Complaint:  Anxiety.  ADHD.    HPI:  Patient presents to the office today for her annual physical exam.  She does complain of worsening anxiety and no longer feels that the BuSpar has been effective for her.  She also complains of worsening ADHD symptoms and would like to try to go back on medication for this.  She stopped taking methylphenidate over a year ago due to supply issues.  Please see assessment and plan below.    Patient's PMR from outside medical facility reviewed and noted.    Past Medical History:   Past Medical History:   Diagnosis Date    ADHD (attention deficit hyperactivity disorder)     Allergic     Avocado; Anaphylaxis    Allergic rhinitis     Anxiety     Asthma     Cholelithiasis     Colon polyp     Depression     Disease of thyroid gland 2012    Hashimotos    Fibromyalgia, primary 2012    GERD (gastroesophageal reflux disease)     Glaucoma     Elevated Pressure    Headache     Hernia     Hypertension     Obesity     Personal history of COVID-19 2023    PONV (postoperative nausea and vomiting)     Sleep apnea     Does Not use CPAP     Past Surgical History:  Past Surgical History:   Procedure Laterality Date    ADENOIDECTOMY       SECTION      CHOLECYSTECTOMY      COLONOSCOPY      D & C WITH SUCTION      x2    ENDOMETRIAL ABLATION  2015    ENDOSCOPY N/A 2023    Procedure: ESOPHAGOGASTRODUODENOSCOPY WITH ANESTHESIA;  Surgeon: Binh Michele MD;  Location: [facility] ENDOSCOPY;  Service: General;  Laterality: N/A;  pre screen  post esophagitis       GASTRIC SLEEVE LAPAROSCOPIC N/A 2023    Procedure: GASTRIC SLEEVE LAPAROSCOPIC AND HIATAL HERNIA REPAIR WITH DAVINCI ROBOT;  Surgeon: Binh Michele MD;  Location: [facility] OR;  Service: Robotics - DaVinci;  Laterality: N/A;    SINUS SURGERY  2018    septoplasty with turbinates    TONSILLECTOMY      TUBAL ABDOMINAL LIGATION      TUBAL  ABDOMINAL LIGATION      tubal reversal 2010    VAGINAL HYSTERECTOMY      still has tubes and ovaries     Social History:  reports that she has never smoked. She has never used smokeless tobacco. She reports current alcohol use of about 2.0 standard drinks of alcohol per week. She reports that she does not use drugs.    Family History: family history includes Asthma in her mother; Breast cancer (age of onset: 37) in her maternal aunt; Cancer in her maternal grandfather, maternal grandmother, and paternal grandmother; Colon cancer in her maternal grandmother; Depression in her father; Diabetes in her paternal grandmother; Early death in her paternal aunt and paternal grandfather; Heart disease in her maternal grandmother; Learning disabilities in her father and son; Lung cancer in her maternal grandmother; Mental illness in her father; Pancreatic cancer in her maternal grandfather; Thyroid disease in her maternal grandfather and mother; Vision loss in her paternal grandmother. She was adopted.      Allergies:  Allergies   Allergen Reactions    Avocado Anaphylaxis    Prilosec [Omeprazole] Rash, Other (See Comments) and GI Intolerance     Achy joints    Promethazine Itching      - PHENERGAN -      Medications:  Prior to Admission medications    Medication Sig Start Date End Date Taking? Authorizing Provider   busPIRone (BUSPAR) 7.5 MG tablet Take 1 tablet by mouth 3 (Three) Times a Day As Needed (anxiety). 11/1/23  Yes Wendy Andrade APRN   butalbital-acetaminophen-caffeine (FIORICET, ESGIC) -40 MG per tablet Take 1 tablet by mouth Every 6 (Six) Hours As Needed for Migraine. 3/8/24  Yes Wendy Andrade APRN   Calcium Citrate-Vitamin D (CALCIUM CITRATE CHEWY BITE PO) Take 1 dose by mouth 3 (Three) Times a Day.   Yes Provider, MD Aníbal   clotrimazole-betamethasone (LOTRISONE) 1-0.05 % cream Apply 1 application  topically to the appropriate area as directed 2 (Two) Times a Day. 11/1/23  Yes Raymond  "EZIO Arriaga   cyanocobalamin 1000 MCG/ML injection Inject 1 mL into the appropriate muscle as directed by prescriber Every 30 (Thirty) Days. VITAMIN B12 11/1/23  Yes Wendy Andrade APRN   Estrogens Conjugated (Premarin) 0.625 MG/GM vaginal cream 0.5 g inserted vaginally, twice weekly. 12/22/23  Yes Lucina Worrell APRN   montelukast (SINGULAIR) 10 MG tablet Take 1 tablet by mouth Every Night.   Yes ProviderAníbal MD   Multiple Vitamins-Minerals (BARIATRIC MULTIVITAMINS/IRON PO) Take 1 tablet by mouth Daily.   Yes Aníbal Beltran MD   Probiotic Product (FloraVantage) capsule Take 1 each by mouth Daily. CHEWABLE   Yes Aníbal Beltran MD   sertraline (ZOLOFT) 100 MG tablet Take 1.5 tablets by mouth Daily. 3/8/24  Yes Wendy Andrade APRN   vitamin D (ERGOCALCIFEROL) 1.25 MG (03701 UT) capsule capsule Take 1 capsule by mouth 1 (One) Time Per Week. 3/8/24  Yes Wendy Andrade APRN   butalbital-acetaminophen-caffeine (FIORICET, ESGIC) -40 MG per tablet Take 1 tablet by mouth Every 6 (Six) Hours As Needed for Migraine. 11/1/23 3/8/24 Yes Wendy Andrade APRN   sertraline (ZOLOFT) 100 MG tablet Take 1 tablet by mouth Daily. 11/1/23 3/8/24 Yes Wendy Andrade APRN   vitamin D (ERGOCALCIFEROL) 1.25 MG (80388 UT) capsule capsule Take 1 capsule by mouth 1 (One) Time Per Week. 11/1/23 3/8/24 Yes Wendy Andrade APRN   losartan (COZAAR) 50 MG tablet Take 1 tablet by mouth Daily.  Patient not taking: Reported on 3/8/2024 11/1/23 3/8/24  Wendy Andrade APRN       Objective     Vital Signs: /80 (BP Location: Left arm, Patient Position: Sitting, Cuff Size: Large Adult)   Pulse 78   Temp 97.9 °F (36.6 °C) (Temporal)   Ht 167.4 cm (65.89\")   Wt 112 kg (246 lb 9.6 oz)   SpO2 97%   BMI 39.94 kg/m²   Physical Exam  Vitals and nursing note reviewed.   Constitutional:       General: She is not in acute distress.     Appearance: She is obese. She is not " ill-appearing or toxic-appearing.   HENT:      Head: Normocephalic and atraumatic.      Mouth/Throat:      Mouth: Mucous membranes are moist.      Pharynx: Oropharynx is clear.   Cardiovascular:      Rate and Rhythm: Normal rate and regular rhythm.      Pulses: Normal pulses.      Heart sounds: Normal heart sounds.   Pulmonary:      Effort: Pulmonary effort is normal.      Breath sounds: No wheezing, rhonchi or rales.   Abdominal:      General: Bowel sounds are normal. There is no distension.      Palpations: Abdomen is soft.      Tenderness: There is no abdominal tenderness.   Musculoskeletal:         General: No swelling or tenderness. Normal range of motion.      Cervical back: Normal range of motion and neck supple. No tenderness.   Skin:     General: Skin is warm and dry.      Findings: No erythema or rash.   Neurological:      General: No focal deficit present.      Mental Status: She is alert and oriented to person, place, and time.   Psychiatric:         Mood and Affect: Mood normal.         Behavior: Behavior normal.         Thought Content: Thought content normal.         Judgment: Judgment normal.     Results Reviewed:  POC Medline 14 Panel Urine Drug Screen (03/08/2024 09:50)   Vitamin B1, Whole Blood (11/06/2023 11:10)  Iron (11/06/2023 11:10)  Copper, Serum (11/06/2023 11:10)  Vitamin B6 (11/06/2023 11:10)  Vitamin D,25-Hydroxy (11/06/2023 11:10)  Vitamin B12 (11/06/2023 11:10)  Vitamin A & E (11/06/2023 11:10)  Folate (11/06/2023 11:10)  Zinc (11/06/2023 11:10)  Comprehensive Metabolic Panel (11/06/2023 11:10)  CBC & Differential (11/06/2023 11:10)  DHEA-Sulfate (11/01/2023 13:59)  Testosterone (11/01/2023 13:59)  Progesterone (11/01/2023 13:59)  Estrogens, Total (11/01/2023 13:59)  TSH Rfx On Abnormal To Free T4 (11/01/2023 13:59)  Office Visit with Lucina Worrell APRN (09/18/2023)   Assessment / Plan     Assessment/Plan:  Diagnoses and all orders for this visit:    1. Wellness examination  (Primary)  -     CBC & Differential  -     Comprehensive Metabolic Panel  -     Urinalysis With Microscopic - Urine, Clean Catch  -     Lipid Panel  -     TSH Rfx On Abnormal To Free T4    2. History of migraine  -     butalbital-acetaminophen-caffeine (FIORICET, ESGIC) -40 MG per tablet; Take 1 tablet by mouth Every 6 (Six) Hours As Needed for Migraine.  Dispense: 30 tablet; Refill: 0    3. Anxiety  -     sertraline (ZOLOFT) 100 MG tablet; Take 1.5 tablets by mouth Daily.  Dispense: 135 tablet; Refill: 3    4. Controlled substance agreement signed  -     POC Medline 14 Panel Urine Drug Screen    5. Breast cancer screening by mammogram  -     Mammo Screening Digital Tomosynthesis Bilateral With CAD; Future    Other orders  -     vitamin D (ERGOCALCIFEROL) 1.25 MG (00771 UT) capsule capsule; Take 1 capsule by mouth 1 (One) Time Per Week.  Dispense: 12 capsule; Refill: 3       Patient presents to the office today for her annual physical exam.  She does complain of worsening anxiety and no longer feels that the BuSpar has been as effective for her as it once was.  She states this does make her more relaxed physically but not mentally.  She indicates taking this more often than she was, approximately 3 to 4 days/week.  She does continue to go to therapy and changed therapist in February which she feels was a positive change.  We discussed different options for medication changes, and at this time I would like to increase her Zoloft from 100 mg to 150 mg and continue her BuSpar at current dosage.  Short-term follow-up in 4 weeks to reassess symptoms.    The patient also complains of worsening ADHD symptoms and would like to try to go back on medication for this.  She stopped taking methylphenidate over a year ago due to supply issues.  She does report taking Vyvanse in the past and had success with this as well.  Urine drug screen performed today which was appropriate. CSA completed with patient as well.   Prescription for Vyvanse was pended to the appropriate provider.    Patient will have fasting labs drawn today as above.  Will follow-up with these results and make changes to plan of care as necessary.  She did have vitamins and minerals checked per bariatric surgeon in November, which were normal.  She continues to do fairly well with weight loss following gastric sleeve in May 2023.  She does tell me that she has not been able to lose any weight since the beginning of the year, but has also not gained any weight.  She has met with the dietitian and feels that her meal plans are appropriate at this time.  We did discuss incorporating more exercise to her regimen.    Patient tells me since her last office visit she has stopped taking her losartan as she was feeling lethargic and tired and felt that her blood pressure was running too low.  Her blood pressure is well-controlled without medication at this time at 122/80.  We did discuss monitoring this closely after starting Vyvanse as this can cause hypertension and tachycardia.    Patient's last mammogram was greater than 1 year ago, repeat mammogram has been ordered.  She is performing self breast exams and has noted no problems.  She has had a partial hysterectomy in the past, does have ovaries and fallopian tubes remaining.  She did see gynecology for yearly exam in September.  She is also now on hormone replacement therapy with estrogen cream.  She feels this is working well to help with her symptoms.    Patient indicates her last colonoscopy was 2 years ago, and she was told she would not need another colonoscopy for 5 years, would plan to repeat in 2027 unless issues arise prior to that time.    Patient is up-to-date on dental exam.  She states she needs to go to the eye doctor.  She does wear glasses as needed.  She denies any concerns with her vision at this time.  We discussed the importance of wearing sunscreen when outdoors and wearing a seatbelt when  riding in or driving a motor vehicle.    Return in about 4 weeks (around 4/5/2024) for Recheck- anxiety. unless patient needs to be seen sooner or acute issues arise.    I have discussed the patient results/orders and and plan/recommendation with them at today's visit.      Wendy Andrade, EZIO   03/08/2024

## 2024-03-09 LAB
ALBUMIN SERPL-MCNC: 4.6 G/DL (ref 3.5–5.2)
ALBUMIN/GLOB SERPL: 2.2 G/DL
ALP SERPL-CCNC: 74 U/L (ref 39–117)
ALT SERPL-CCNC: 15 U/L (ref 1–33)
APPEARANCE UR: CLEAR
AST SERPL-CCNC: 16 U/L (ref 1–32)
BACTERIA #/AREA URNS HPF: NORMAL /HPF
BASOPHILS # BLD AUTO: 0.04 10*3/MM3 (ref 0–0.2)
BASOPHILS NFR BLD AUTO: 0.4 % (ref 0–1.5)
BILIRUB SERPL-MCNC: 0.5 MG/DL (ref 0–1.2)
BILIRUB UR QL STRIP: NEGATIVE
BUN SERPL-MCNC: 17 MG/DL (ref 6–20)
BUN/CREAT SERPL: 26.2 (ref 7–25)
CALCIUM SERPL-MCNC: 9 MG/DL (ref 8.6–10.5)
CASTS URNS MICRO: NORMAL
CHLORIDE SERPL-SCNC: 103 MMOL/L (ref 98–107)
CHOLEST SERPL-MCNC: 204 MG/DL (ref 0–200)
CO2 SERPL-SCNC: 25.5 MMOL/L (ref 22–29)
COLOR UR: YELLOW
CREAT SERPL-MCNC: 0.65 MG/DL (ref 0.57–1)
EGFRCR SERPLBLD CKD-EPI 2021: 110.8 ML/MIN/1.73
EOSINOPHIL # BLD AUTO: 0.13 10*3/MM3 (ref 0–0.4)
EOSINOPHIL NFR BLD AUTO: 1.4 % (ref 0.3–6.2)
EPI CELLS #/AREA URNS HPF: NORMAL /HPF
ERYTHROCYTE [DISTWIDTH] IN BLOOD BY AUTOMATED COUNT: 13.9 % (ref 12.3–15.4)
GLOBULIN SER CALC-MCNC: 2.1 GM/DL
GLUCOSE SERPL-MCNC: 85 MG/DL (ref 65–99)
GLUCOSE UR QL STRIP: NEGATIVE
HCT VFR BLD AUTO: 45.3 % (ref 34–46.6)
HDLC SERPL-MCNC: 67 MG/DL (ref 40–60)
HGB BLD-MCNC: 14.5 G/DL (ref 12–15.9)
HGB UR QL STRIP: NEGATIVE
IMM GRANULOCYTES # BLD AUTO: 0.01 10*3/MM3 (ref 0–0.05)
IMM GRANULOCYTES NFR BLD AUTO: 0.1 % (ref 0–0.5)
KETONES UR QL STRIP: NEGATIVE
LDLC SERPL CALC-MCNC: 118 MG/DL (ref 0–100)
LEUKOCYTE ESTERASE UR QL STRIP: NEGATIVE
LYMPHOCYTES # BLD AUTO: 2.98 10*3/MM3 (ref 0.7–3.1)
LYMPHOCYTES NFR BLD AUTO: 33.1 % (ref 19.6–45.3)
MCH RBC QN AUTO: 28.2 PG (ref 26.6–33)
MCHC RBC AUTO-ENTMCNC: 32 G/DL (ref 31.5–35.7)
MCV RBC AUTO: 88 FL (ref 79–97)
MONOCYTES # BLD AUTO: 0.53 10*3/MM3 (ref 0.1–0.9)
MONOCYTES NFR BLD AUTO: 5.9 % (ref 5–12)
NEUTROPHILS # BLD AUTO: 5.3 10*3/MM3 (ref 1.7–7)
NEUTROPHILS NFR BLD AUTO: 59.1 % (ref 42.7–76)
NITRITE UR QL STRIP: NEGATIVE
NRBC BLD AUTO-RTO: 0 /100 WBC (ref 0–0.2)
PH UR STRIP: 7.5 [PH] (ref 5–8)
PLATELET # BLD AUTO: 299 10*3/MM3 (ref 140–450)
POTASSIUM SERPL-SCNC: 4.5 MMOL/L (ref 3.5–5.2)
PROT SERPL-MCNC: 6.7 G/DL (ref 6–8.5)
PROT UR QL STRIP: NEGATIVE
RBC # BLD AUTO: 5.15 10*6/MM3 (ref 3.77–5.28)
RBC #/AREA URNS HPF: NORMAL /HPF
SODIUM SERPL-SCNC: 140 MMOL/L (ref 136–145)
SP GR UR STRIP: 1.01 (ref 1–1.03)
TRIGL SERPL-MCNC: 107 MG/DL (ref 0–150)
TSH SERPL DL<=0.005 MIU/L-ACNC: 1.9 UIU/ML (ref 0.27–4.2)
UROBILINOGEN UR STRIP-MCNC: NORMAL MG/DL
VLDLC SERPL CALC-MCNC: 19 MG/DL (ref 5–40)
WBC # BLD AUTO: 8.99 10*3/MM3 (ref 3.4–10.8)
WBC #/AREA URNS HPF: NORMAL /HPF

## 2024-03-11 NOTE — PROGRESS NOTES
Lipid panel shows elevation of cholesterol. Would recommend dietary changes to include decreasing intake of red meat and increasing lean meat intake such as chicken, fish, turkey.  Would also recommend to increase healthy fats such as avocado, olive/olive oil, nuts/seeds.  Recommend decreasing processed/packaged foods. Also recommend adding regular physical activity. She does not need medication specifically for her cholesterol at this time. Otherwise, labs overall look great!

## 2024-03-21 ENCOUNTER — TELEMEDICINE (OUTPATIENT)
Dept: OBSTETRICS AND GYNECOLOGY | Age: 44
End: 2024-03-21
Payer: COMMERCIAL

## 2024-03-21 VITALS — BODY MASS INDEX: 39.53 KG/M2 | HEIGHT: 66 IN | WEIGHT: 246 LBS

## 2024-03-21 DIAGNOSIS — N95.2 ATROPHIC VAGINITIS: Primary | ICD-10-CM

## 2024-03-21 DIAGNOSIS — Z90.710 HISTORY OF HYSTERECTOMY: ICD-10-CM

## 2024-03-21 DIAGNOSIS — Z98.84 STATUS POST LAPAROSCOPIC SLEEVE GASTRECTOMY: ICD-10-CM

## 2024-03-21 RX ORDER — CONJUGATED ESTROGENS 0.62 MG/G
CREAM VAGINAL
Qty: 90 G | Refills: 2 | Status: SHIPPED | OUTPATIENT
Start: 2024-03-21

## 2024-03-21 NOTE — PATIENT INSTRUCTIONS

## 2024-03-21 NOTE — PROGRESS NOTES
"Mode of Visit: Video  Location of patient: home  You have chosen to receive care through a telehealth visit.  The patient has signed the video visit consent form.  The visit included audio and video interaction. No technical issues occurred during this visit.     Chief Complaint  Med Management (Follow up/Premarin vaginal cream/)    Subjective          Allison Mckeon presents to [facility]  History of Present Illness    Allison Mckeon 43-year-old female who presents via virtual visit for follow up on Premarin cream.    She uses the Premarin cream twice a week on separate days and does not use anything in between.   She is scheduled for a mammogram.   She has fibrocystic breast; she had a baseline mammogram 2 to 3 years ago in [place] followed by an ultrasound.  She does not have any new breast concerns.     Review of Systems   Genitourinary:         Improvement in vaginal dryness, not resolved      Objective   Vital Signs:   Ht 167.6 cm (66\")   Wt 112 kg (246 lb)   BMI 39.71 kg/m²     Physical Exam   Pulmonary/Chest: Effort normal.   Neurological: She is alert.     Result Review :              Current Outpatient Medications on File Prior to Visit   Medication Sig    busPIRone (BUSPAR) 7.5 MG tablet Take 1 tablet by mouth 3 (Three) Times a Day As Needed (anxiety).    butalbital-acetaminophen-caffeine (FIORICET, ESGIC) -40 MG per tablet Take 1 tablet by mouth Every 6 (Six) Hours As Needed for Migraine.    Calcium Citrate-Vitamin D (CALCIUM CITRATE CHEWY BITE PO) Take 1 dose by mouth 3 (Three) Times a Day.    clotrimazole-betamethasone (LOTRISONE) 1-0.05 % cream Apply 1 application  topically to the appropriate area as directed 2 (Two) Times a Day.    cyanocobalamin 1000 MCG/ML injection Inject 1 mL into the appropriate muscle as directed by prescriber Every 30 (Thirty) Days. VITAMIN B12    lisdexamfetamine (Vyvanse) 30 MG capsule Take 1 capsule by mouth Every Morning    montelukast " (SINGULAIR) 10 MG tablet Take 1 tablet by mouth Every Night.    Multiple Vitamins-Minerals (BARIATRIC MULTIVITAMINS/IRON PO) Take 1 tablet by mouth Daily.    Probiotic Product (FloraVantage) capsule Take 1 each by mouth Daily. CHEWABLE    sertraline (ZOLOFT) 100 MG tablet Take 1.5 tablets by mouth Daily.    vitamin D (ERGOCALCIFEROL) 1.25 MG (97602 UT) capsule capsule Take 1 capsule by mouth 1 (One) Time Per Week.    [DISCONTINUED] Estrogens Conjugated (Premarin) 0.625 MG/GM vaginal cream 0.5 g inserted vaginally, twice weekly.     No current facility-administered medications on file prior to visit.            Assessment and Plan      She was advised to use Premarin cream 2 nights a week on Sunday and Wednesday and then the other 5 days of the week use a pea-sized amount of coconut oil.   Premarin cream will be refilled with 3-month increments.  She was advised to keep her mammogram appointment and if normal then have yearly mammograms thereafter.  The patient will follow up in 09/2024 for her annual examination.    Diagnoses and all orders for this visit:    1. Atrophic vaginitis (Primary)    2. History of hysterectomy    3. Status post laparoscopic sleeve gastrectomy    Other orders  -     Estrogens Conjugated (Premarin) 0.625 MG/GM vaginal cream; 0.5 g inserted vaginally, twice weekly.  Dispense: 90 g; Refill: 2        Follow Up   Return for Annual physical, September.  Patient was given instructions and counseling regarding her condition or for health maintenance advice. Please see specific information pulled into the AVS if appropriate.     Transcribed from ambient dictation for EZIO Sequeira by Tamra Vieira.  03/21/24   09:30 CDT    Patient or patient representative verbalized consent to the visit recording.  I have personally performed the services described in this document as transcribed by the above individual, and it is both accurate and complete.  EZIO Sequeira  3/21/2024   09:46 CDT

## 2024-04-09 ENCOUNTER — OFFICE VISIT (OUTPATIENT)
Dept: INTERNAL MEDICINE | Facility: CLINIC | Age: 44
End: 2024-04-09
Payer: COMMERCIAL

## 2024-04-09 VITALS
WEIGHT: 248.8 LBS | OXYGEN SATURATION: 98 % | DIASTOLIC BLOOD PRESSURE: 74 MMHG | HEART RATE: 80 BPM | SYSTOLIC BLOOD PRESSURE: 116 MMHG | HEIGHT: 66 IN | BODY MASS INDEX: 39.98 KG/M2 | TEMPERATURE: 97.6 F

## 2024-04-09 DIAGNOSIS — F90.9 ADULT ADHD: ICD-10-CM

## 2024-04-09 DIAGNOSIS — F32.A ANXIETY AND DEPRESSION: Primary | ICD-10-CM

## 2024-04-09 DIAGNOSIS — H65.493 CHRONIC MEE (MIDDLE EAR EFFUSION), BILATERAL: ICD-10-CM

## 2024-04-09 DIAGNOSIS — F41.9 ANXIETY AND DEPRESSION: Primary | ICD-10-CM

## 2024-04-09 DIAGNOSIS — F98.8 ATTENTION DEFICIT DISORDER, UNSPECIFIED HYPERACTIVITY PRESENCE: ICD-10-CM

## 2024-04-09 DIAGNOSIS — J30.1 SEASONAL ALLERGIC RHINITIS DUE TO POLLEN: ICD-10-CM

## 2024-04-09 PROCEDURE — 99214 OFFICE O/P EST MOD 30 MIN: CPT | Performed by: NURSE PRACTITIONER

## 2024-04-09 RX ORDER — LISDEXAMFETAMINE DIMESYLATE CAPSULES 30 MG/1
30 CAPSULE ORAL EVERY MORNING
Qty: 30 CAPSULE | Refills: 0 | Status: SHIPPED | OUTPATIENT
Start: 2024-04-18

## 2024-04-09 RX ORDER — PERFLUOROHEXYLOCTANE 1 MG/MG
1 SOLUTION OPHTHALMIC 4 TIMES DAILY
COMMUNITY
Start: 2024-03-28

## 2024-04-09 RX ORDER — LISDEXAMFETAMINE DIMESYLATE CAPSULES 30 MG/1
30 CAPSULE ORAL EVERY MORNING
Qty: 30 CAPSULE | Refills: 0 | Status: SHIPPED | OUTPATIENT
Start: 2024-05-17

## 2024-04-09 NOTE — PROGRESS NOTES
Subjective     Chief Complaint:  Left ear fullness    HPI:  Patient presents to the office today for a 1 month follow-up after restarting Vyvanse for ADHD symptoms.  We had also increased her Zoloft secondary to increased anxiety.  She indicates medication changes are going well.  She does complain of some left ear fullness since coming back from vacation in [place] last week.  Please see assessment and plan below.    Patient's PMR from outside medical facility reviewed and noted.    Past Medical History:   Past Medical History:   Diagnosis Date    ADHD (attention deficit hyperactivity disorder)     Allergic     Avocado; Anaphylaxis    Allergic rhinitis     Anxiety     Asthma     Cholelithiasis     Colon polyp     Depression     Disease of thyroid gland     Hashimotos    Fibromyalgia, primary     GERD (gastroesophageal reflux disease)     Glaucoma     Elevated Pressure    Headache     Hernia     Hypertension     Obesity     Personal history of COVID-19 2023    PONV (postoperative nausea and vomiting)     Sleep apnea     Does Not use CPAP     Past Surgical History:  Past Surgical History:   Procedure Laterality Date    ADENOIDECTOMY       SECTION      CHOLECYSTECTOMY      COLONOSCOPY      D & C WITH SUCTION      x2    ENDOMETRIAL ABLATION      ENDOSCOPY N/A 2023    Procedure: ESOPHAGOGASTRODUODENOSCOPY WITH ANESTHESIA;  Surgeon: Binh Michele MD;  Location: [facility] ENDOSCOPY;  Service: General;  Laterality: N/A;  pre screen  post esophagitis       GASTRIC SLEEVE LAPAROSCOPIC N/A 2023    Procedure: GASTRIC SLEEVE LAPAROSCOPIC AND HIATAL HERNIA REPAIR WITH DAVINCI ROBOT;  Surgeon: Binh Michele MD;  Location: [facility] OR;  Service: Robotics - DaVinci;  Laterality: N/A;    SINUS SURGERY  2018    septoplasty with turbinates    TONSILLECTOMY      TUBAL ABDOMINAL LIGATION      TUBAL ABDOMINAL LIGATION      tubal  reversal 2010    VAGINAL HYSTERECTOMY      still has tubes and ovaries     Social History:  reports that she has never smoked. She has never used smokeless tobacco. She reports current alcohol use of about 2.0 standard drinks of alcohol per week. She reports that she does not use drugs.    Family History: family history includes Asthma in her mother; Breast cancer (age of onset: 37) in her maternal aunt; Cancer in her maternal grandfather, maternal grandmother, and paternal grandmother; Colon cancer in her maternal grandmother; Depression in her father; Diabetes in her paternal grandmother; Early death in her paternal aunt and paternal grandfather; Heart disease in her maternal grandmother; Learning disabilities in her father and son; Lung cancer in her maternal grandmother; Mental illness in her father; Pancreatic cancer in her maternal grandfather; Thyroid disease in her maternal grandfather and mother; Vision loss in her paternal grandmother. She was adopted.      Allergies:  Allergies   Allergen Reactions    Avocado Anaphylaxis    Prilosec [Omeprazole] Rash, Other (See Comments) and GI Intolerance     Achy joints    Promethazine Itching      - PHENERGAN -      Medications:  Prior to Admission medications    Medication Sig Start Date End Date Taking? Authorizing Provider   busPIRone (BUSPAR) 7.5 MG tablet Take 1 tablet by mouth 3 (Three) Times a Day As Needed (anxiety). 11/1/23  Yes Wendy Andrade APRN   butalbital-acetaminophen-caffeine (FIORICET, ESGIC) -40 MG per tablet Take 1 tablet by mouth Every 6 (Six) Hours As Needed for Migraine. 3/8/24  Yes Wendy Andrade APRN   Calcium Citrate-Vitamin D (CALCIUM CITRATE CHEWY BITE PO) Take 1 dose by mouth 3 (Three) Times a Day.   Yes Provider, MD Aníbal   clotrimazole-betamethasone (LOTRISONE) 1-0.05 % cream Apply 1 application  topically to the appropriate area as directed 2 (Two) Times a Day. 11/1/23  Yes Wendy Andrade APRN   cyanocobalamin  "1000 MCG/ML injection Inject 1 mL into the appropriate muscle as directed by prescriber Every 30 (Thirty) Days. VITAMIN B12 11/1/23  Yes Wendy Andrade APRN   Estrogens Conjugated (Premarin) 0.625 MG/GM vaginal cream 0.5 g inserted vaginally, twice weekly. 3/21/24  Yes Lucina Worrell APRN   lisdexamfetamine (Vyvanse) 30 MG capsule Take 1 capsule by mouth Every Morning 3/8/24  Yes ANGLE Raya DO   Miebo 1.338 GM/ML solution Administer 1 drop to both eyes 4 (Four) Times a Day. 3/28/24  Yes Aníbal Beltran MD   montelukast (SINGULAIR) 10 MG tablet Take 1 tablet by mouth Every Night.   Yes Aníbal Beltran MD   Multiple Vitamins-Minerals (BARIATRIC MULTIVITAMINS/IRON PO) Take 1 tablet by mouth Daily.   Yes Aníbal Beltran MD   Probiotic Product (FloraVantage) capsule Take 1 each by mouth Daily. CHEWABLE   Yes Aníbal Beltran MD   sertraline (ZOLOFT) 100 MG tablet Take 1.5 tablets by mouth Daily. 3/8/24  Yes Wendy Andrade APRN   vitamin D (ERGOCALCIFEROL) 1.25 MG (88180 UT) capsule capsule Take 1 capsule by mouth 1 (One) Time Per Week. 3/8/24  Yes Wendy Andrade APRN       Objective     Vital Signs: /74 (BP Location: Left arm, Patient Position: Sitting, Cuff Size: Large Adult)   Pulse 80   Temp 97.6 °F (36.4 °C) (Temporal)   Ht 167.6 cm (65.98\")   Wt 113 kg (248 lb 12.8 oz)   SpO2 98%   BMI 40.18 kg/m²   Physical Exam  Vitals and nursing note reviewed.   Constitutional:       General: She is not in acute distress.     Appearance: She is obese. She is not ill-appearing or toxic-appearing.   HENT:      Head: Normocephalic and atraumatic.      Right Ear: A middle ear effusion is present.      Left Ear: A middle ear effusion is present.      Mouth/Throat:      Mouth: Mucous membranes are moist.      Pharynx: Oropharynx is clear.   Cardiovascular:      Rate and Rhythm: Normal rate and regular rhythm.      Pulses: Normal pulses.      Heart sounds: Normal " "heart sounds.   Pulmonary:      Effort: Pulmonary effort is normal.      Breath sounds: No wheezing, rhonchi or rales.   Abdominal:      General: Bowel sounds are normal. There is no distension.      Palpations: Abdomen is soft.      Tenderness: There is no abdominal tenderness.   Musculoskeletal:         General: No swelling or tenderness. Normal range of motion.      Cervical back: Normal range of motion and neck supple. No tenderness.   Skin:     General: Skin is warm and dry.      Findings: No erythema or rash.   Neurological:      General: No focal deficit present.      Mental Status: She is alert and oriented to person, place, and time.   Psychiatric:         Mood and Affect: Mood normal.         Behavior: Behavior normal.         Thought Content: Thought content normal.         Judgment: Judgment normal.       Results Reviewed:  Adult ADHD self-report scale reviewed-improvement noted.    Assessment / Plan     Assessment/Plan:  Diagnoses and all orders for this visit:    1. Anxiety and depression (Primary)    2. Adult ADHD    3. Chronic IMAN (middle ear effusion), bilateral    4. Seasonal allergic rhinitis due to pollen       Patient presents to the office today for a 1 month follow-up.  During her last office visit, she did have complaints of worsening anxiety.  We did increase her Zoloft from 100 mg to 150 mg, and she feels that this change has been beneficial.  She had also complained of worsening ADHD symptoms.  She had previously been on medication but had stopped taking methylphenidate over a year ago due to supply issues.  I did restart her on Vyvanse, as she had taken this in the past with success as well.  She feels that this has been a very positive change in her symptoms are much better controlled.  She tells me that she feels as though she is in a \"much better place\" overall with the above medication changes.  Patient is up-to-date on urine drug screen and CSA.    Patient does have complaints of " left ear fullness and intermittent pain since coming back from vacation in [place] last week.  She does have some frontal sinus pressure as well.  She denies any fever/chills, sore throat, cough or chest congestion.  She has been compliant with montelukast.  She does have bilateral middle ear effusions noted with serous fluid.  The right actually seems to be more full than the left.  We discussed adding Flonase along with her Singulair to see if this helps with symptoms.  She is to call the office for any worsening or no improvement.    Return in about 3 months (around 7/9/2024) for Recheck- ADHD. unless patient needs to be seen sooner or acute issues arise.    I have discussed the patient results/orders and and plan/recommendation with them at today's visit.      Wendy Andrade, EZIO   04/09/2024        Answers submitted by the patient for this visit:  Other (Submitted on 4/9/2024)  Please describe your symptoms.: Follow Up  Have you had these symptoms before?: Yes  How long have you been having these symptoms?: Greater than 2 weeks  Please list any medications you are currently taking for this condition.: Vyvanse  Primary Reason for Visit (Submitted on 4/9/2024)  What is the primary reason for your visit?: Other

## 2024-05-13 ENCOUNTER — OFFICE VISIT (OUTPATIENT)
Dept: BARIATRICS/WEIGHT MGMT | Facility: CLINIC | Age: 44
End: 2024-05-13
Payer: COMMERCIAL

## 2024-05-13 VITALS
OXYGEN SATURATION: 97 % | DIASTOLIC BLOOD PRESSURE: 90 MMHG | WEIGHT: 242.8 LBS | HEIGHT: 66 IN | HEART RATE: 72 BPM | BODY MASS INDEX: 39.02 KG/M2 | SYSTOLIC BLOOD PRESSURE: 138 MMHG | TEMPERATURE: 97.7 F

## 2024-05-13 DIAGNOSIS — Z98.84 STATUS POST LAPAROSCOPIC SLEEVE GASTRECTOMY: ICD-10-CM

## 2024-05-13 DIAGNOSIS — I10 HYPERTENSION, UNSPECIFIED TYPE: ICD-10-CM

## 2024-05-13 DIAGNOSIS — E66.01 CLASS 2 SEVERE OBESITY DUE TO EXCESS CALORIES WITH SERIOUS COMORBIDITY AND BODY MASS INDEX (BMI) OF 39.0 TO 39.9 IN ADULT: Primary | ICD-10-CM

## 2024-05-13 PROCEDURE — 99213 OFFICE O/P EST LOW 20 MIN: CPT | Performed by: NURSE PRACTITIONER

## 2024-05-13 NOTE — ASSESSMENT & PLAN NOTE
Patient's (Body mass index is 39.19 kg/m².) indicates that they are morbidly/severely obese (BMI > 40 or > 35 with obesity - related health condition) with health conditions that include hypertension . Weight is improving with treatment. BMI  is above average; BMI management plan is completed. We discussed portion control and increasing exercise.

## 2024-05-13 NOTE — PROGRESS NOTES
"Patient Care Team:  Wendy Andrade APRN as PCP - General (Nurse Practitioner)    Chief Complaint: S/P Sleeve Gastrectomy, 1 year    Subjective     Allison Mckeon is POD 1 year  from a sleeve gastrectomy. Patient admits to eating around 4 meals per day and denies to grazing in between meals. She admits to drinking at least 64 ounces or more of fluid each day and intaking at least 65 grams of protein.. She is currently exercising: some but is having difficulty due to knee pain. She states that she has gone without soda intake and denies  nicotine use. Patient has lost 17  pound since her last appointment with us.   Admits to having a lot of personal stressors and is going to therapy every 3 weeks to assist with anxiety reduction.   She states she is measuring and not exceeding 1/2 c at all meals.     Review of Systems:     Review of Systems   Constitutional: Negative.  Positive for fatigue.   Respiratory: Negative.     Cardiovascular: Negative.    Gastrointestinal: Negative.    Endocrine: Negative.    Musculoskeletal: Negative.  Positive for arthralgias.        Knee pain    Psychiatric/Behavioral: Negative.          Objective     Vital Signs  /90 (BP Location: Right arm, Patient Position: Sitting, Cuff Size: Adult)   Pulse 72   Temp 97.7 °F (36.5 °C)   Ht 167.6 cm (66\")   Wt 110 kg (242 lb 12.8 oz)   SpO2 97%   BMI 39.19 kg/m²          Physical Exam:  Physical Exam  Vitals reviewed.   Constitutional:       Appearance: She is obese.   Cardiovascular:      Rate and Rhythm: Normal rate and regular rhythm.   Pulmonary:      Effort: Pulmonary effort is normal.   Abdominal:      General: Bowel sounds are normal.      Palpations: Abdomen is soft.   Musculoskeletal:         General: Normal range of motion.   Skin:     General: Skin is warm and dry.   Neurological:      Mental Status: She is alert and oriented to person, place, and time.   Psychiatric:         Mood and Affect: Mood normal.         Behavior: " Behavior normal.           Results Review:    Labs reviewed    Medication Reviewed:   Current Outpatient Medications   Medication Sig Dispense Refill    butalbital-acetaminophen-caffeine (FIORICET, ESGIC) -40 MG per tablet Take 1 tablet by mouth Every 6 (Six) Hours As Needed for Migraine. 30 tablet 0    Calcium Citrate-Vitamin D (CALCIUM CITRATE CHEWY BITE PO) Take 1 dose by mouth 3 (Three) Times a Day.      clotrimazole-betamethasone (LOTRISONE) 1-0.05 % cream Apply 1 application  topically to the appropriate area as directed 2 (Two) Times a Day. 90 g 2    cyanocobalamin 1000 MCG/ML injection Inject 1 mL into the appropriate muscle as directed by prescriber Every 30 (Thirty) Days. VITAMIN B12 3 mL 2    Estrogens Conjugated (Premarin) 0.625 MG/GM vaginal cream 0.5 g inserted vaginally, twice weekly. 90 g 2    Miebo 1.338 GM/ML solution Administer 1 drop to both eyes 4 (Four) Times a Day.      montelukast (SINGULAIR) 10 MG tablet Take 1 tablet by mouth Every Night.      Multiple Vitamins-Minerals (BARIATRIC MULTIVITAMINS/IRON PO) Take 1 tablet by mouth Daily.      Probiotic Product (FloraVantage) capsule Take 1 each by mouth Daily. CHEWABLE      sertraline (ZOLOFT) 100 MG tablet Take 1.5 tablets by mouth Daily. 135 tablet 3    vitamin D (ERGOCALCIFEROL) 1.25 MG (94444 UT) capsule capsule Take 1 capsule by mouth 1 (One) Time Per Week. 12 capsule 3     No current facility-administered medications for this visit.       Assessment & Plan    POD  1 year  from Sleeve Gastrectomy.  Diagnoses and all orders for this visit:    1. Class 2 severe obesity due to excess calories with serious comorbidity and body mass index (BMI) of 39.0 to 39.9 in adult (Primary)  Assessment & Plan:  Patient's (Body mass index is 39.19 kg/m².) indicates that they are morbidly/severely obese (BMI > 40 or > 35 with obesity - related health condition) with health conditions that include hypertension . Weight is improving with treatment. BMI   is above average; BMI management plan is completed. We discussed portion control and increasing exercise.       2. Status post laparoscopic sleeve gastrectomy  Comments:  Continue post opertive meal plan    3. Hypertension, unspecified type           Allison Mckeon and I discussed the importance of changing behavior for consistent and successful weight loss. We first reviewed again the definition of a meal which is defined as portion sizes less than a half a cup and those portions incorporating a protein. Specifically the protein should fill half of that volume. I also explained that she should attempt to consume 4 meals as defined above daily and to avoid snacking or grazing. She should also be mindful of adequate hydration by consuming at least 64 oz of water daily and incorporation of a regular exercise regimen.     I discussed the importance of taking her multivitamins as directed.  She is to return to our office 3 months or as needed.    Goals for this month are:   Continue with 4 meals and a protein bar (as needed) and continue protein shake in coffee  Log meals and send food journal into dietitian so we can assist with checking fiber and protein intake, if hunger continues we may increase portion size to 1 cup instead of 1/2 c.  Minimum 25 grams of fiber     Manda Benton, EZIO  05/13/24  11:00 CDT

## 2024-06-05 DIAGNOSIS — F90.9 ADULT ADHD: Primary | ICD-10-CM

## 2024-06-05 RX ORDER — LISDEXAMFETAMINE DIMESYLATE 30 MG/1
30 CAPSULE ORAL EVERY MORNING
Qty: 30 CAPSULE | Refills: 0 | Status: SHIPPED | OUTPATIENT
Start: 2024-07-14

## 2024-06-05 RX ORDER — LISDEXAMFETAMINE DIMESYLATE 30 MG/1
30 CAPSULE ORAL EVERY MORNING
Qty: 30 CAPSULE | Refills: 0 | Status: SHIPPED | OUTPATIENT
Start: 2024-06-17

## 2024-06-19 DIAGNOSIS — I10 PRIMARY HYPERTENSION: ICD-10-CM

## 2024-06-19 RX ORDER — LOSARTAN POTASSIUM 100 MG/1
100 TABLET ORAL DAILY
Qty: 90 TABLET | Refills: 3 | OUTPATIENT
Start: 2024-06-19

## 2024-07-02 ENCOUNTER — OFFICE VISIT (OUTPATIENT)
Dept: INTERNAL MEDICINE | Facility: CLINIC | Age: 44
End: 2024-07-02
Payer: COMMERCIAL

## 2024-07-02 VITALS
WEIGHT: 242 LBS | BODY MASS INDEX: 38.89 KG/M2 | TEMPERATURE: 96.8 F | HEIGHT: 66 IN | OXYGEN SATURATION: 98 % | DIASTOLIC BLOOD PRESSURE: 78 MMHG | HEART RATE: 75 BPM | SYSTOLIC BLOOD PRESSURE: 110 MMHG

## 2024-07-02 DIAGNOSIS — E55.9 VITAMIN D INSUFFICIENCY: ICD-10-CM

## 2024-07-02 DIAGNOSIS — F90.9 ADULT ADHD: Primary | ICD-10-CM

## 2024-07-02 DIAGNOSIS — M79.644 PAIN OF FINGER OF RIGHT HAND: ICD-10-CM

## 2024-07-02 DIAGNOSIS — F41.9 ANXIETY AND DEPRESSION: ICD-10-CM

## 2024-07-02 DIAGNOSIS — Z86.69 HISTORY OF MIGRAINE: ICD-10-CM

## 2024-07-02 DIAGNOSIS — F32.A ANXIETY AND DEPRESSION: ICD-10-CM

## 2024-07-02 PROCEDURE — 99214 OFFICE O/P EST MOD 30 MIN: CPT | Performed by: NURSE PRACTITIONER

## 2024-07-02 RX ORDER — ERGOCALCIFEROL 1.25 MG/1
50000 CAPSULE ORAL WEEKLY
Qty: 12 CAPSULE | Refills: 3 | Status: SHIPPED | OUTPATIENT
Start: 2024-07-02

## 2024-07-02 RX ORDER — BUTALBITAL, ACETAMINOPHEN AND CAFFEINE 50; 325; 40 MG/1; MG/1; MG/1
1 TABLET ORAL EVERY 6 HOURS PRN
Qty: 90 TABLET | Refills: 0 | Status: SHIPPED | OUTPATIENT
Start: 2024-07-02

## 2024-07-02 NOTE — PROGRESS NOTES
Subjective     Chief Complaint:  Follow-up ADHD.  Right fourth finger pain.    HPI:  Patient presents to the office today for a follow-up regarding ADHD management with Vyvanse.  She has been having some difficulty with pain of the right fourth finger since about April.  She is unsure of any mechanism of injury.  Please see assessment and plan below.    Patient's PMR from outside medical facility reviewed and noted.    Past Medical History:   Past Medical History:   Diagnosis Date    ADHD (attention deficit hyperactivity disorder)     Allergic     Avocado; Anaphylaxis    Allergic rhinitis     Anxiety     Asthma     Cholelithiasis     Colon polyp     Depression     Disease of thyroid gland 2012    Hashimotos    Fibromyalgia, primary     GERD (gastroesophageal reflux disease)     Glaucoma     Elevated Pressure    Headache     Hernia     Hypertension     Obesity     Personal history of COVID-19 2023    PONV (postoperative nausea and vomiting)     Sleep apnea     Does Not use CPAP     Past Surgical History:  Past Surgical History:   Procedure Laterality Date    ADENOIDECTOMY       SECTION      CHOLECYSTECTOMY      COLONOSCOPY      D & C WITH SUCTION      x2    ENDOMETRIAL ABLATION      ENDOSCOPY N/A 2023    Procedure: ESOPHAGOGASTRODUODENOSCOPY WITH ANESTHESIA;  Surgeon: Binh Michele MD;  Location: [facility] ENDOSCOPY;  Service: General;  Laterality: N/A;  pre screen  post esophagitis       GASTRIC SLEEVE LAPAROSCOPIC N/A 2023    Procedure: GASTRIC SLEEVE LAPAROSCOPIC AND HIATAL HERNIA REPAIR WITH [employer] ROBOT;  Surgeon: Binh Michele MD;  Location: [facility] OR;  Service: Robotics - DaVinci;  Laterality: N/A;    SINUS SURGERY  2018    septoplasty with turbinates    TONSILLECTOMY      TUBAL ABDOMINAL LIGATION      TUBAL ABDOMINAL LIGATION      tubal reversal     VAGINAL HYSTERECTOMY      still has tubes and ovaries      Social History:  reports that she has never smoked. She has never used smokeless tobacco. She reports current alcohol use of about 2.0 standard drinks of alcohol per week. She reports that she does not use drugs.    Family History: family history includes Asthma in her mother; Breast cancer (age of onset: 37) in her maternal aunt; Cancer in her maternal grandfather, maternal grandmother, and paternal grandmother; Colon cancer in her maternal grandmother; Depression in her father; Diabetes in her paternal grandmother; Early death in her paternal aunt and paternal grandfather; Heart disease in her maternal grandmother; Learning disabilities in her father and son; Lung cancer in her maternal grandmother; Mental illness in her father; Pancreatic cancer in her maternal grandfather; Thyroid disease in her maternal grandfather and mother; Vision loss in her paternal grandmother. She was adopted.      Allergies:  Allergies   Allergen Reactions    Avocado Anaphylaxis    Prilosec [Omeprazole] Rash, Other (See Comments) and GI Intolerance     Achy joints    Promethazine Itching      - PHENERGAN -      Medications:  Prior to Admission medications    Medication Sig Start Date End Date Taking? Authorizing Provider   butalbital-acetaminophen-caffeine (FIORICET, ESGIC) -40 MG per tablet Take 1 tablet by mouth Every 6 (Six) Hours As Needed for Migraine. 7/2/24  Yes Wendy Andrade APRN   Calcium Citrate-Vitamin D (CALCIUM CITRATE CHEWY BITE PO) Take 1 dose by mouth 3 (Three) Times a Day.   Yes Provider, MD Aníbal   clotrimazole-betamethasone (LOTRISONE) 1-0.05 % cream Apply 1 application  topically to the appropriate area as directed 2 (Two) Times a Day. 11/1/23  Yes Wendy Andrade APRN   cyanocobalamin 1000 MCG/ML injection Inject 1 mL into the appropriate muscle as directed by prescriber Every 30 (Thirty) Days. VITAMIN B12 11/1/23  Yes Wendy Andrade APRN   Estrogens Conjugated (Premarin) 0.625 MG/GM  "vaginal cream 0.5 g inserted vaginally, twice weekly. 3/21/24  Yes Lucina Worrell APRN   lisdexamfetamine (VYVANSE) 30 MG capsule Take 1 capsule by mouth Every Morning 6/17/24  Yes ANGLE Raya DO   lisdexamfetamine (Vyvanse) 30 MG capsule Take 1 capsule by mouth Every Morning 7/14/24  Yes ANGLE Raya DO   Miebo 1.338 GM/ML solution Administer 1 drop to both eyes 4 (Four) Times a Day. 3/28/24  Yes ProviderAníbal MD   montelukast (SINGULAIR) 10 MG tablet Take 1 tablet by mouth Every Night.   Yes ProviderAníbal MD   Multiple Vitamins-Minerals (BARIATRIC MULTIVITAMINS/IRON PO) Take 1 tablet by mouth Daily.   Yes ProviderAníbal MD   Probiotic Product (FloraVantage) capsule Take 1 each by mouth Daily. CHEWABLE   Yes ProviderAníbal MD   sertraline (ZOLOFT) 100 MG tablet Take 1.5 tablets by mouth Daily. 3/8/24  Yes Wendy Andrade APRN   vitamin D (ERGOCALCIFEROL) 1.25 MG (88196 UT) capsule capsule Take 1 capsule by mouth 1 (One) Time Per Week. 7/2/24  Yes Wendy Andrade APRN   butalbital-acetaminophen-caffeine (FIORICET, ESGIC) -40 MG per tablet Take 1 tablet by mouth Every 6 (Six) Hours As Needed for Migraine. 3/8/24 7/2/24 Yes Wendy Andrade APRN   vitamin D (ERGOCALCIFEROL) 1.25 MG (60385 UT) capsule capsule Take 1 capsule by mouth 1 (One) Time Per Week. 3/8/24 7/2/24 Yes Wendy Andrade APRN       Objective     Vital Signs: /78 (BP Location: Left arm, Patient Position: Sitting, Cuff Size: Adult)   Pulse 75   Temp 96.8 °F (36 °C) (Temporal)   Ht 167.6 cm (66\")   Wt 110 kg (242 lb)   SpO2 98%   BMI 39.06 kg/m²   Physical Exam  Vitals and nursing note reviewed.   Constitutional:       General: She is not in acute distress.     Appearance: She is obese. She is not ill-appearing or toxic-appearing.   HENT:      Head: Normocephalic and atraumatic.      Mouth/Throat:      Mouth: Mucous membranes are moist.      Pharynx: Oropharynx " is clear.   Cardiovascular:      Rate and Rhythm: Normal rate and regular rhythm.      Pulses: Normal pulses.      Heart sounds: Normal heart sounds.   Pulmonary:      Effort: Pulmonary effort is normal.      Breath sounds: No wheezing, rhonchi or rales.   Abdominal:      General: Bowel sounds are normal. There is no distension.      Palpations: Abdomen is soft.      Tenderness: There is no abdominal tenderness.   Musculoskeletal:         General: No swelling or tenderness.      Right hand: Bony tenderness present. Decreased range of motion.      Cervical back: Normal range of motion and neck supple. No tenderness.      Comments: Right 4th digit decreased ROM and tenderness of DIP   Skin:     General: Skin is warm and dry.      Findings: No erythema or rash.   Neurological:      General: No focal deficit present.      Mental Status: She is alert and oriented to person, place, and time.   Psychiatric:         Mood and Affect: Mood normal.         Behavior: Behavior normal.         Thought Content: Thought content normal.         Judgment: Judgment normal.     Results Reviewed:  Office Visit with Manda Benton APRN (05/13/2024)     Assessment / Plan     Assessment/Plan:  Diagnoses and all orders for this visit:    1. Adult ADHD (Primary)    2. History of migraine  -     butalbital-acetaminophen-caffeine (FIORICET, ESGIC) -40 MG per tablet; Take 1 tablet by mouth Every 6 (Six) Hours As Needed for Migraine.  Dispense: 90 tablet; Refill: 0    3. Anxiety and depression    4. Pain of finger of right hand    5. Vitamin D insufficiency  -     vitamin D (ERGOCALCIFEROL) 1.25 MG (32395 UT) capsule capsule; Take 1 capsule by mouth 1 (One) Time Per Week.  Dispense: 12 capsule; Refill: 3       Patient presents to the office today for a follow-up.  She was restarted on Vyvanse in March of this year due to worsening ADHD symptoms.  She had taken Vyvanse in the past with good success and indicates that this dosage does seem  to be working fairly well for her.  She would be interested in increasing the dosage slightly but is aware that the pharmacy she uses does not have the next available dosage.  She feels as though the current dosage of her medication does work better than not having medication available at all so would like to continue on this dose for now.  Please note that apparently she used to take a higher dosage of this, is unsure of the exact dosage but did experience hyperkalemia and leg cramping with higher dose.  She has taken methylphenidate and Adderall in the past as well.  Vyvanse does seem to work better than methylphenidate.  She is unsure of the efficacy of Adderall as it has been so long since she has taken it.  Patient is up-to-date on urine drug screen and controlled substance agreement.    At a previous office visit in March we had also increased her Zoloft from 100 mg to 150 mg as well and this change has been effective for her.  She has also found a new therapist who she is very happy with.    Patient indicates having pain in the right fourth finger since late April or early May.  She is unsure of any exact mechanism of injury, but has noted consistent pain.  This is located in the DIP joint and hurts with trying to  and with bending her finger.  She did try splinting for a few days.  She has not necessarily noted any swelling, overlying skin discoloration, numbness/tingling.  We did discuss obtaining an x-ray today however I do not necessarily feel that this would  at this point in time so would encourage her to continue splinting as needed for now.  She will let me know if any new symptoms occur.    Refill for Fioricet sent, patient does utilize this as needed for migraines, which she uses sparingly.    Return in about 3 months (around 10/2/2024) for Recheck ADHD- follow-up with Ebony. unless patient needs to be seen sooner or acute issues arise.    I have discussed the patient  results/orders and and plan/recommendation with them at today's visit.      EZIO Hardin   07/02/2024        Answers submitted by the patient for this visit:  Other (Submitted on 7/2/2024)  Please describe your symptoms.: Follow up  Have you had these symptoms before?: Yes  How long have you been having these symptoms?: Greater than 2 weeks  Primary Reason for Visit (Submitted on 7/2/2024)  What is the primary reason for your visit?: Other

## 2024-08-15 DIAGNOSIS — F41.9 ANXIETY: ICD-10-CM

## 2024-08-15 RX ORDER — SERTRALINE HYDROCHLORIDE 100 MG/1
150 TABLET, FILM COATED ORAL DAILY
Qty: 135 TABLET | Refills: 2 | Status: SHIPPED | OUTPATIENT
Start: 2024-08-15

## 2024-08-29 ENCOUNTER — OFFICE VISIT (OUTPATIENT)
Dept: INTERNAL MEDICINE | Facility: CLINIC | Age: 44
End: 2024-08-29
Payer: COMMERCIAL

## 2024-08-29 ENCOUNTER — HOSPITAL ENCOUNTER (OUTPATIENT)
Dept: GENERAL RADIOLOGY | Facility: HOSPITAL | Age: 44
Discharge: HOME | End: 2024-08-29
Admitting: NURSE PRACTITIONER
Payer: COMMERCIAL

## 2024-08-29 VITALS
TEMPERATURE: 97.8 F | WEIGHT: 246 LBS | BODY MASS INDEX: 39.53 KG/M2 | SYSTOLIC BLOOD PRESSURE: 116 MMHG | HEART RATE: 88 BPM | OXYGEN SATURATION: 97 % | HEIGHT: 66 IN | DIASTOLIC BLOOD PRESSURE: 76 MMHG

## 2024-08-29 DIAGNOSIS — R42 DIZZINESS: ICD-10-CM

## 2024-08-29 DIAGNOSIS — J01.90 ACUTE NON-RECURRENT SINUSITIS, UNSPECIFIED LOCATION: ICD-10-CM

## 2024-08-29 DIAGNOSIS — H65.93 MEE (MIDDLE EAR EFFUSION), BILATERAL: ICD-10-CM

## 2024-08-29 DIAGNOSIS — Z98.84 STATUS POST GASTRIC BYPASS FOR OBESITY: ICD-10-CM

## 2024-08-29 DIAGNOSIS — F90.9 ADULT ADHD: ICD-10-CM

## 2024-08-29 DIAGNOSIS — M25.552 LEFT HIP PAIN: ICD-10-CM

## 2024-08-29 DIAGNOSIS — I95.1 ORTHOSTASIS: ICD-10-CM

## 2024-08-29 DIAGNOSIS — M25.552 LEFT HIP PAIN: Primary | ICD-10-CM

## 2024-08-29 PROCEDURE — 73502 X-RAY EXAM HIP UNI 2-3 VIEWS: CPT

## 2024-08-29 PROCEDURE — 99214 OFFICE O/P EST MOD 30 MIN: CPT | Performed by: NURSE PRACTITIONER

## 2024-08-29 PROCEDURE — 96372 THER/PROPH/DIAG INJ SC/IM: CPT | Performed by: NURSE PRACTITIONER

## 2024-08-29 RX ORDER — DEXTROAMPHETAMINE SACCHARATE, AMPHETAMINE ASPARTATE, DEXTROAMPHETAMINE SULFATE AND AMPHETAMINE SULFATE 2.5; 2.5; 2.5; 2.5 MG/1; MG/1; MG/1; MG/1
10 TABLET ORAL 2 TIMES DAILY
Qty: 60 TABLET | Refills: 0 | Status: SHIPPED | OUTPATIENT
Start: 2024-08-29

## 2024-08-29 RX ORDER — METHYLPREDNISOLONE 4 MG
TABLET, DOSE PACK ORAL
Qty: 21 TABLET | Refills: 0 | Status: SHIPPED | OUTPATIENT
Start: 2024-08-29

## 2024-08-29 RX ORDER — MONTELUKAST SODIUM 10 MG/1
10 TABLET ORAL NIGHTLY
Qty: 30 TABLET | Refills: 5 | Status: SHIPPED | OUTPATIENT
Start: 2024-08-29

## 2024-08-29 RX ORDER — TRIAMCINOLONE ACETONIDE 40 MG/ML
40 INJECTION, SUSPENSION INTRA-ARTICULAR; INTRAMUSCULAR ONCE
Status: COMPLETED | OUTPATIENT
Start: 2024-08-29 | End: 2024-08-29

## 2024-08-29 RX ADMIN — TRIAMCINOLONE ACETONIDE 40 MG: 40 INJECTION, SUSPENSION INTRA-ARTICULAR; INTRAMUSCULAR at 10:33

## 2024-08-29 NOTE — PROGRESS NOTES
Results discussed with patient via telephone.  Mild acetabular spurring noted.  Will complete steroids as directed.  If this has not helped or symptoms return after steroid therapy is complete, would likely need to refer to orthopedics.

## 2024-08-29 NOTE — PROGRESS NOTES
Subjective     Chief Complaint:  Left hip pain.  Dizziness.  ADHD    HPI:  Patient presents to the office today with a 1 month history of left hip pain.  She has also had a 1 week history of episodes of dizziness.  She also feels current dosage of Vyvanse is ineffective and inquires about switching medications.  Please see assessment and plan below.    Patient's PMR from outside medical facility reviewed and noted.    Past Medical History:   Past Medical History:   Diagnosis Date    ADHD (attention deficit hyperactivity disorder)     Allergic     Avocado; Anaphylaxis    Allergic rhinitis     Anxiety     Asthma     Cholelithiasis     Colon polyp     Depression     Disease of thyroid gland 2012    Hashimotos    Fibromyalgia, primary     GERD (gastroesophageal reflux disease)     Glaucoma     Elevated Pressure    Headache     Hernia     Hypertension     Obesity     Personal history of COVID-19 2023    PONV (postoperative nausea and vomiting)     Sleep apnea     Does Not use CPAP     Past Surgical History:  Past Surgical History:   Procedure Laterality Date    ADENOIDECTOMY       SECTION      CHOLECYSTECTOMY      COLONOSCOPY      D & C WITH SUCTION      x2    ENDOMETRIAL ABLATION      ENDOSCOPY N/A 2023    Procedure: ESOPHAGOGASTRODUODENOSCOPY WITH ANESTHESIA;  Surgeon: Binh Michele MD;  Location: [facility] ENDOSCOPY;  Service: General;  Laterality: N/A;  pre screen  post esophagitis       GASTRIC SLEEVE LAPAROSCOPIC N/A 2023    Procedure: GASTRIC SLEEVE LAPAROSCOPIC AND HIATAL HERNIA REPAIR WITH [employer] ROBOT;  Surgeon: Binh Michele MD;  Location: [facility] OR;  Service: Robotics - DaVinci;  Laterality: N/A;    SINUS SURGERY  2018    septoplasty with turbinates    TONSILLECTOMY      TUBAL ABDOMINAL LIGATION      TUBAL ABDOMINAL LIGATION      tubal reversal     VAGINAL HYSTERECTOMY      still has tubes and ovaries      Social History:  reports that she has never smoked. She has never used smokeless tobacco. She reports current alcohol use of about 2.0 standard drinks of alcohol per week. She reports that she does not use drugs.    Family History: family history includes Asthma in her mother; Breast cancer (age of onset: 37) in her maternal aunt; Cancer in her maternal grandfather, maternal grandmother, and paternal grandmother; Colon cancer in her maternal grandmother; Depression in her father; Diabetes in her paternal grandmother; Early death in her paternal aunt and paternal grandfather; Heart disease in her maternal grandmother; Learning disabilities in her father and son; Lung cancer in her maternal grandmother; Mental illness in her father; Pancreatic cancer in her maternal grandfather; Thyroid disease in her maternal grandfather and mother; Vision loss in her paternal grandmother. She was adopted.      Allergies:  Allergies   Allergen Reactions    Avocado Anaphylaxis    Prilosec [Omeprazole] Rash, Other (See Comments) and GI Intolerance     Achy joints    Promethazine Itching      - PHENERGAN -      Medications:  Prior to Admission medications    Medication Sig Start Date End Date Taking? Authorizing Provider   butalbital-acetaminophen-caffeine (FIORICET, ESGIC) -40 MG per tablet Take 1 tablet by mouth Every 6 (Six) Hours As Needed for Migraine. 7/2/24  Yes Wendy Andrade APRN   Calcium Citrate-Vitamin D (CALCIUM CITRATE CHEWY BITE PO) Take 1 dose by mouth 3 (Three) Times a Day.   Yes Provider, MD Aníbal   clotrimazole-betamethasone (LOTRISONE) 1-0.05 % cream Apply 1 application  topically to the appropriate area as directed 2 (Two) Times a Day. 11/1/23  Yes Wendy Andrade APRN   cyanocobalamin 1000 MCG/ML injection Inject 1 mL into the appropriate muscle as directed by prescriber Every 30 (Thirty) Days. VITAMIN B12 11/1/23  Yes Wendy Andrade APRN   Estrogens Conjugated (Premarin) 0.625 MG/GM  "vaginal cream 0.5 g inserted vaginally, twice weekly. 3/21/24  Yes Lucina Worrell APRN   lisdexamfetamine (VYVANSE) 30 MG capsule Take 1 capsule by mouth Every Morning 6/17/24  Yes ANGLE Raya,    lisdexamfetamine (Vyvanse) 30 MG capsule Take 1 capsule by mouth Every Morning 7/14/24  Yes ANGLE Raya,    montelukast (SINGULAIR) 10 MG tablet Take 1 tablet by mouth Every Night. 8/29/24  Yes Wendy Andrade APRN   Multiple Vitamins-Minerals (BARIATRIC MULTIVITAMINS/IRON PO) Take 1 tablet by mouth Daily.   Yes Aníbal Beltran MD   Probiotic Product (FloraVantage) capsule Take 1 each by mouth Daily. CHEWABLE   Yes Aníbal Beltran MD   sertraline (ZOLOFT) 100 MG tablet Take 1.5 tablets by mouth Daily. 8/15/24  Yes Ebony Carreno APRN   vitamin D (ERGOCALCIFEROL) 1.25 MG (97002 UT) capsule capsule Take 1 capsule by mouth 1 (One) Time Per Week. 7/2/24  Yes Wendy Andrade APRN   Miebo 1.338 GM/ML solution Administer 1 drop to both eyes 4 (Four) Times a Day. 3/28/24 8/29/24 Yes Aníbal Beltran MD   montelukast (SINGULAIR) 10 MG tablet Take 1 tablet by mouth Every Night.  Patient not taking: Reported on 8/29/2024 8/29/24  Aníbal Beltran MD       Objective     Vital Signs: /76 (BP Location: Left arm, Patient Position: Sitting, Cuff Size: Large Adult)   Pulse 88   Temp 97.8 °F (36.6 °C) (Temporal)   Ht 167.6 cm (65.98\")   Wt 112 kg (246 lb)   SpO2 97%   BMI 39.72 kg/m²   Physical Exam  Vitals and nursing note reviewed.   Constitutional:       General: She is not in acute distress.     Appearance: She is obese. She is not ill-appearing or toxic-appearing.   HENT:      Head: Normocephalic and atraumatic.      Right Ear: A middle ear effusion is present. Tympanic membrane is injected. Tympanic membrane has decreased mobility.      Left Ear: A middle ear effusion is present. Tympanic membrane has decreased mobility.      Nose: Rhinorrhea present.      " Mouth/Throat:      Mouth: Mucous membranes are moist.      Pharynx: Oropharynx is clear.   Cardiovascular:      Rate and Rhythm: Normal rate and regular rhythm.      Pulses: Normal pulses.      Heart sounds: Normal heart sounds.   Pulmonary:      Effort: Pulmonary effort is normal.      Breath sounds: No wheezing, rhonchi or rales.   Abdominal:      General: Bowel sounds are normal. There is no distension.      Palpations: Abdomen is soft.      Tenderness: There is no abdominal tenderness.   Musculoskeletal:         General: No swelling or tenderness. Normal range of motion.      Cervical back: Normal range of motion and neck supple. No tenderness.        Legs:       Comments: Non tender to palpation, but patient indicates this is the area she hurts when switching from seated to standing positions and when walking.   Skin:     General: Skin is warm and dry.      Findings: No erythema or rash.   Neurological:      General: No focal deficit present.      Mental Status: She is alert and oriented to person, place, and time.   Psychiatric:         Mood and Affect: Mood normal.         Behavior: Behavior normal.         Thought Content: Thought content normal.         Judgment: Judgment normal.       Results Reviewed:  No new results to review at this time.    Assessment / Plan     Assessment/Plan:  Diagnoses and all orders for this visit:    1. Left hip pain (Primary)  -     XR Hip With or Without Pelvis 2 - 3 View Left; Future  -     triamcinolone acetonide (KENALOG-40) injection 40 mg  -     methylPREDNISolone (MEDROL) 4 MG dose pack; Take as directed on package instructions.  Dispense: 21 tablet; Refill: 0    2. Status post gastric bypass for obesity  -     CBC & Differential  -     Comprehensive metabolic panel    3. Dizziness    4. Orthostasis  -     CBC & Differential  -     Comprehensive metabolic panel    5. Acute non-recurrent sinusitis, unspecified location  -     montelukast (SINGULAIR) 10 MG tablet; Take 1  tablet by mouth Every Night.  Dispense: 30 tablet; Refill: 5    6. IMAN (middle ear effusion), bilateral    7. Adult ADHD       Patient endorses a 1 month history of left hip pain.  This is located posteriorly without radiation.  She states this typically does not bother her when she is resting, sitting, or even lying on her left side.  This typically only bothers her when she goes from a seated to standing position.  She states after walking for a little while it seems to improve.  She denies any pain with abduction or adduction of the hip.  She has had no injuries or falls prior to the onset of pain.  She denies lower back pain.  She denies any numbness/tingling, left leg weakness or pain.  She has tried using a TENS unit, heating pad and has had a couple of massages.  The first massage helped for a couple of days, but otherwise she has noted no improvement of pain.  Patient has been counseled not to take NSAIDs by her bariatric surgeon as she is status post gastric sleeve.  She does use Aleve very sparingly if needed for headaches.  Left hip x-ray to be performed today, will follow-up with these results and communicate with patient.  Will treat for possible bursitis versus sacroiliitis with a Kenalog injection followed by Medrol Dosepak. Patient to call the office for any worsening or no improvement of symptoms prior to next office visit.    Patient has also noted a 1 week history of random episodes of dizziness.  She initially did not notice this being positional, but states when she went from sitting to standing position in the office today she did have an episode of this.  Episodes are occurring daily, multiple times per day.  They typically only last for a few seconds and she does not seem to have any associated symptoms with this.  She denies any chest pain, shortness of breath, palpitations.  She apparently does have episodes like this before having panic attacks but does not feel particularly upset or more  anxious at this time.  She has been trying to increase her water intake.  Orthostatic blood pressures in the office today were positive with lying blood pressure of 118/78, sitting 126/84 and standing 106/72.  He does wear a compression stocking on the right leg.  Encourage slow position changes.  She does have complaints of sinus drainage in the last couple of days and does have bilateral middle ear effusions noted on exam, which are likely contributing to dizziness as well.  Hopeful that the steroids that she will be receiving for her hip pain will improve the middle ear effusions and dizziness as well.  We did discuss taking her Singulair more regularly as she typically only takes this in the spring.  Check CBC and CMP today to ensure no other underlying issues could be causing dizziness.    Patient indicates Vyvanse does not seem to be working for her any longer.  She has taken higher dosages of this in the past and unfortunately experienced hypokalemia and leg cramping with higher dose.  She has taken methylphenidate and Adderall in the past.  Vyvanse worked better for her than methylphenidate.  It has been many years since she has taken Adderall so is unsure of the efficacy of this but would be willing to try this again.  We discussed extended release versus immediate release options and feel that she would tolerate immediate release better.  I have pended prescription to the appropriate provider to review.  Up-to-date on urine drug screen.  Will need to update her CSA at her next office visit in October if this has been effective.    Return if symptoms worsen or fail to improve, for Next scheduled follow up. unless patient needs to be seen sooner or acute issues arise.    I have discussed the patient results/orders and and plan/recommendation with them at today's visit.      EZIO Hardin   08/29/2024        Answers submitted by the patient for this visit:  Other (Submitted on 8/29/2024)  Please  describe your symptoms.: Left Hip Pain, Occasional Dizziness  Have you had these symptoms before?: No  How long have you been having these symptoms?: Greater than 2 weeks  Primary Reason for Visit (Submitted on 8/29/2024)  What is the primary reason for your visit?: Other

## 2024-08-30 LAB
ALBUMIN SERPL-MCNC: 4.5 G/DL (ref 3.5–5.2)
ALBUMIN/GLOB SERPL: 2.1 G/DL
ALP SERPL-CCNC: 70 U/L (ref 39–117)
ALT SERPL-CCNC: 16 U/L (ref 1–33)
AST SERPL-CCNC: 15 U/L (ref 1–32)
BASOPHILS # BLD AUTO: 0.02 10*3/MM3 (ref 0–0.2)
BASOPHILS NFR BLD AUTO: 0.2 % (ref 0–1.5)
BILIRUB SERPL-MCNC: 0.3 MG/DL (ref 0–1.2)
BUN SERPL-MCNC: 14 MG/DL (ref 6–20)
BUN/CREAT SERPL: 20.3 (ref 7–25)
CALCIUM SERPL-MCNC: 9.1 MG/DL (ref 8.6–10.5)
CHLORIDE SERPL-SCNC: 102 MMOL/L (ref 98–107)
CO2 SERPL-SCNC: 27.7 MMOL/L (ref 22–29)
CREAT SERPL-MCNC: 0.69 MG/DL (ref 0.57–1)
EGFRCR SERPLBLD CKD-EPI 2021: 108.5 ML/MIN/1.73
EOSINOPHIL # BLD AUTO: 0.15 10*3/MM3 (ref 0–0.4)
EOSINOPHIL NFR BLD AUTO: 1.9 % (ref 0.3–6.2)
ERYTHROCYTE [DISTWIDTH] IN BLOOD BY AUTOMATED COUNT: 12.6 % (ref 12.3–15.4)
GLOBULIN SER CALC-MCNC: 2.1 GM/DL
GLUCOSE SERPL-MCNC: 87 MG/DL (ref 65–99)
HCT VFR BLD AUTO: 43.5 % (ref 34–46.6)
HGB BLD-MCNC: 14 G/DL (ref 12–15.9)
IMM GRANULOCYTES # BLD AUTO: 0.02 10*3/MM3 (ref 0–0.05)
IMM GRANULOCYTES NFR BLD AUTO: 0.2 % (ref 0–0.5)
LYMPHOCYTES # BLD AUTO: 2.38 10*3/MM3 (ref 0.7–3.1)
LYMPHOCYTES NFR BLD AUTO: 29.6 % (ref 19.6–45.3)
MCH RBC QN AUTO: 27.9 PG (ref 26.6–33)
MCHC RBC AUTO-ENTMCNC: 32.2 G/DL (ref 31.5–35.7)
MCV RBC AUTO: 86.7 FL (ref 79–97)
MONOCYTES # BLD AUTO: 0.6 10*3/MM3 (ref 0.1–0.9)
MONOCYTES NFR BLD AUTO: 7.5 % (ref 5–12)
NEUTROPHILS # BLD AUTO: 4.86 10*3/MM3 (ref 1.7–7)
NEUTROPHILS NFR BLD AUTO: 60.6 % (ref 42.7–76)
NRBC BLD AUTO-RTO: 0 /100 WBC (ref 0–0.2)
PLATELET # BLD AUTO: 278 10*3/MM3 (ref 140–450)
POTASSIUM SERPL-SCNC: 4.2 MMOL/L (ref 3.5–5.2)
PROT SERPL-MCNC: 6.6 G/DL (ref 6–8.5)
RBC # BLD AUTO: 5.02 10*6/MM3 (ref 3.77–5.28)
SODIUM SERPL-SCNC: 139 MMOL/L (ref 136–145)
WBC # BLD AUTO: 8.03 10*3/MM3 (ref 3.4–10.8)

## 2024-09-23 LAB
NCCN CRITERIA FLAG: ABNORMAL
TYRER CUZICK SCORE: 12.8

## 2024-09-25 ENCOUNTER — HOSPITAL ENCOUNTER (OUTPATIENT)
Dept: MAMMOGRAPHY | Facility: HOSPITAL | Age: 44
Discharge: HOME | End: 2024-09-25
Admitting: NURSE PRACTITIONER
Payer: COMMERCIAL

## 2024-09-25 DIAGNOSIS — Z12.31 BREAST CANCER SCREENING BY MAMMOGRAM: ICD-10-CM

## 2024-09-25 PROCEDURE — 77063 BREAST TOMOSYNTHESIS BI: CPT

## 2024-09-25 PROCEDURE — 77067 SCR MAMMO BI INCL CAD: CPT

## 2024-09-28 DIAGNOSIS — Z13.79 GENETIC TESTING: Primary | ICD-10-CM

## 2024-10-02 ENCOUNTER — OFFICE VISIT (OUTPATIENT)
Dept: INTERNAL MEDICINE | Facility: CLINIC | Age: 44
End: 2024-10-02
Payer: COMMERCIAL

## 2024-10-02 VITALS
HEART RATE: 68 BPM | HEIGHT: 66 IN | BODY MASS INDEX: 40.02 KG/M2 | DIASTOLIC BLOOD PRESSURE: 74 MMHG | OXYGEN SATURATION: 98 % | WEIGHT: 249 LBS | RESPIRATION RATE: 16 BRPM | TEMPERATURE: 97.7 F | SYSTOLIC BLOOD PRESSURE: 110 MMHG

## 2024-10-02 DIAGNOSIS — F90.9 ADULT ADHD: Primary | ICD-10-CM

## 2024-10-02 DIAGNOSIS — E66.01 CLASS 3 SEVERE OBESITY WITH BODY MASS INDEX (BMI) OF 40.0 TO 44.9 IN ADULT, UNSPECIFIED OBESITY TYPE, UNSPECIFIED WHETHER SERIOUS COMORBIDITY PRESENT: ICD-10-CM

## 2024-10-02 DIAGNOSIS — E53.8 VITAMIN B12 DEFICIENCY: ICD-10-CM

## 2024-10-02 DIAGNOSIS — M25.552 LEFT HIP PAIN: ICD-10-CM

## 2024-10-02 DIAGNOSIS — E66.813 CLASS 3 SEVERE OBESITY WITH BODY MASS INDEX (BMI) OF 40.0 TO 44.9 IN ADULT, UNSPECIFIED OBESITY TYPE, UNSPECIFIED WHETHER SERIOUS COMORBIDITY PRESENT: ICD-10-CM

## 2024-10-02 PROCEDURE — 99214 OFFICE O/P EST MOD 30 MIN: CPT

## 2024-10-02 RX ORDER — DEXTROAMPHETAMINE SACCHARATE, AMPHETAMINE ASPARTATE, DEXTROAMPHETAMINE SULFATE AND AMPHETAMINE SULFATE 2.5; 2.5; 2.5; 2.5 MG/1; MG/1; MG/1; MG/1
10 TABLET ORAL 2 TIMES DAILY
Qty: 60 TABLET | Refills: 0 | Status: SHIPPED | OUTPATIENT
Start: 2024-10-02

## 2024-10-02 NOTE — PROGRESS NOTES
Subjective     Chief Complaint:  Follow-up ADHD, hip pain    HPI:  Patient presents today for follow-up on ADHD and hip pain. Please see assessment and plan below.    Past Medical History:   Past Medical History:   Diagnosis Date   • ADHD (attention deficit hyperactivity disorder)    • Allergic 1996    Avocado; Anaphylaxis   • Allergic rhinitis    • Anxiety    • Asthma    • Cholelithiasis    • Colon polyp    • Depression    • Disease of thyroid gland     Hashimotos   • Fibromyalgia, primary    • GERD (gastroesophageal reflux disease)    • Glaucoma     Elevated Pressure   • Headache    • Hernia    • Hypertension    • Obesity    • Personal history of COVID-19 2023   • PONV (postoperative nausea and vomiting)    • Sleep apnea     Does Not use CPAP     Past Surgical History:  Past Surgical History:   Procedure Laterality Date   • ADENOIDECTOMY     •  SECTION     • CHOLECYSTECTOMY     • COLONOSCOPY     • D & C WITH SUCTION      x2   • ENDOMETRIAL ABLATION     • ENDOSCOPY N/A 2023    Procedure: ESOPHAGOGASTRODUODENOSCOPY WITH ANESTHESIA;  Surgeon: Binh Michele MD;  Location: [facility] ENDOSCOPY;  Service: General;  Laterality: N/A;  pre screen  post esophagitis  DrJhon    • GASTRIC SLEEVE LAPAROSCOPIC N/A 2023    Procedure: GASTRIC SLEEVE LAPAROSCOPIC AND HIATAL HERNIA REPAIR WITH DAVINCI ROBOT;  Surgeon: Binh Michele MD;  Location: [facility] OR;  Service: Robotics - DaVinci;  Laterality: N/A;   • SINUS SURGERY  2018    septoplasty with turbinates   • TONSILLECTOMY     • TUBAL ABDOMINAL LIGATION     • TUBAL ABDOMINAL LIGATION      tubal reversal    • VAGINAL HYSTERECTOMY      still has tubes and ovaries       Allergies:  Allergies   Allergen Reactions   • Avocado Anaphylaxis   • Prilosec [Omeprazole] Rash, Other (See Comments) and GI Intolerance     Achy joints   • Cephalexin Provider Review Needed   • Codeine Provider  Review Needed   • Promethazine Itching      - PHENERGAN -      Medications:  Prior to Admission medications    Medication Sig Start Date End Date Taking? Authorizing Provider   amphetamine-dextroamphetamine (Adderall) 10 MG tablet Take 1 tablet by mouth 2 (Two) Times a Day. 8/29/24  Yes Victoriano Wheeler MD   butalbital-acetaminophen-caffeine (FIORICET, ESGIC) -40 MG per tablet Take 1 tablet by mouth Every 6 (Six) Hours As Needed for Migraine. 7/2/24  Yes Wendy Andrade APRN   Calcium Citrate-Vitamin D (CALCIUM CITRATE CHEWY BITE PO) Take 1 dose by mouth 3 (Three) Times a Day.   Yes Aníbal Beltran MD   clotrimazole-betamethasone (LOTRISONE) 1-0.05 % cream Apply 1 application  topically to the appropriate area as directed 2 (Two) Times a Day. 11/1/23  Yes Wendy Andrade APRN   cyanocobalamin 1000 MCG/ML injection Inject 1 mL into the appropriate muscle as directed by prescriber Every 30 (Thirty) Days. VITAMIN B12 11/1/23  Yes Wendy Andrade APRN   Estrogens Conjugated (Premarin) 0.625 MG/GM vaginal cream 0.5 g inserted vaginally, twice weekly. 3/21/24  Yes Lucina Worrell APRN   montelukast (SINGULAIR) 10 MG tablet Take 1 tablet by mouth Every Night. 8/29/24  Yes Wendy Andrade APRN   Multiple Vitamins-Minerals (BARIATRIC MULTIVITAMINS/IRON PO) Take 1 tablet by mouth Daily.   Yes Aníbal Beltran MD   Probiotic Product (FloraVantage) capsule Take 1 each by mouth Daily. CHEWABLE   Yes Aníbal Beltran MD   sertraline (ZOLOFT) 100 MG tablet Take 1.5 tablets by mouth Daily. 8/15/24  Yes Ebony Carreno APRN   vitamin D (ERGOCALCIFEROL) 1.25 MG (52629 UT) capsule capsule Take 1 capsule by mouth 1 (One) Time Per Week. 7/2/24  Yes Wendy Andrade APRN   lisdexamfetamine (VYVANSE) 30 MG capsule Take 1 capsule by mouth Every Morning 6/17/24   ANGLE Raya DO   lisdexamfetamine (Vyvanse) 30 MG capsule Take 1 capsule by mouth Every Morning 7/14/24   ANGLE Raya  "DO Seth   methylPREDNISolone (MEDROL) 4 MG dose pack Take as directed on package instructions. 8/29/24   Wendy Andrade APRN       Objective     Vital Signs: /74 (BP Location: Left arm, Patient Position: Sitting, Cuff Size: Large Adult)   Pulse 68   Temp 97.7 °F (36.5 °C) (Infrared)   Resp 16   Ht 167.6 cm (66\")   Wt 113 kg (249 lb)   SpO2 98%   BMI 40.19 kg/m²   Physical Exam  Vitals and nursing note reviewed.   Constitutional:       General: She is not in acute distress.     Appearance: Normal appearance. She is obese.   Cardiovascular:      Rate and Rhythm: Normal rate and regular rhythm.      Pulses: Normal pulses.      Heart sounds: Normal heart sounds. No murmur heard.  Pulmonary:      Effort: Pulmonary effort is normal. No respiratory distress.      Breath sounds: Normal breath sounds. No wheezing.   Skin:     Findings: No bruising, erythema or rash.   Neurological:      Mental Status: She is alert and oriented to person, place, and time. Mental status is at baseline.      Motor: No weakness.       Results Reviewed:  TSH 1.900 on 3/8.    CBC and CMP were normal on 8/29.    Assessment / Plan     Assessment/Plan:  Diagnoses and all orders for this visit:    1. Adult ADHD (Primary)    2. Left hip pain  -     Ambulatory Referral to Orthopedic Surgery    3. Vitamin B12 deficiency  -     Syringe, Disposable, 1 ML misc; Use 1 each 1 (One) Time Per Week.  Dispense: 25 each; Refill: 1    4. Class 3 severe obesity with body mass index (BMI) of 40.0 to 44.9 in adult, unspecified obesity type, unspecified whether serious comorbidity present       Patient presents today for follow-up on ADHD and hip pain.  At her last office visit on 8/29/2024 she was transitioned from Vyvanse to Adderall due to side effects.  She has been taking Adderall 10 mg twice daily.  She feels that this dose has been effective for her.  She does not feel that it suppresses her appetite as much as Vyvanse but otherwise is " tolerating the medication well.  Plan to continue at current dose.  UDS up-to-date.  CSA on file.    Patient complained of left hip pain at her last office visit.  She was given Kenalog injection and Medrol Dosepak.  X-ray was obtained which showed mild acetabular spurring.  Since symptoms have persisted we will place referral to orthopedic surgery.    Patient underwent gastric sleeve and hiatal hernia repair with Dr. Michele on 5/11.  Patient feels that her weight loss has plateaued.  She would like to lose another 20 pounds so she can undergo surgical intervention to remove excess skin.  She has continued following with bariatric surgery for weight loss efforts.  She has seen EZIO Darling and Yasmeen Veloz RD. She is interested in trying a GLP-1 for continued weight loss.  Semaglutide has been approved in the past but she was unable to find it at a pharmacy.  I will send in tirzepatide.  If insurance denies this medication we may try semaglutide.  She was provided education about GLP-1 injectables.  She knows that she would need to follow-up 4 weeks after starting the medication.    Return in 3 months for follow-up on ADHD but she will schedule sooner if she is able to start tirzepatide.    Return in about 3 months (around 1/2/2025). unless patient needs to be seen sooner or acute issues arise.    I have discussed the patient results/orders and and plan/recommendation with them at today's visit.      EZIO Ruiz   10/02/2024

## 2024-10-03 ENCOUNTER — TELEPHONE (OUTPATIENT)
Dept: INTERNAL MEDICINE | Facility: CLINIC | Age: 44
End: 2024-10-03

## 2024-10-03 DIAGNOSIS — E66.01 CLASS 3 SEVERE OBESITY WITH BODY MASS INDEX (BMI) OF 40.0 TO 44.9 IN ADULT, UNSPECIFIED OBESITY TYPE, UNSPECIFIED WHETHER SERIOUS COMORBIDITY PRESENT: Primary | ICD-10-CM

## 2024-10-03 DIAGNOSIS — E66.813 CLASS 3 SEVERE OBESITY WITH BODY MASS INDEX (BMI) OF 40.0 TO 44.9 IN ADULT, UNSPECIFIED OBESITY TYPE, UNSPECIFIED WHETHER SERIOUS COMORBIDITY PRESENT: Primary | ICD-10-CM

## 2024-10-03 DIAGNOSIS — I10 PRIMARY HYPERTENSION: ICD-10-CM

## 2024-10-03 DIAGNOSIS — Z98.84 STATUS POST GASTRIC BYPASS FOR OBESITY: ICD-10-CM

## 2024-10-03 NOTE — TELEPHONE ENCOUNTER
Pharmacy Name:  [facility]     Reference Number (if applicable):  [id number]    Pharmacy representative name: DAX     Pharmacy representative phone number: [phone]     What medication are you calling in regards to:  BD SYRINGE SURE  LOCK   What question does the pharmacy have: SHE STATES SHE WANTS TO VERIFY IF PATIENT  NEEDS THE NEEDLE WITH BD SYRINGE SURE LOCK    Who is the provider that prescribed the medication: CANDI BUCKNER     Additional notes: CALL BACK REQUEST

## 2024-10-04 ENCOUNTER — TELEPHONE (OUTPATIENT)
Dept: INTERNAL MEDICINE | Facility: CLINIC | Age: 44
End: 2024-10-04

## 2024-10-04 NOTE — TELEPHONE ENCOUNTER
Caller: [facility] - [place], [place] - [address] [phone] [facility] [phone] [facility]    Relationship: Pharmacy  SPOKE WITH JAMES    Jose call back number: [phone] REFERENCE NUMBER [id number]     What is the best time to reach you: ANYTIME    Who are you requesting to speak with (clinical staff, provider,  specific staff member): CLINICAL    What was the call regarding: SYRINGE 1ML VERIFY IF PATIENT NEEDS A NEEDLE WITH THIS SYRINGE

## 2024-10-09 DIAGNOSIS — I10 PRIMARY HYPERTENSION: ICD-10-CM

## 2024-10-09 DIAGNOSIS — E66.813 CLASS 3 SEVERE OBESITY WITH BODY MASS INDEX (BMI) OF 40.0 TO 44.9 IN ADULT, UNSPECIFIED OBESITY TYPE, UNSPECIFIED WHETHER SERIOUS COMORBIDITY PRESENT: Primary | ICD-10-CM

## 2024-10-09 DIAGNOSIS — E66.01 CLASS 3 SEVERE OBESITY WITH BODY MASS INDEX (BMI) OF 40.0 TO 44.9 IN ADULT, UNSPECIFIED OBESITY TYPE, UNSPECIFIED WHETHER SERIOUS COMORBIDITY PRESENT: Primary | ICD-10-CM

## 2024-10-16 ENCOUNTER — OFFICE VISIT (OUTPATIENT)
Dept: INTERNAL MEDICINE | Facility: CLINIC | Age: 44
End: 2024-10-16
Payer: COMMERCIAL

## 2024-10-16 VITALS
SYSTOLIC BLOOD PRESSURE: 124 MMHG | DIASTOLIC BLOOD PRESSURE: 78 MMHG | BODY MASS INDEX: 39.86 KG/M2 | TEMPERATURE: 98.1 F | WEIGHT: 248 LBS | HEART RATE: 85 BPM | HEIGHT: 66 IN | OXYGEN SATURATION: 98 %

## 2024-10-16 DIAGNOSIS — J01.10 ACUTE NON-RECURRENT FRONTAL SINUSITIS: Primary | ICD-10-CM

## 2024-10-16 PROCEDURE — 96372 THER/PROPH/DIAG INJ SC/IM: CPT

## 2024-10-16 PROCEDURE — 99213 OFFICE O/P EST LOW 20 MIN: CPT

## 2024-10-16 RX ORDER — TRIAMCINOLONE ACETONIDE 40 MG/ML
60 INJECTION, SUSPENSION INTRA-ARTICULAR; INTRAMUSCULAR ONCE
Status: COMPLETED | OUTPATIENT
Start: 2024-10-16 | End: 2024-10-16

## 2024-10-16 RX ADMIN — TRIAMCINOLONE ACETONIDE 60 MG: 40 INJECTION, SUSPENSION INTRA-ARTICULAR; INTRAMUSCULAR at 15:33

## 2024-10-16 NOTE — PROGRESS NOTES
Subjective     Chief Complaint:  Sinus pressure    HPI:  Patient presents today with complaints of sinus pressure. Please see assessment and plan below.    Past Medical History:   Past Medical History:   Diagnosis Date    ADHD (attention deficit hyperactivity disorder)     Allergic     Avocado; Anaphylaxis    Allergic rhinitis     Anxiety     Asthma     Cholelithiasis     Colon polyp     Depression     Disease of thyroid gland 2012    Hashimotos    Fibromyalgia, primary 2012    GERD (gastroesophageal reflux disease)     Glaucoma     Elevated Pressure    Headache     Hernia     Hypertension     Obesity     Personal history of COVID-19 2023    PONV (postoperative nausea and vomiting)     Sleep apnea     Does Not use CPAP     Past Surgical History:  Past Surgical History:   Procedure Laterality Date    ADENOIDECTOMY       SECTION      CHOLECYSTECTOMY      COLONOSCOPY      D & C WITH SUCTION      x2    ENDOMETRIAL ABLATION  2015    ENDOSCOPY N/A 2023    Procedure: ESOPHAGOGASTRODUODENOSCOPY WITH ANESTHESIA;  Surgeon: Binh Michele MD;  Location: [facility] ENDOSCOPY;  Service: General;  Laterality: N/A;  pre screen  post esophagitis       GASTRIC SLEEVE LAPAROSCOPIC N/A 2023    Procedure: GASTRIC SLEEVE LAPAROSCOPIC AND HIATAL HERNIA REPAIR WITH DAVINCI ROBOT;  Surgeon: Binh Michele MD;  Location: [facility] OR;  Service: Robotics - DaVinci;  Laterality: N/A;    SINUS SURGERY  2018    septoplasty with turbinates    TONSILLECTOMY      TUBAL ABDOMINAL LIGATION      TUBAL ABDOMINAL LIGATION      tubal reversal     VAGINAL HYSTERECTOMY      still has tubes and ovaries       Allergies:  Allergies   Allergen Reactions    Avocado Anaphylaxis    Prilosec [Omeprazole] Rash, Other (See Comments) and GI Intolerance     Achy joints    Cephalexin Provider Review Needed    Codeine Provider Review Needed    Promethazine Itching      -  PHENERGAN -      Medications:  Prior to Admission medications    Medication Sig Start Date End Date Taking? Authorizing Provider   amphetamine-dextroamphetamine (Adderall) 10 MG tablet Take 1 tablet by mouth 2 (Two) Times a Day. 10/2/24   ANGLE Raya DO   butalbital-acetaminophen-caffeine (FIORICET, ESGIC) -40 MG per tablet Take 1 tablet by mouth Every 6 (Six) Hours As Needed for Migraine. 7/2/24   Wendy Andrade APRN   Calcium Citrate-Vitamin D (CALCIUM CITRATE CHEWY BITE PO) Take 1 dose by mouth 3 (Three) Times a Day.    ProviderAníbal MD   clotrimazole-betamethasone (LOTRISONE) 1-0.05 % cream Apply 1 application  topically to the appropriate area as directed 2 (Two) Times a Day. 11/1/23   Wendy Andrade APRN   cyanocobalamin 1000 MCG/ML injection Inject 1 mL into the appropriate muscle as directed by prescriber Every 30 (Thirty) Days. VITAMIN B12 11/1/23   Wendy Andrade APRN   Estrogens Conjugated (Premarin) 0.625 MG/GM vaginal cream 0.5 g inserted vaginally, twice weekly. 3/21/24   Lucina Worrell APRN   montelukast (SINGULAIR) 10 MG tablet Take 1 tablet by mouth Every Night. 8/29/24   Wendy Andrade APRN   Multiple Vitamins-Minerals (BARIATRIC MULTIVITAMINS/IRON PO) Take 1 tablet by mouth Daily.    Aníbal Beltran MD   Probiotic Product (FloraVantage) capsule Take 1 each by mouth Daily. CHEWABLE    Aníbal Beltran MD   sertraline (ZOLOFT) 100 MG tablet Take 1.5 tablets by mouth Daily. 8/15/24   Ebony Carreno APRN   Syringe, Disposable, 1 ML misc Use 1 each 1 (One) Time Per Week. 10/2/24   Ebony Carreno APRN   Tirzepatide-Weight Management (ZEPBOUND) 2.5 MG/0.5ML solution auto-injector Inject 0.5 mL under the skin into the appropriate area as directed 1 (One) Time Per Week for 90 days. 10/9/24 1/7/25  Ebony Carreno APRN   vitamin D (ERGOCALCIFEROL) 1.25 MG (98369 UT) capsule capsule Take 1 capsule by mouth 1 (One) Time Per Week. 7/2/24    "Wendy Andrade LIZZIE, EZIO       Objective     Vital Signs: /78 (BP Location: Left arm, Patient Position: Sitting, Cuff Size: Large Adult)   Pulse 85   Temp 98.1 °F (36.7 °C) (Temporal)   Ht 167.6 cm (65.98\")   Wt 112 kg (248 lb)   SpO2 98%   BMI 40.05 kg/m²   Physical Exam  Vitals and nursing note reviewed.   Constitutional:       General: She is not in acute distress.     Appearance: Normal appearance.   HENT:      Right Ear: A middle ear effusion is present.      Left Ear: Tympanic membrane normal.      Nose: Congestion and rhinorrhea present.      Mouth/Throat:      Pharynx: Posterior oropharyngeal erythema present. No oropharyngeal exudate.   Cardiovascular:      Rate and Rhythm: Normal rate and regular rhythm.   Pulmonary:      Effort: Pulmonary effort is normal. No respiratory distress.      Breath sounds: Normal breath sounds. No wheezing.   Musculoskeletal:         General: Normal range of motion.   Skin:     General: Skin is warm and dry.      Capillary Refill: Capillary refill takes less than 2 seconds.      Findings: No bruising, erythema or rash.   Neurological:      Mental Status: She is alert and oriented to person, place, and time. Mental status is at baseline.      Motor: No weakness.       Results Reviewed:  No new results available for review.    Assessment / Plan     Assessment/Plan:  Diagnoses and all orders for this visit:    1. Acute non-recurrent frontal sinusitis (Primary)  -     amoxicillin-clavulanate (AUGMENTIN) 875-125 MG per tablet; Take 1 tablet by mouth 2 (Two) Times a Day for 7 days.  Dispense: 14 tablet; Refill: 0  -     triamcinolone acetonide (KENALOG-40) injection 60 mg       Patient presents today with complaints of sinus pressure.  Her symptoms started about 10 days ago.  She complains of runny nose, thick yellow drainage, sinus pressure, headache, and fatigue.  She feels that it is starting to move down into her chest.  She denies shortness of breath.  She denies " fever or chills.  She has been taking OTC medication which have not provided any relief.      COVID and flu swab not obtained today due to duration of symptoms.  Will treat for sinusitis with Augmentin.  She was on a steroid pack at the end of August so we will avoid prolonged steroids.  She did receive Kenalog 60 mg injection in the clinic today.  She will reach out if symptoms worsen or fail to improve.    Return for Next scheduled follow up. unless patient needs to be seen sooner or acute issues arise.    I have discussed the patient results/orders and and plan/recommendation with them at today's visit.      Ebony Carreno, APRN   10/16/2024

## 2024-10-28 DIAGNOSIS — F90.9 ADULT ADHD: ICD-10-CM

## 2024-10-28 RX ORDER — DEXTROAMPHETAMINE SACCHARATE, AMPHETAMINE ASPARTATE, DEXTROAMPHETAMINE SULFATE AND AMPHETAMINE SULFATE 2.5; 2.5; 2.5; 2.5 MG/1; MG/1; MG/1; MG/1
10 TABLET ORAL 2 TIMES DAILY
Qty: 60 TABLET | Refills: 0 | Status: SHIPPED | OUTPATIENT
Start: 2024-11-02

## 2024-10-29 ENCOUNTER — OFFICE VISIT (OUTPATIENT)
Dept: OBSTETRICS AND GYNECOLOGY | Age: 44
End: 2024-10-29
Payer: COMMERCIAL

## 2024-10-29 ENCOUNTER — LAB (OUTPATIENT)
Dept: LAB | Facility: HOSPITAL | Age: 44
End: 2024-10-29
Payer: COMMERCIAL

## 2024-10-29 VITALS
WEIGHT: 250 LBS | SYSTOLIC BLOOD PRESSURE: 108 MMHG | HEIGHT: 66 IN | BODY MASS INDEX: 40.18 KG/M2 | DIASTOLIC BLOOD PRESSURE: 70 MMHG

## 2024-10-29 DIAGNOSIS — Z13.79 GENETIC TESTING: ICD-10-CM

## 2024-10-29 DIAGNOSIS — N39.3 SUI (STRESS URINARY INCONTINENCE, FEMALE): ICD-10-CM

## 2024-10-29 DIAGNOSIS — Z01.419 ENCOUNTER FOR GYNECOLOGICAL EXAMINATION WITHOUT ABNORMAL FINDING: Primary | ICD-10-CM

## 2024-10-29 DIAGNOSIS — N95.2 ATROPHIC VAGINITIS: ICD-10-CM

## 2024-10-29 DIAGNOSIS — Z90.710 HISTORY OF HYSTERECTOMY: ICD-10-CM

## 2024-10-29 DIAGNOSIS — Z12.31 SCREENING MAMMOGRAM, ENCOUNTER FOR: ICD-10-CM

## 2024-10-29 PROCEDURE — 36415 COLL VENOUS BLD VENIPUNCTURE: CPT

## 2024-10-29 RX ORDER — CONJUGATED ESTROGENS 0.62 MG/G
CREAM VAGINAL
Qty: 90 G | Refills: 2 | Status: SHIPPED | OUTPATIENT
Start: 2024-10-29

## 2024-10-29 NOTE — PROGRESS NOTES
Chief Complaint  Annual Exam (PT is here for an annual exam/Hx of hysterectomy /Last mammogram 9/25/24 benign/Pt has not been as compliant with premarin cream so she is not sure if it has/would help with vaginal dryness. )  History of Present Illness  The patient is a 44-year-old female who presents for an annual exam.    She reports experiencing urinary incontinence, particularly when lifting her grandchildren. She does not experience any pelvic pain or pressure. She has undergone a hysterectomy, but her ovaries and tubes remain intact due to complications during the procedure.    She experiences hot flashes and night sweats, often waking up drenched in sweat. She has not had a heart attack, stroke, or blood clots in her legs or lungs. She has not been advised against hormone therapy. She uses Premarin vaginal cream, but not consistently. She has not yet started using Zepbound due to insurance issues.    Her primary care physician monitors her cholesterol and blood sugar levels, which are within normal ranges. She has had a colonoscopy and a recent mammogram.    She takes Zoloft daily, which she finds beneficial. She has undergone genetic testing and is awaiting the results. She does not wish to be tested for sexually transmitted diseases.    She reports no issues with constipation or diarrhea.    Subjective          Allison Mckeon presents to [facility]  History of Present Illness    Review of Systems   Constitutional:  Negative for activity change, appetite change, fatigue and fever.   HENT:  Negative for congestion, sore throat and trouble swallowing.    Eyes:  Negative for pain, discharge and visual disturbance.   Respiratory:  Negative for apnea, shortness of breath and wheezing.    Cardiovascular:  Negative for chest pain, palpitations and leg swelling.   Gastrointestinal:  Negative for abdominal pain, constipation and diarrhea.   Endocrine:        Hot flashes and night sweats    "  Genitourinary:  Negative for frequency, pelvic pain, urgency and vaginal discharge.        HELEN  Vaginal dryness   Musculoskeletal:  Negative for back pain and gait problem.   Skin:  Negative for color change and rash.   Neurological:  Negative for dizziness, weakness and numbness.   Psychiatric/Behavioral:  Negative for confusion and sleep disturbance.         Objective   Vital Signs:   /70   Ht 167.6 cm (66\")   Wt 113 kg (250 lb)   BMI 40.35 kg/m²     Physical Exam  Vitals and nursing note reviewed.   Constitutional:       Appearance: She is well-developed.   HENT:      Head: Normocephalic and atraumatic.      Right Ear: External ear normal.      Left Ear: External ear normal.   Eyes:      General: No scleral icterus.        Right eye: No discharge.         Left eye: No discharge.      Conjunctiva/sclera: Conjunctivae normal.   Neck:      Thyroid: No thyromegaly.      Vascular: No carotid bruit.   Cardiovascular:      Rate and Rhythm: Regular rhythm.      Heart sounds: Normal heart sounds. No murmur heard.  Pulmonary:      Effort: Pulmonary effort is normal. No respiratory distress.      Breath sounds: Normal breath sounds.   Chest:   Breasts:     Breasts are symmetrical.      Right: No inverted nipple, mass, nipple discharge, skin change or tenderness.      Left: No inverted nipple, mass, nipple discharge, skin change or tenderness.   Abdominal:      General: Bowel sounds are normal. There is no distension.      Palpations: Abdomen is soft. There is no mass.      Tenderness: There is no abdominal tenderness. There is no guarding.      Hernia: No hernia is present. There is no hernia in the left inguinal area.   Genitourinary:     Labia:         Right: No rash, tenderness, lesion or injury.         Left: No rash, tenderness, lesion or injury.       Vagina: Normal. No signs of injury. No vaginal discharge, erythema or bleeding.      Rectum: No mass.      Comments: Cervix, Uterus  are surgically " absent.  Urethra and urethral meatus normal  Bladder, normal no prolapse  Perineum and anus examined and without lesions  Musculoskeletal:         General: No tenderness. Normal range of motion.      Cervical back: Normal range of motion and neck supple.   Lymphadenopathy:      Head:      Right side of head: No submental, submandibular, tonsillar, preauricular, posterior auricular or occipital adenopathy.      Left side of head: No submental, submandibular, tonsillar, preauricular, posterior auricular or occipital adenopathy.      Cervical: No cervical adenopathy.      Right cervical: No superficial, deep or posterior cervical adenopathy.     Left cervical: No superficial, deep or posterior cervical adenopathy.   Skin:     General: Skin is warm and dry.      Findings: No bruising, erythema or rash.   Neurological:      Mental Status: She is alert and oriented to person, place, and time.      Motor: No abnormal muscle tone.      Coordination: Coordination normal.   Psychiatric:         Behavior: Behavior normal.         Thought Content: Thought content normal.         Judgment: Judgment normal.       Physical Exam    Result Review :   The following data was reviewed by: EZIO Sequeira on 10/29/2024:    Data reviewed : Radiologic studies mammogram    Results          Current Outpatient Medications on File Prior to Visit   Medication Sig    amphetamine-dextroamphetamine (Adderall) 10 MG tablet Take 1 tablet by mouth 2 (Two) Times a Day.    butalbital-acetaminophen-caffeine (FIORICET, ESGIC) -40 MG per tablet Take 1 tablet by mouth Every 6 (Six) Hours As Needed for Migraine.    Calcium Citrate-Vitamin D (CALCIUM CITRATE CHEWY BITE PO) Take 1 dose by mouth 3 (Three) Times a Day.    clotrimazole-betamethasone (LOTRISONE) 1-0.05 % cream Apply 1 application  topically to the appropriate area as directed 2 (Two) Times a Day.    cyanocobalamin 1000 MCG/ML injection Inject 1 mL into the appropriate muscle as  directed by prescriber Every 30 (Thirty) Days. VITAMIN B12    montelukast (SINGULAIR) 10 MG tablet Take 1 tablet by mouth Every Night.    Multiple Vitamins-Minerals (BARIATRIC MULTIVITAMINS/IRON PO) Take 1 tablet by mouth Daily.    Probiotic Product (FloraVantage) capsule Take 1 each by mouth Daily. CHEWABLE    sertraline (ZOLOFT) 100 MG tablet Take 1.5 tablets by mouth Daily.    vitamin D (ERGOCALCIFEROL) 1.25 MG (26670 UT) capsule capsule Take 1 capsule by mouth 1 (One) Time Per Week.    Syringe, Disposable, 1 ML misc Use 1 each 1 (One) Time Per Week. (Patient not taking: Reported on 10/29/2024)     No current facility-administered medications on file prior to visit.          Assessment and Plan      Well woman GYN exam.   Pap smear not indicated per ASCCP guidelines.   Pelvic exam with chaperone present.     Will have lab work at PCP.     Colonoscopy is up to date    Discussed STD prevention and testing.   Pt declines Chlamydia/Gonorrhea/Trichomonas, RPR, Hep panel and HIV testing.     Mammogram will [facility] at [facility].       Assessment & Plan  1. Urinary Incontinence.  She reports occasional urinary leakage, particularly when lifting, pulling, or sneezing. She was advised to perform Kegel exercises twice daily, with each set consisting of 10 repetitions lasting 10 seconds each. If symptoms persist after 3 months, pelvic floor physical therapy will be considered.    2. Vaginal Dryness.  She is currently using Premarin vaginal cream but is not fully compliant. She was advised to use the cream twice a week and to apply any remaining amount on the glove to the external tissue to prevent dryness and irritation. Additionally, she was advised to use a pea-sized amount of coconut oil on the off days to maintain moisture. A prescription for Premarin cream was provided.    3. Perimenopausal Symptoms.  She experiences night sweats and hot flashes. She was advised to wear cotton clothing and use cotton sheets. She was also  advised to keep a fan on her nightstand and to have a set of clean clothes nearby to change into if she wakes up sweating. Over-the-counter Zyrtec was recommended for daily use over a 2-week period to assess its effect on her symptoms. She was also advised to monitor her blood sugar, blood pressure, and thyroid levels.    4. Anxiety.  She reports increased anxiety, which may be related to perimenopausal symptoms. The possibility of switching from Zoloft to Effexor was discussed as Effexor may help with both anxiety and hot flashes. She was advised to consider this option and discuss it with her primary care provider if her anxiety becomes problematic.      Diagnoses and all orders for this visit:    1. Encounter for gynecological examination without abnormal finding (Primary)    2. Screening mammogram, encounter for  -     Mammo Screening Digital Tomosynthesis Bilateral With CAD; Future    3. HELEN (stress urinary incontinence, female)    4. Atrophic vaginitis    5. History of hysterectomy    Other orders  -     Estrogens Conjugated (Premarin) 0.625 MG/GM vaginal cream; 0.5 g inserted vaginally, twice weekly.  Dispense: 90 g; Refill: 2                  Follow Up   Return for Annual physical.    Patient was given instructions and counseling regarding her condition or for health maintenance advice. Please see specific information pulled into the AVS if appropriate.     Patient or patient representative verbalized consent for the use of Ambient Listening during the visit with  EZIO Sequeira for chart documentation. 11/10/2024  22:27 CST

## 2024-11-06 ENCOUNTER — PATIENT MESSAGE (OUTPATIENT)
Dept: INTERNAL MEDICINE | Facility: CLINIC | Age: 44
End: 2024-11-06
Payer: COMMERCIAL

## 2024-11-06 DIAGNOSIS — E66.01 CLASS 3 SEVERE OBESITY WITH BODY MASS INDEX (BMI) OF 40.0 TO 44.9 IN ADULT, UNSPECIFIED OBESITY TYPE, UNSPECIFIED WHETHER SERIOUS COMORBIDITY PRESENT: ICD-10-CM

## 2024-11-06 DIAGNOSIS — I10 PRIMARY HYPERTENSION: ICD-10-CM

## 2024-11-06 DIAGNOSIS — E66.813 CLASS 3 SEVERE OBESITY WITH BODY MASS INDEX (BMI) OF 40.0 TO 44.9 IN ADULT, UNSPECIFIED OBESITY TYPE, UNSPECIFIED WHETHER SERIOUS COMORBIDITY PRESENT: ICD-10-CM

## 2024-11-11 NOTE — PATIENT INSTRUCTIONS

## 2024-11-12 DIAGNOSIS — D51.0 PERNICIOUS ANEMIA: ICD-10-CM

## 2024-11-13 DIAGNOSIS — E66.813 CLASS 3 SEVERE OBESITY WITH BODY MASS INDEX (BMI) OF 40.0 TO 44.9 IN ADULT, UNSPECIFIED OBESITY TYPE, UNSPECIFIED WHETHER SERIOUS COMORBIDITY PRESENT: ICD-10-CM

## 2024-11-13 DIAGNOSIS — E66.01 CLASS 3 SEVERE OBESITY WITH BODY MASS INDEX (BMI) OF 40.0 TO 44.9 IN ADULT, UNSPECIFIED OBESITY TYPE, UNSPECIFIED WHETHER SERIOUS COMORBIDITY PRESENT: ICD-10-CM

## 2024-11-13 DIAGNOSIS — I10 PRIMARY HYPERTENSION: ICD-10-CM

## 2024-11-13 RX ORDER — CYANOCOBALAMIN 1000 UG/ML
1000 INJECTION, SOLUTION INTRAMUSCULAR; SUBCUTANEOUS
Qty: 3 ML | Refills: 2 | Status: SHIPPED | OUTPATIENT
Start: 2024-11-13

## 2024-11-14 LAB
ALLELIC STATE: NORMAL
AMBRY INTERPRETATION REPORT: NORMAL
AMINO ACID CHANGE: NORMAL
CHROMOSOME BLD/T: 5
DNA CHANGE: NORMAL
GEN ALLELE LOC ID: NORMAL
GENE DIS ANL INTERP-IMP: NORMAL
GENE DIS SEQ VAR INTERP-IMP: NORMAL
GENE STUDIED ID: NORMAL
GENETIC VAR ASSESS: PRESENT
GENOMIC SOURCE CLASS: NORMAL
HUMAN REF SEQ ASSEMBLY+BUILD: NORMAL
SIMPLE VAR ID: NORMAL
SIMPLE VAR ID: NORMAL
TRANSCRIPT REF SEQUENCE ID: NORMAL
VARIANT CATEGORY: NORMAL

## 2024-11-25 DIAGNOSIS — D51.0 PERNICIOUS ANEMIA: ICD-10-CM

## 2024-11-25 RX ORDER — CYANOCOBALAMIN 1000 UG/ML
1000 INJECTION, SOLUTION INTRAMUSCULAR; SUBCUTANEOUS
Qty: 3 ML | Refills: 2 | Status: SHIPPED | OUTPATIENT
Start: 2024-11-25

## 2024-12-02 ENCOUNTER — OFFICE VISIT (OUTPATIENT)
Age: 44
End: 2024-12-02
Payer: COMMERCIAL

## 2024-12-02 VITALS — BODY MASS INDEX: 40.5 KG/M2 | HEIGHT: 66 IN | WEIGHT: 252 LBS

## 2024-12-02 DIAGNOSIS — M25.552 LEFT HIP PAIN: ICD-10-CM

## 2024-12-02 DIAGNOSIS — M16.12 PRIMARY OSTEOARTHRITIS OF LEFT HIP: Primary | ICD-10-CM

## 2024-12-02 PROCEDURE — 99214 OFFICE O/P EST MOD 30 MIN: CPT | Performed by: PHYSICIAN ASSISTANT

## 2024-12-02 RX ORDER — BUTALBITAL, ACETAMINOPHEN AND CAFFEINE 50; 325; 40 MG/1; MG/1; MG/1
1 TABLET ORAL EVERY 6 HOURS PRN
COMMUNITY
Start: 2024-07-02

## 2024-12-02 RX ORDER — MONTELUKAST SODIUM 10 MG/1
10 TABLET ORAL NIGHTLY
COMMUNITY
Start: 2024-08-29

## 2024-12-02 RX ORDER — TIRZEPATIDE 2.5 MG/.5ML
INJECTION, SOLUTION SUBCUTANEOUS
COMMUNITY

## 2024-12-02 RX ORDER — DEXTROAMPHETAMINE SACCHARATE, AMPHETAMINE ASPARTATE, DEXTROAMPHETAMINE SULFATE AND AMPHETAMINE SULFATE 2.5; 2.5; 2.5; 2.5 MG/1; MG/1; MG/1; MG/1
1 TABLET ORAL 2 TIMES DAILY
COMMUNITY

## 2024-12-02 RX ORDER — CONJUGATED ESTROGENS 0.62 MG/G
CREAM VAGINAL
COMMUNITY
Start: 2024-10-29

## 2024-12-02 RX ORDER — CYANOCOBALAMIN 1000 UG/ML
1000 INJECTION, SOLUTION INTRAMUSCULAR; SUBCUTANEOUS
COMMUNITY
Start: 2024-11-25

## 2024-12-02 RX ORDER — ERGOCALCIFEROL 1.25 MG/1
50000 CAPSULE, LIQUID FILLED ORAL WEEKLY
COMMUNITY
Start: 2024-07-02

## 2024-12-02 ASSESSMENT — ENCOUNTER SYMPTOMS
BACK PAIN: 0
COLOR CHANGE: 0

## 2024-12-02 NOTE — ASSESSMENT & PLAN NOTE
AP pelvis and lateral hip x-rays were obtained today in office of the left hip.  These do demonstrate mild joint space narrowing as well as early osteophytosis of the left femoral acetabular joint space with no acute osseous abnormality appreciated.    We did discuss treatment options including injection, MRI, physical therapy.  She would like to try an injection intra-articularly.  We will see send her to pain management for fluoroscopy guided intra-articular injection and see her back after she has had this an injection to see how it helps her pain.

## 2024-12-02 NOTE — PROGRESS NOTES
Prescriptions    No medications on file        Return in about 6 weeks (around 1/13/2025) for Follow up without xray, left hip.          Discussed use, benefits, and side effects of any prescribed medications. All patient questions were answered. Patient voiced understanding of care plan.   Patient was given educational materials - see patient instructions below.         Electronically signed by JOSEFA Astudillo on 12/2/2024 at 2:23 PM

## 2024-12-05 DIAGNOSIS — E55.9 VITAMIN D INSUFFICIENCY: ICD-10-CM

## 2024-12-05 RX ORDER — ERGOCALCIFEROL 1.25 MG/1
50000 CAPSULE, LIQUID FILLED ORAL WEEKLY
Qty: 12 CAPSULE | Refills: 2 | Status: SHIPPED | OUTPATIENT
Start: 2024-12-05 | End: 2024-12-09 | Stop reason: SDUPTHER

## 2024-12-08 DIAGNOSIS — F90.9 ADULT ADHD: ICD-10-CM

## 2024-12-09 DIAGNOSIS — E55.9 VITAMIN D INSUFFICIENCY: ICD-10-CM

## 2024-12-09 DIAGNOSIS — J01.90 ACUTE NON-RECURRENT SINUSITIS, UNSPECIFIED LOCATION: ICD-10-CM

## 2024-12-09 DIAGNOSIS — Z86.69 HISTORY OF MIGRAINE: ICD-10-CM

## 2024-12-09 RX ORDER — CONJUGATED ESTROGENS 0.62 MG/G
CREAM VAGINAL
Qty: 90 G | Refills: 2 | Status: SHIPPED | OUTPATIENT
Start: 2024-12-09

## 2024-12-09 RX ORDER — ERGOCALCIFEROL 1.25 MG/1
50000 CAPSULE, LIQUID FILLED ORAL WEEKLY
Qty: 12 CAPSULE | Refills: 2 | Status: SHIPPED | OUTPATIENT
Start: 2024-12-09

## 2024-12-09 RX ORDER — BUTALBITAL, ACETAMINOPHEN AND CAFFEINE 50; 325; 40 MG/1; MG/1; MG/1
1 TABLET ORAL EVERY 6 HOURS PRN
Qty: 90 TABLET | Refills: 0 | Status: SHIPPED | OUTPATIENT
Start: 2024-12-09

## 2024-12-09 RX ORDER — MONTELUKAST SODIUM 10 MG/1
10 TABLET ORAL NIGHTLY
Qty: 90 TABLET | Refills: 2 | Status: SHIPPED | OUTPATIENT
Start: 2024-12-09

## 2024-12-09 RX ORDER — CLOTRIMAZOLE AND BETAMETHASONE DIPROPIONATE 10; .64 MG/G; MG/G
1 CREAM TOPICAL 2 TIMES DAILY
Qty: 90 G | Refills: 2 | Status: SHIPPED | OUTPATIENT
Start: 2024-12-09

## 2024-12-09 RX ORDER — DEXTROAMPHETAMINE SACCHARATE, AMPHETAMINE ASPARTATE, DEXTROAMPHETAMINE SULFATE AND AMPHETAMINE SULFATE 2.5; 2.5; 2.5; 2.5 MG/1; MG/1; MG/1; MG/1
10 TABLET ORAL 2 TIMES DAILY
Qty: 60 TABLET | Refills: 0 | Status: SHIPPED | OUTPATIENT
Start: 2024-12-09

## 2024-12-23 DIAGNOSIS — I10 PRIMARY HYPERTENSION: ICD-10-CM

## 2024-12-23 DIAGNOSIS — E66.813 CLASS 3 SEVERE OBESITY WITH BODY MASS INDEX (BMI) OF 40.0 TO 44.9 IN ADULT, UNSPECIFIED OBESITY TYPE, UNSPECIFIED WHETHER SERIOUS COMORBIDITY PRESENT: ICD-10-CM

## 2024-12-23 DIAGNOSIS — E66.01 CLASS 3 SEVERE OBESITY WITH BODY MASS INDEX (BMI) OF 40.0 TO 44.9 IN ADULT, UNSPECIFIED OBESITY TYPE, UNSPECIFIED WHETHER SERIOUS COMORBIDITY PRESENT: ICD-10-CM

## 2024-12-29 NOTE — H&P
Primary Physician: No primary care provider on file.    CHIEF COMPLAINT:left hip pain    HISTORY OF PRESENT ILLNESS:  Ms. Yeny Otero is 44 y.o. female with hx of elevated BMI (41), anxiety and depression, ADHD, migraines, is seen in consultation at the request of Lesia Moreland for evaluation of left hip pain and consideration for a left hip injection.  The available medical record is reviewed (along with outside notes when available), the patient is interviewed and examined with the plan formulated by myself.        Patient says she had the pain for over 6 months. She got an xray in July and was told she had bone spurs. Says the pain is severe and is affecting her quality of life. She would really like to get a steroid injection. She reports that she has had to wait months to get in to see the pain clinic.    Pain History:      Location: left  hip      Quality: aching, cramping      Severity: 7/10      Onset: > 3 months ago      Duration: waxing and waning      Relieving Factors: oral steroid, intramuscular steroid, helpful for a few days      Exacerbating Factors: prolonged weightbearing, walking, going from seated to standing or vice versa    Red Flags:      Progressive neurologic deficits: none      Unexplained Weight loss: none      Cancer history: none      Trauma: none      Fevers / chills:    Current Pain/Psych/Sleep Medications:  - adderall 10mg BID-Fioricet q 6 hours prn  -sertraline 100mg q daily    Prior PharmacotherapyName, dose, response  Acetaminophen +, NE  NSAIDS/steroidswas told to avoid due to hx of gastric sleeve  Muscle Relaxants-  Anti-neuropathic (feliberto, AEDs) None  Antidepressants (TCA, SNRI, SSRI)on sertraline  Opioidshas been on norco after surgery  Topicals (lido, diclofenac)none  Supplements (ALA, turmeric, LDN)  *NT = not tried, NE = not effective, AE = adverse effects        Opioid Risk Assessment:      Taking opioids? No      Providing Analgesia?      Improved Activity?

## 2024-12-30 ENCOUNTER — HOSPITAL ENCOUNTER (OUTPATIENT)
Dept: PAIN MANAGEMENT | Age: 44
Discharge: HOME | End: 2024-12-30
Payer: COMMERCIAL

## 2024-12-30 VITALS
OXYGEN SATURATION: 97 % | HEART RATE: 77 BPM | WEIGHT: 246 LBS | SYSTOLIC BLOOD PRESSURE: 122 MMHG | TEMPERATURE: 97.8 F | BODY MASS INDEX: 38.61 KG/M2 | RESPIRATION RATE: 16 BRPM | DIASTOLIC BLOOD PRESSURE: 83 MMHG | HEIGHT: 67 IN

## 2024-12-30 DIAGNOSIS — M16.12 PRIMARY OSTEOARTHRITIS OF LEFT HIP: Primary | ICD-10-CM

## 2024-12-30 PROCEDURE — 99203 OFFICE O/P NEW LOW 30 MIN: CPT | Performed by: STUDENT IN AN ORGANIZED HEALTH CARE EDUCATION/TRAINING PROGRAM

## 2024-12-30 PROCEDURE — 99215 OFFICE O/P EST HI 40 MIN: CPT

## 2024-12-30 RX ORDER — HYDROCODONE BITARTRATE AND ACETAMINOPHEN 5; 325 MG/1; MG/1
1 TABLET ORAL DAILY PRN
Qty: 15 TABLET | Refills: 0 | Status: SHIPPED | OUTPATIENT
Start: 2024-12-30 | End: 2025-01-29

## 2024-12-30 ASSESSMENT — PAIN DESCRIPTION - LOCATION: LOCATION: HIP

## 2024-12-30 ASSESSMENT — PAIN SCALES - GENERAL: PAINLEVEL_OUTOF10: 7

## 2024-12-30 ASSESSMENT — PAIN DESCRIPTION - PAIN TYPE: TYPE: CHRONIC PAIN

## 2024-12-30 ASSESSMENT — PAIN DESCRIPTION - ORIENTATION: ORIENTATION: LEFT

## 2024-12-30 NOTE — PROGRESS NOTES
Clinic Documentation      Education Provided:  [x] Review of [place]  [x] Agreement Review  [x] PEG Score Calculated [x] PHQ Score Calculated [x] ORT Score Calculated    [] Compliance Issues Discussed [] Cognitive Behavior Needs [x] Exercise [] Review of Test [] Financial Issues  [x] Tobacco/Alcohol Use Reviewed [x] Teaching [x] New Patient [] Picture Obtained    Physician Plan:  [] Outgoing Referral  [] Pharmacy Consult  [] Test Ordered [x] Prescription Ordered/Changed   [] Obtained Test Results / Consult Notes        Complete if patient is withholding blood thinner for procedure     Blood Thinner Patient is currently taking:      [] Plavix (Hold for 7 days)  [] Aspirin (Hold for 5 days)     [] Pletal (Hold for 2 days)  [] Pradaxa (Hold for 3 days)    [] Effient (Hold for 7 days)  [] Xarelto (Hold for 2 days)    [] Eliquis (Hold for 2 days)  [] Brilinta (Hold for 7 days)    [] Coumadin (Hold for 5 days) - (INR needs to be drawn the day prior to procedure- INR < 2.0)    [] Aggrenox (Hold for 7 days)        [] Patient will stop medication on their own.    [] Blood Thinner Form Faxed for approval to hold.   Provider form faxed to:     Assessment Completed by:  Electronically signed by Leticia Baeza MA on 12/30/2024 at 3:53 PM

## 2025-01-03 ENCOUNTER — OFFICE VISIT (OUTPATIENT)
Dept: INTERNAL MEDICINE | Facility: CLINIC | Age: 45
End: 2025-01-03
Payer: COMMERCIAL

## 2025-01-03 VITALS
WEIGHT: 242 LBS | HEIGHT: 66 IN | DIASTOLIC BLOOD PRESSURE: 70 MMHG | BODY MASS INDEX: 38.89 KG/M2 | HEART RATE: 92 BPM | TEMPERATURE: 97.4 F | SYSTOLIC BLOOD PRESSURE: 108 MMHG | OXYGEN SATURATION: 96 %

## 2025-01-03 DIAGNOSIS — E66.812 CLASS 2 SEVERE OBESITY DUE TO EXCESS CALORIES WITH SERIOUS COMORBIDITY AND BODY MASS INDEX (BMI) OF 39.0 TO 39.9 IN ADULT: ICD-10-CM

## 2025-01-03 DIAGNOSIS — F90.9 ADULT ADHD: ICD-10-CM

## 2025-01-03 DIAGNOSIS — E66.01 CLASS 2 SEVERE OBESITY DUE TO EXCESS CALORIES WITH SERIOUS COMORBIDITY AND BODY MASS INDEX (BMI) OF 39.0 TO 39.9 IN ADULT: ICD-10-CM

## 2025-01-03 DIAGNOSIS — Z23 NEED FOR INFLUENZA VACCINATION: ICD-10-CM

## 2025-01-03 DIAGNOSIS — F90.9 ADULT ADHD: Primary | ICD-10-CM

## 2025-01-03 DIAGNOSIS — M25.552 LEFT HIP PAIN: ICD-10-CM

## 2025-01-03 PROCEDURE — 90471 IMMUNIZATION ADMIN: CPT

## 2025-01-03 PROCEDURE — 96372 THER/PROPH/DIAG INJ SC/IM: CPT

## 2025-01-03 PROCEDURE — 90656 IIV3 VACC NO PRSV 0.5 ML IM: CPT

## 2025-01-03 PROCEDURE — 99214 OFFICE O/P EST MOD 30 MIN: CPT

## 2025-01-03 RX ORDER — TRIAMCINOLONE ACETONIDE 40 MG/ML
40 INJECTION, SUSPENSION INTRA-ARTICULAR; INTRAMUSCULAR ONCE
Status: COMPLETED | OUTPATIENT
Start: 2025-01-03 | End: 2025-01-03

## 2025-01-03 RX ORDER — HYDROCODONE BITARTRATE AND ACETAMINOPHEN 5; 325 MG/1; MG/1
1 TABLET ORAL DAILY
COMMUNITY
Start: 2024-12-30 | End: 2025-01-30

## 2025-01-03 RX ADMIN — TRIAMCINOLONE ACETONIDE 40 MG: 40 INJECTION, SUSPENSION INTRA-ARTICULAR; INTRAMUSCULAR at 16:35

## 2025-01-03 NOTE — PROGRESS NOTES
Subjective     Chief Complaint:  Follow-up ADHD, weight management    HPI:  Patient presents today for the above problems. Please see assessment and plan below.    Past Medical History:   Past Medical History:   Diagnosis Date    ADHD (attention deficit hyperactivity disorder)     Allergic     Avocado; Anaphylaxis    Allergic rhinitis     Anxiety     Asthma     Cholelithiasis     Colon polyp     Depression     Disease of thyroid gland     Hashimotos    Fibromyalgia, primary     GERD (gastroesophageal reflux disease)     Glaucoma     Elevated Pressure    Headache     Hernia     Hypertension     Obesity     Personal history of COVID-19 2023    PONV (postoperative nausea and vomiting)     Sleep apnea     Does Not use CPAP     Past Surgical History:  Past Surgical History:   Procedure Laterality Date    ADENOIDECTOMY       SECTION      CHOLECYSTECTOMY      COLONOSCOPY      D & C WITH SUCTION      x2    ENDOMETRIAL ABLATION      ENDOSCOPY N/A 2023    Procedure: ESOPHAGOGASTRODUODENOSCOPY WITH ANESTHESIA;  Surgeon: Binh Michele MD;  Location: [facility] ENDOSCOPY;  Service: General;  Laterality: N/A;  pre screen  post esophagitis       GASTRIC SLEEVE LAPAROSCOPIC N/A 2023    Procedure: GASTRIC SLEEVE LAPAROSCOPIC AND HIATAL HERNIA REPAIR WITH DAVINCI ROBOT;  Surgeon: Binh Michele MD;  Location: [facility] OR;  Service: Robotics - DaVinci;  Laterality: N/A;    SINUS SURGERY  2018    septoplasty with turbinates    TONSILLECTOMY      TUBAL ABDOMINAL LIGATION      TUBAL ABDOMINAL LIGATION      tubal reversal     VAGINAL HYSTERECTOMY      still has tubes and ovaries       Allergies:  Allergies   Allergen Reactions    Avocado Anaphylaxis    Prilosec [Omeprazole] Rash, Other (See Comments) and GI Intolerance     Achy joints    Cephalexin Provider Review Needed    Codeine Provider Review Needed    Promethazine Itching       - PHENERGAN -      Medications:  Prior to Admission medications    Medication Sig Start Date End Date Taking? Authorizing Provider   amphetamine-dextroamphetamine (Adderall) 10 MG tablet Take 1 tablet by mouth 2 (Two) Times a Day. 12/9/24   ANGLE Raya DO   butalbital-acetaminophen-caffeine (FIORICET, ESGIC) -40 MG per tablet Take 1 tablet by mouth Every 6 (Six) Hours As Needed for Migraine. 12/9/24   ANGLE Raya DO   Calcium Citrate-Vitamin D (CALCIUM CITRATE CHEWY BITE PO) Take 1 dose by mouth 3 (Three) Times a Day.    ProviderAníbal MD   clotrimazole-betamethasone (LOTRISONE) 1-0.05 % cream Apply 1 Application topically to the appropriate area as directed 2 (Two) Times a Day. 12/9/24   ANGLE Raya DO   cyanocobalamin 1000 MCG/ML injection Inject 1 mL into the appropriate muscle as directed by prescriber Every 30 (Thirty) Days. VITAMIN B12 11/25/24   Victoriano Wheeler MD   Estrogens Conjugated (Premarin) 0.625 MG/GM vaginal cream 0.5 g inserted vaginally, twice weekly. 12/9/24   ANGLE Raya DO   montelukast (SINGULAIR) 10 MG tablet Take 1 tablet by mouth Every Night. 12/9/24   ANGLE Raya DO   Multiple Vitamins-Minerals (BARIATRIC MULTIVITAMINS/IRON PO) Take 1 tablet by mouth Daily.    ProviderAníbal MD   Probiotic Product (FloraVantage) capsule Take 1 each by mouth Daily. CHEWABLE    ProviderAníbal MD   sertraline (ZOLOFT) 100 MG tablet Take 1.5 tablets by mouth Daily. 8/15/24   Ebony Carreno APRN   Syringe, Disposable, 1 ML misc Use 1 each 1 (One) Time Per Week.  Patient not taking: Reported on 10/29/2024 10/2/24   Ebony Carreno APRN   Tirzepatide-Weight Management (ZEPBOUND) 5 MG/0.5ML solution auto-injector Inject 0.5 mL under the skin into the appropriate area as directed 1 (One) Time Per Week. 12/23/24   Ebony Carreno APRN   vitamin D (ERGOCALCIFEROL) 1.25 MG (78949 UT) capsule capsule Take 1 capsule by mouth  "1 (One) Time Per Week. 12/9/24   ANGLE Raya DO       Objective     Vital Signs: /70 (BP Location: Left arm, Patient Position: Sitting, Cuff Size: Large Adult)   Pulse 92   Temp 97.4 °F (36.3 °C) (Temporal)   Ht 167.6 cm (65.98\")   Wt 110 kg (242 lb)   SpO2 96%   BMI 39.08 kg/m²   Physical Exam  Constitutional:       Appearance: Normal appearance. She is obese.   Cardiovascular:      Rate and Rhythm: Normal rate and regular rhythm.      Pulses: Normal pulses.      Heart sounds: No murmur heard.  Pulmonary:      Effort: Pulmonary effort is normal. No respiratory distress.      Breath sounds: Normal breath sounds. No wheezing.   Neurological:      Mental Status: She is alert and oriented to person, place, and time. Mental status is at baseline.      Motor: No weakness.       Results Reviewed:  No new results available for review.    Assessment / Plan     Assessment/Plan:  Diagnoses and all orders for this visit:    1. Adult ADHD (Primary)    2. Need for influenza vaccination  -     Fluzone >6mos    3. Class 2 severe obesity due to excess calories with serious comorbidity and body mass index (BMI) of 39.0 to 39.9 in adult    4. Left hip pain  -     triamcinolone acetonide (KENALOG-40) injection 40 mg       History of Present Illness  The patient is a 44-year-old female who presents today for follow-up on weight management and ADHD.    She has been on tirzepatide 5 mg, which she reports as effective in managing her weight. She has experienced a weight loss of 8 pounds since starting the medication. She expresses satisfaction with the current dosage and does not wish to increase it. The medication has significantly reduced her food cravings, particularly for sweet and salty foods. She continues to maintain a normal diet. Additionally, she notes an improvement in her executive function. She was initially hesitant to start the medication due to previous negative experiences with weight loss pills " but is pleased with the overall outcome.    She is currently on Adderall, which she takes twice daily.  She tolerates this well.  She denies side effects.    She has not yet received her hip injection.  She was referred to orthopedic [place] and they referred her to pain management.  She had an initial consult with pain management but they told her would take an additional 3 weeks to schedule her for an injection. She was offered Norco for pain management, which she accepted due to her inability to tolerate anti-inflammatories.     Assessment & Plan  1. Weight management.  She has experienced a weight loss of 8 pounds since starting tirzepatide 5 mg. The current plan is to maintain the dosage of tirzepatide at 5 mg, contingent upon insurance coverage.    2. Attention deficit hyperactivity disorder (ADHD).  She will continue her current regimen of Adderall. A prescription refill has been provided.    3. Hip pain.  Kenalog injection will be administered today to provide her pain relief until she can receive an injection at pain management.    Return in about 3 months (around 4/3/2025). unless patient needs to be seen sooner or acute issues arise.    Patient or patient representative verbalized consent for the use of Ambient Listening during the visit with  EZIO Ruiz for chart documentation. 1/3/2025  16:12 CST    I have discussed the patient results/orders and and plan/recommendation with them at today's visit.      EZIO Ruiz   01/03/2025

## 2025-01-04 RX ORDER — DEXTROAMPHETAMINE SACCHARATE, AMPHETAMINE ASPARTATE, DEXTROAMPHETAMINE SULFATE AND AMPHETAMINE SULFATE 2.5; 2.5; 2.5; 2.5 MG/1; MG/1; MG/1; MG/1
10 TABLET ORAL 2 TIMES DAILY
Qty: 60 TABLET | Refills: 0 | Status: SHIPPED | OUTPATIENT
Start: 2025-01-09

## 2025-01-06 DIAGNOSIS — U07.1 COVID-19 VIRUS INFECTION: Primary | ICD-10-CM

## 2025-01-29 DIAGNOSIS — Z98.84 STATUS POST GASTRIC BYPASS FOR OBESITY: ICD-10-CM

## 2025-01-29 DIAGNOSIS — D51.0 PERNICIOUS ANEMIA: ICD-10-CM

## 2025-01-29 DIAGNOSIS — Z00.00 WELLNESS EXAMINATION: Primary | ICD-10-CM

## 2025-02-03 ENCOUNTER — OFFICE VISIT (OUTPATIENT)
Dept: INTERNAL MEDICINE | Facility: CLINIC | Age: 45
End: 2025-02-03
Payer: COMMERCIAL

## 2025-02-03 VITALS
HEIGHT: 66 IN | OXYGEN SATURATION: 96 % | TEMPERATURE: 97.9 F | BODY MASS INDEX: 39.08 KG/M2 | HEART RATE: 89 BPM | DIASTOLIC BLOOD PRESSURE: 72 MMHG | SYSTOLIC BLOOD PRESSURE: 108 MMHG

## 2025-02-03 DIAGNOSIS — J20.8 ACUTE BACTERIAL BRONCHITIS: Primary | ICD-10-CM

## 2025-02-03 DIAGNOSIS — B37.0 ORAL YEAST INFECTION: ICD-10-CM

## 2025-02-03 DIAGNOSIS — B37.31 VAGINAL YEAST INFECTION: ICD-10-CM

## 2025-02-03 DIAGNOSIS — J45.20 MILD INTERMITTENT EXTRINSIC ASTHMA WITHOUT COMPLICATION: ICD-10-CM

## 2025-02-03 DIAGNOSIS — B96.89 ACUTE BACTERIAL BRONCHITIS: Primary | ICD-10-CM

## 2025-02-03 PROCEDURE — 96372 THER/PROPH/DIAG INJ SC/IM: CPT

## 2025-02-03 PROCEDURE — 99214 OFFICE O/P EST MOD 30 MIN: CPT

## 2025-02-03 RX ORDER — TRIAMCINOLONE ACETONIDE 40 MG/ML
40 INJECTION, SUSPENSION INTRA-ARTICULAR; INTRAMUSCULAR ONCE
Status: COMPLETED | OUTPATIENT
Start: 2025-02-03 | End: 2025-02-03

## 2025-02-03 RX ORDER — AZITHROMYCIN 250 MG/1
TABLET, FILM COATED ORAL
Qty: 6 TABLET | Refills: 0 | Status: SHIPPED | OUTPATIENT
Start: 2025-02-03

## 2025-02-03 RX ORDER — BENZONATATE 100 MG/1
100 CAPSULE ORAL 3 TIMES DAILY PRN
Qty: 45 CAPSULE | Refills: 0 | Status: SHIPPED | OUTPATIENT
Start: 2025-02-03

## 2025-02-03 RX ORDER — FLUCONAZOLE 150 MG/1
150 TABLET ORAL
Qty: 2 TABLET | Refills: 0 | Status: SHIPPED | OUTPATIENT
Start: 2025-02-03

## 2025-02-03 RX ORDER — ALBUTEROL SULFATE 90 UG/1
2 INHALANT RESPIRATORY (INHALATION) EVERY 4 HOURS PRN
Qty: 6.7 G | Refills: 1 | Status: SHIPPED | OUTPATIENT
Start: 2025-02-03

## 2025-02-03 RX ORDER — NYSTATIN 100000 [USP'U]/ML
500000 SUSPENSION ORAL 4 TIMES DAILY
Qty: 60 ML | Refills: 0 | Status: SHIPPED | OUTPATIENT
Start: 2025-02-03

## 2025-02-03 RX ORDER — GUAIFENESIN 600 MG/1
1200 TABLET, EXTENDED RELEASE ORAL 2 TIMES DAILY
Qty: 56 TABLET | Refills: 0 | Status: SHIPPED | OUTPATIENT
Start: 2025-02-03 | End: 2025-02-17

## 2025-02-03 RX ADMIN — TRIAMCINOLONE ACETONIDE 40 MG: 40 INJECTION, SUSPENSION INTRA-ARTICULAR; INTRAMUSCULAR at 16:08

## 2025-02-03 NOTE — PROGRESS NOTES
Subjective   Allison Mckeon is a 44 y.o. female.   Chief Complaint   Patient presents with    Cough     Onset 10 days ago congestion, mucus, low grade fever. Thinks it's bronchitis.          History of Present Illness  The patient is a 44-year-old female who presents to the office today with concerns of cough, congestion, thick mucus, and low-grade fever.    She has been grappling with a cold for approximately 2 weeks, which she suspects may have progressed into bronchitis. She reports a persistent cough, occasionally productive of thick phlegm, and experiences difficulty breathing when fatigued. Her cough disrupts her sleep, and she reports no gastrointestinal symptoms such as diarrhea or nausea. She has a history of bronchitis but has been symptom-free for some time. She reports that her children had colds last week, with one developing an ear infection subsequently. COVID-19 tests were negative for all family members. She has been self-medicating with Sudafed and DayQuil but continues to experience significant coughing. She has been taking multivitamins, weekly vitamin D, Zicam with extra vitamin C, and zinc. She has a history of seasonal or exercise-induced asthma but has not required albuterol recently. She has a history of yeast infections associated with antibiotic use and requires Diflucan 2 tablets for resolution. She has noticed a white coating on her tongue, which predates her current illness. She reports oral discomfort, which has improved with saltwater gargles. She has not been using Mucinex. She tolerates steroids well and prefers steroid injections.    She reports that her sore throat has resolved, but she has a sensation of fluid in her right ear, although it is not painful. She has been using cough medications containing codeine without any adverse effects and finds Tessalon Perles particularly effective during the day as they do not induce drowsiness. She has a history of seasonal or  exercise-induced asthma but has not required albuterol recently.    ALLERGIES  The patient is allergic to CEPHALEXIN and PHENERGAN.    MEDICATIONS  Current: multivitamin, weekly vitamin D, Zicam with extra vitamin C, zinc, Sudafed, DayQuil, Tessalon Perles, albuterol (as needed)       The following portions of the patient's history were reviewed and updated as appropriate: allergies, current medications, past family history, past medical history, past social history, past surgical history and problem list.    Review of Systems   Respiratory:  Positive for cough.        Objective   Past Medical History:   Diagnosis Date    ADHD (attention deficit hyperactivity disorder)     Allergic     Avocado; Anaphylaxis    Allergic rhinitis     Anxiety     Asthma     Cholelithiasis     Colon polyp     Depression     Disease of thyroid gland     Hashimotos    Fibromyalgia, primary     GERD (gastroesophageal reflux disease)     Glaucoma     Elevated Pressure    Headache     Hernia     Hypertension     Obesity     Personal history of COVID-19 2023    PONV (postoperative nausea and vomiting)     Sleep apnea     Does Not use CPAP      Past Surgical History:   Procedure Laterality Date    ADENOIDECTOMY       SECTION      CHOLECYSTECTOMY      COLONOSCOPY      D & C WITH SUCTION      x2    ENDOMETRIAL ABLATION  2015    ENDOSCOPY N/A 2023    Procedure: ESOPHAGOGASTRODUODENOSCOPY WITH ANESTHESIA;  Surgeon: Binh Michele MD;  Location: [facility] ENDOSCOPY;  Service: General;  Laterality: N/A;  pre screen  post esophagitis       GASTRIC SLEEVE LAPAROSCOPIC N/A 2023    Procedure: GASTRIC SLEEVE LAPAROSCOPIC AND HIATAL HERNIA REPAIR WITH [employer] ROBOT;  Surgeon: Binh Michele MD;  Location: [facility] OR;  Service: Robotics - DaVinci;  Laterality: N/A;    SINUS SURGERY  2018    septoplasty with turbinates    TONSILLECTOMY      TUBAL ABDOMINAL LIGATION       TUBAL ABDOMINAL LIGATION      tubal reversal 2010    VAGINAL HYSTERECTOMY      still has tubes and ovaries        Current Outpatient Medications:     amphetamine-dextroamphetamine (Adderall) 10 MG tablet, Take 1 tablet by mouth 2 (Two) Times a Day., Disp: 60 tablet, Rfl: 0    butalbital-acetaminophen-caffeine (FIORICET, ESGIC) -40 MG per tablet, Take 1 tablet by mouth Every 6 (Six) Hours As Needed for Migraine., Disp: 90 tablet, Rfl: 0    Calcium Citrate-Vitamin D (CALCIUM CITRATE CHEWY BITE PO), Take 1 dose by mouth 3 (Three) Times a Day., Disp: , Rfl:     clotrimazole-betamethasone (LOTRISONE) 1-0.05 % cream, Apply 1 Application topically to the appropriate area as directed 2 (Two) Times a Day., Disp: 90 g, Rfl: 2    cyanocobalamin 1000 MCG/ML injection, Inject 1 mL into the appropriate muscle as directed by prescriber Every 30 (Thirty) Days. VITAMIN B12, Disp: 3 mL, Rfl: 2    Estrogens Conjugated (Premarin) 0.625 MG/GM vaginal cream, 0.5 g inserted vaginally, twice weekly., Disp: 90 g, Rfl: 2    montelukast (SINGULAIR) 10 MG tablet, Take 1 tablet by mouth Every Night., Disp: 90 tablet, Rfl: 2    Multiple Vitamins-Minerals (BARIATRIC MULTIVITAMINS/IRON PO), Take 1 tablet by mouth Daily., Disp: , Rfl:     Probiotic Product (FloraVantage) capsule, Take 1 each by mouth Daily. CHEWABLE, Disp: , Rfl:     sertraline (ZOLOFT) 100 MG tablet, Take 1.5 tablets by mouth Daily., Disp: 135 tablet, Rfl: 2    Syringe, Disposable, 1 ML misc, Use 1 each 1 (One) Time Per Week., Disp: 25 each, Rfl: 1    Tirzepatide-Weight Management (ZEPBOUND) 5 MG/0.5ML solution auto-injector, Inject 0.5 mL under the skin into the appropriate area as directed 1 (One) Time Per Week., Disp: 2 mL, Rfl: 0    vitamin D (ERGOCALCIFEROL) 1.25 MG (10068 UT) capsule capsule, Take 1 capsule by mouth 1 (One) Time Per Week., Disp: 12 capsule, Rfl: 2    albuterol sulfate  (90 Base) MCG/ACT inhaler, Inhale 2 puffs Every 4 (Four) Hours As Needed  "for Wheezing., Disp: 6.7 g, Rfl: 1    amoxicillin-clavulanate (AUGMENTIN) 875-125 MG per tablet, Take 1 tablet by mouth 2 (Two) Times a Day for 7 days., Disp: 14 tablet, Rfl: 0    azithromycin (Zithromax Z-Bradford) 250 MG tablet, Take 2 tablets the first day, then 1 tablet daily for 4 days., Disp: 6 tablet, Rfl: 0    benzonatate (Tessalon Perles) 100 MG capsule, Take 1 capsule by mouth 3 (Three) Times a Day As Needed for Cough., Disp: 45 capsule, Rfl: 0    fluconazole (Diflucan) 150 MG tablet, Take 1 tablet by mouth Every 72 (Seventy-Two) Hours., Disp: 2 tablet, Rfl: 0    guaiFENesin (Mucinex) 600 MG 12 hr tablet, Take 2 tablets by mouth 2 (Two) Times a Day for 14 days., Disp: 56 tablet, Rfl: 0    nystatin (MYCOSTATIN) 100,000 unit/mL suspension, Swish and swallow 5 mL 4 (Four) Times a Day., Disp: 60 mL, Rfl: 0  No current facility-administered medications for this visit.      /72 (BP Location: Left arm, Patient Position: Sitting, Cuff Size: Large Adult)   Pulse 89   Temp 97.9 °F (36.6 °C) (Temporal)   Ht 167.6 cm (65.98\")   SpO2 96%   BMI 39.08 kg/m²      Body mass index is 39.08 kg/m².          Physical Exam  Vitals and nursing note reviewed.   Constitutional:       General: She is not in acute distress.     Appearance: She is obese. She is ill-appearing. She is not toxic-appearing or diaphoretic.   HENT:      Head: Normocephalic and atraumatic.      Right Ear: There is no impacted cerumen. Tympanic membrane is erythematous. Tympanic membrane is not bulging.      Left Ear: There is no impacted cerumen. Tympanic membrane is erythematous. Tympanic membrane is not bulging.      Nose: Congestion and rhinorrhea present.      Mouth/Throat:      Pharynx: Oropharyngeal exudate (on tongue) and posterior oropharyngeal erythema present.   Eyes:      Extraocular Movements: Extraocular movements intact.      Conjunctiva/sclera: Conjunctivae normal.      Pupils: Pupils are equal, round, and reactive to light. "   Cardiovascular:      Rate and Rhythm: Normal rate and regular rhythm.      Pulses: Normal pulses.      Heart sounds: Normal heart sounds.   Pulmonary:      Effort: Pulmonary effort is normal.      Breath sounds: Wheezing and rales present.   Musculoskeletal:         General: Normal range of motion.      Cervical back: Normal range of motion and neck supple.   Skin:     General: Skin is warm and dry.   Neurological:      General: No focal deficit present.      Mental Status: She is alert and oriented to person, place, and time. Mental status is at baseline.   Psychiatric:         Mood and Affect: Mood normal.         Behavior: Behavior normal.         Thought Content: Thought content normal.         Judgment: Judgment normal.               Assessment & Plan   Diagnoses and all orders for this visit:    1. Acute bacterial bronchitis (Primary)  -     amoxicillin-clavulanate (AUGMENTIN) 875-125 MG per tablet; Take 1 tablet by mouth 2 (Two) Times a Day for 7 days.  Dispense: 14 tablet; Refill: 0  -     azithromycin (Zithromax Z-Bradford) 250 MG tablet; Take 2 tablets the first day, then 1 tablet daily for 4 days.  Dispense: 6 tablet; Refill: 0  -     benzonatate (Tessalon Perles) 100 MG capsule; Take 1 capsule by mouth 3 (Three) Times a Day As Needed for Cough.  Dispense: 45 capsule; Refill: 0  -     guaiFENesin (Mucinex) 600 MG 12 hr tablet; Take 2 tablets by mouth 2 (Two) Times a Day for 14 days.  Dispense: 56 tablet; Refill: 0  -     triamcinolone acetonide (KENALOG-40) injection 40 mg    2. Mild intermittent extrinsic asthma without complication  -     albuterol sulfate  (90 Base) MCG/ACT inhaler; Inhale 2 puffs Every 4 (Four) Hours As Needed for Wheezing.  Dispense: 6.7 g; Refill: 1    3. Vaginal yeast infection  -     fluconazole (Diflucan) 150 MG tablet; Take 1 tablet by mouth Every 72 (Seventy-Two) Hours.  Dispense: 2 tablet; Refill: 0    4. Oral yeast infection  -     nystatin (MYCOSTATIN) 100,000 unit/mL  suspension; Swish and swallow 5 mL 4 (Four) Times a Day.  Dispense: 60 mL; Refill: 0                 Assessment & Plan  1. Bronchitis.  She presents with symptoms of cough, congestion, thick mucus, and low-grade fever, which have persisted for about 2 weeks. She has been using Sudafed and DayQuil but continues to experience significant coughing and fatigue. A prescription for Augmentin and azithromycin has been issued to address the bronchitis. She is advised to take Mucinex 1200 mg twice daily, along with adequate hydration. If she continues to use DayQuil, she should take Mucinex 600 mg twice daily. A refill for albuterol inhaler has been provided for use as needed. Tessalon Perles have been prescribed for cough management. A one-time dose of steroid injection will be administered today. She is encouraged to prioritize rest and consider using an air humidifier.    2. Oral thrush.  She reports a white tongue and some oral discomfort, which has improved with saltwater gargling. Nystatin swish has been prescribed for oral use. She is advised to swish and swallow the nystatin if her throat is sore to ensure coverage of the back of the throat.    3. Medication management.  She has a history of yeast infections with antibiotics. Diflucan has been prescribed for use as needed.    Follow-up  The patient will follow up in March 2025, or sooner if necessary.    Patient or patient representative verbalized consent for the use of Ambient Listening during the visit with  EZIO Felipe for chart documentation. 2/3/2025  16:51 CST    Please note that portions of this note were completed with a voice recognition program.     Electronically signed by EZIO Felipe, 02/03/25, 16:51 CST.

## 2025-02-28 DIAGNOSIS — I10 PRIMARY HYPERTENSION: ICD-10-CM

## 2025-02-28 DIAGNOSIS — E66.01 CLASS 3 SEVERE OBESITY WITH BODY MASS INDEX (BMI) OF 40.0 TO 44.9 IN ADULT, UNSPECIFIED OBESITY TYPE, UNSPECIFIED WHETHER SERIOUS COMORBIDITY PRESENT: ICD-10-CM

## 2025-02-28 DIAGNOSIS — E66.813 CLASS 3 SEVERE OBESITY WITH BODY MASS INDEX (BMI) OF 40.0 TO 44.9 IN ADULT, UNSPECIFIED OBESITY TYPE, UNSPECIFIED WHETHER SERIOUS COMORBIDITY PRESENT: ICD-10-CM

## 2025-02-28 DIAGNOSIS — F90.9 ADULT ADHD: ICD-10-CM

## 2025-02-28 RX ORDER — DEXTROAMPHETAMINE SACCHARATE, AMPHETAMINE ASPARTATE, DEXTROAMPHETAMINE SULFATE AND AMPHETAMINE SULFATE 2.5; 2.5; 2.5; 2.5 MG/1; MG/1; MG/1; MG/1
10 TABLET ORAL 2 TIMES DAILY
Qty: 60 TABLET | Refills: 0 | Status: SHIPPED | OUTPATIENT
Start: 2025-03-01

## 2025-03-07 DIAGNOSIS — L71.9 ROSACEA: Primary | ICD-10-CM

## 2025-03-07 RX ORDER — AZELAIC ACID 0.15 G/G
1 GEL TOPICAL 2 TIMES DAILY
Qty: 50 G | Refills: 0 | Status: SHIPPED | OUTPATIENT
Start: 2025-03-07

## 2025-03-12 DIAGNOSIS — B37.0 ORAL THRUSH: Primary | ICD-10-CM

## 2025-03-12 RX ORDER — FLUCONAZOLE 100 MG/1
TABLET ORAL
Qty: 9 TABLET | Refills: 0 | Status: SHIPPED | OUTPATIENT
Start: 2025-03-12

## 2025-03-18 ENCOUNTER — HOSPITAL ENCOUNTER (OUTPATIENT)
Dept: PAIN MANAGEMENT | Age: 45
Discharge: HOME | End: 2025-03-18
Payer: COMMERCIAL

## 2025-03-18 VITALS
SYSTOLIC BLOOD PRESSURE: 119 MMHG | TEMPERATURE: 96.7 F | DIASTOLIC BLOOD PRESSURE: 79 MMHG | RESPIRATION RATE: 16 BRPM | HEART RATE: 82 BPM | OXYGEN SATURATION: 96 %

## 2025-03-18 DIAGNOSIS — R52 PAIN MANAGEMENT: ICD-10-CM

## 2025-03-18 PROCEDURE — 77002 NEEDLE LOCALIZATION BY XRAY: CPT

## 2025-03-18 PROCEDURE — 6360000002 HC RX W HCPCS

## 2025-03-18 PROCEDURE — 20610 DRAIN/INJ JOINT/BURSA W/O US: CPT

## 2025-03-18 PROCEDURE — 2580000003 HC RX 258

## 2025-03-18 RX ORDER — TRIAMCINOLONE ACETONIDE 40 MG/ML
80 INJECTION, SUSPENSION INTRA-ARTICULAR; INTRAMUSCULAR ONCE
Status: DISCONTINUED | OUTPATIENT
Start: 2025-03-18 | End: 2025-03-20 | Stop reason: HOSPADM

## 2025-03-18 RX ORDER — LIDOCAINE HYDROCHLORIDE 10 MG/ML
6 INJECTION, SOLUTION EPIDURAL; INFILTRATION; INTRACAUDAL; PERINEURAL ONCE
Status: DISCONTINUED | OUTPATIENT
Start: 2025-03-18 | End: 2025-03-20 | Stop reason: HOSPADM

## 2025-03-18 RX ORDER — BUPIVACAINE HYDROCHLORIDE 5 MG/ML
1 INJECTION, SOLUTION EPIDURAL; INTRACAUDAL; PERINEURAL ONCE
Status: DISCONTINUED | OUTPATIENT
Start: 2025-03-18 | End: 2025-03-20 | Stop reason: HOSPADM

## 2025-03-18 RX ORDER — SODIUM CHLORIDE 9 MG/ML
3 INJECTION, SOLUTION INTRAMUSCULAR; INTRAVENOUS; SUBCUTANEOUS ONCE
Status: DISCONTINUED | OUTPATIENT
Start: 2025-03-18 | End: 2025-03-20 | Stop reason: HOSPADM

## 2025-03-18 ASSESSMENT — PAIN - FUNCTIONAL ASSESSMENT: PAIN_FUNCTIONAL_ASSESSMENT: NONE - DENIES PAIN

## 2025-03-18 NOTE — PROGRESS NOTES
Procedure:  Level of Consciousness: [x]Alert [x]Oriented []Disoriented []Lethargic  Anxiety Level: [x]Calm []Anxious []Depressed []Other  Skin: [x]Warm [x]Dry []Cool []Moist []Intact []Other  Cardiovascular: []Palpitations: [x]Never []Occasionally []Frequently  Chest Pain: [x]No []Yes  Respiratory:  [x]Unlabored []Labored []Cough ([] Productive []Unproductive)  HCG Required: []No []Yes   Results: []Negative []Positive  Knowledge Level:        [x]Patient/Other verbalized understanding of pre-procedure instructions.        [x]Assessment of post-op care needs (transportation, responsible caregiver)        [x]Able to discuss health care problems and how to deal with it.  Factors that Affect Teaching:        Language Barrier: [x]No []Yes - why:        Hearing Loss:        [x]No []Yes            Corrective Device:  []Yes []No        Vision Loss:           [x]No []Yes            Corrective Device:  []Yes []No        Memory Loss:       [x]No []Yes            []Short Term []Long Term  Motivational Level:  [x]Asks Questions                  []Extremely Anxious       [x]Seems Interested               []Seems Uninterested                  []Denies need for Education  Risk for Injury:  [x]Patient oriented to person, place and time  []History of frequent falls/loss of balance  Nutritional:  []Change in appetite   []Weight Gain   []Weight Loss  Functional:  []Requires assistance with ADL's

## 2025-03-18 NOTE — INTERVAL H&P NOTE
Update History & Physical    The patient's History and Physical  was reviewed with the patient and I examined the patient. There was no change. The surgical site was confirmed by the patient and me.     Plan: The risks, benefits, expected outcome, and alternative to the recommended procedure have been discussed with the patient. Patient understands and wants to proceed with the procedure.     Electronically signed by Lamonte Clemens MD on 3/18/2025 at 12:25 PM

## 2025-03-24 ENCOUNTER — TELEPHONE (OUTPATIENT)
Dept: PAIN MANAGEMENT | Age: 45
End: 2025-03-24

## 2025-03-24 NOTE — TELEPHONE ENCOUNTER
How much did the shot help?     85 %   What is your current pain level now?  2/10                        Has your activity level increased since the shot?  Yes.    Electronically signed by Ozzie King RN on 3/24/2025 at 10:33 AM

## 2025-04-04 ENCOUNTER — OFFICE VISIT (OUTPATIENT)
Dept: INTERNAL MEDICINE | Facility: CLINIC | Age: 45
End: 2025-04-04
Payer: COMMERCIAL

## 2025-04-04 VITALS
HEART RATE: 85 BPM | DIASTOLIC BLOOD PRESSURE: 76 MMHG | BODY MASS INDEX: 34.23 KG/M2 | WEIGHT: 213 LBS | SYSTOLIC BLOOD PRESSURE: 122 MMHG | HEIGHT: 66 IN | OXYGEN SATURATION: 98 % | TEMPERATURE: 97.2 F

## 2025-04-04 DIAGNOSIS — F90.9 ADULT ADHD: ICD-10-CM

## 2025-04-04 DIAGNOSIS — F32.A ANXIETY AND DEPRESSION: ICD-10-CM

## 2025-04-04 DIAGNOSIS — F41.9 ANXIETY AND DEPRESSION: ICD-10-CM

## 2025-04-04 DIAGNOSIS — L98.8 AGE-RELATED FACIAL WRINKLES: ICD-10-CM

## 2025-04-04 DIAGNOSIS — B37.0 ORAL THRUSH: ICD-10-CM

## 2025-04-04 DIAGNOSIS — Z00.00 WELLNESS EXAMINATION: Primary | ICD-10-CM

## 2025-04-04 DIAGNOSIS — B37.0 OROPHARYNGEAL CANDIDIASIS: ICD-10-CM

## 2025-04-04 RX ORDER — TRETINOIN 0.25 MG/G
1 CREAM TOPICAL NIGHTLY
Qty: 45 G | Refills: 0 | Status: SHIPPED | OUTPATIENT
Start: 2025-04-04

## 2025-04-04 RX ORDER — FLUCONAZOLE 150 MG/1
150 TABLET ORAL DAILY
Qty: 7 TABLET | Refills: 0 | Status: CANCELLED | OUTPATIENT
Start: 2025-04-04

## 2025-04-04 RX ORDER — TRETINOIN 0.25 MG/G
GEL TOPICAL NIGHTLY
Qty: 45 G | Refills: 0 | Status: CANCELLED | OUTPATIENT
Start: 2025-04-04

## 2025-04-04 RX ORDER — NYSTATIN 100000 [USP'U]/ML
500000 SUSPENSION ORAL 4 TIMES DAILY
Qty: 60 ML | Refills: 0 | Status: SHIPPED | OUTPATIENT
Start: 2025-04-04 | End: 2025-04-07 | Stop reason: SDUPTHER

## 2025-04-04 RX ORDER — FLUCONAZOLE 100 MG/1
TABLET ORAL
Qty: 11 TABLET | Refills: 0 | Status: SHIPPED | OUTPATIENT
Start: 2025-04-04

## 2025-04-04 RX ORDER — DEXTROAMPHETAMINE SACCHARATE, AMPHETAMINE ASPARTATE, DEXTROAMPHETAMINE SULFATE AND AMPHETAMINE SULFATE 2.5; 2.5; 2.5; 2.5 MG/1; MG/1; MG/1; MG/1
10 TABLET ORAL 2 TIMES DAILY
Qty: 60 TABLET | Refills: 0 | Status: SHIPPED | OUTPATIENT
Start: 2025-04-04

## 2025-04-04 RX ORDER — TRETINOIN 0.25 MG/G
CREAM TOPICAL
Qty: 90 G | OUTPATIENT
Start: 2025-04-04

## 2025-04-04 NOTE — PROGRESS NOTES
Subjective     Chief Complaint:  Wellness exam, follow up ADHD    HPI:  Patient presents today for the above problems. Please see assessment and plan below.    Past Medical History:   Past Medical History:   Diagnosis Date    ADHD (attention deficit hyperactivity disorder)     Allergic     Avocado; Anaphylaxis    Allergic rhinitis     Anxiety     Asthma     Cholelithiasis     Colon polyp     Depression     Disease of thyroid gland     Hashimotos    Fibromyalgia, primary     GERD (gastroesophageal reflux disease)     Glaucoma     Elevated Pressure    Headache     Hernia     Hypertension     Obesity     Personal history of COVID-19 2023    PONV (postoperative nausea and vomiting)     Sleep apnea     Does Not use CPAP     Past Surgical History:  Past Surgical History:   Procedure Laterality Date    ADENOIDECTOMY       SECTION      CHOLECYSTECTOMY      COLONOSCOPY      D & C WITH SUCTION      x2    ENDOMETRIAL ABLATION  2015    ENDOSCOPY N/A 2023    Procedure: ESOPHAGOGASTRODUODENOSCOPY WITH ANESTHESIA;  Surgeon: Binh Michele MD;  Location: [facility] ENDOSCOPY;  Service: General;  Laterality: N/A;  pre screen  post esophagitis       GASTRIC SLEEVE LAPAROSCOPIC N/A 2023    Procedure: GASTRIC SLEEVE LAPAROSCOPIC AND HIATAL HERNIA REPAIR WITH DAVINCI ROBOT;  Surgeon: Binh Michele MD;  Location: [facility] OR;  Service: Robotics - DaVinci;  Laterality: N/A;    SINUS SURGERY  2018    septoplasty with turbinates    TONSILLECTOMY      TUBAL ABDOMINAL LIGATION      TUBAL ABDOMINAL LIGATION      tubal reversal     VAGINAL HYSTERECTOMY      still has tubes and ovaries       Allergies:  Allergies   Allergen Reactions    Avocado Anaphylaxis    Prilosec [Omeprazole] Rash, Other (See Comments) and GI Intolerance     Achy joints    Cephalexin Provider Review Needed    Codeine Provider Review Needed    Promethazine Itching      -  PHENERGAN -      Medications:  Prior to Admission medications    Medication Sig Start Date End Date Taking? Authorizing Provider   albuterol sulfate  (90 Base) MCG/ACT inhaler Inhale 2 puffs Every 4 (Four) Hours As Needed for Wheezing. 2/3/25   Kassidy Corrigan, APRN   amphetamine-dextroamphetamine (Adderall) 10 MG tablet Take 1 tablet by mouth 2 (Two) Times a Day. 3/1/25   ANGLE Raya DO   azelaic acid (AZELEX) 15 % gel Apply 1 Application topically to the appropriate area as directed 2 (Two) Times a Day. 3/7/25   Ebony Carreno APRN   azithromycin (Zithromax Z-Bradford) 250 MG tablet Take 2 tablets the first day, then 1 tablet daily for 4 days. 2/3/25   Meenu [place] C, APRN   benzonatate (Tessalon Perles) 100 MG capsule Take 1 capsule by mouth 3 (Three) Times a Day As Needed for Cough. 2/3/25   Meenu, [place] C, APRN   butalbital-acetaminophen-caffeine (FIORICET, ESGIC) -40 MG per tablet Take 1 tablet by mouth Every 6 (Six) Hours As Needed for Migraine. 12/9/24   ANGLE Raya DO   Calcium Citrate-Vitamin D (CALCIUM CITRATE CHEWY BITE PO) Take 1 dose by mouth 3 (Three) Times a Day.    Provider, MD Aníbal   clotrimazole-betamethasone (LOTRISONE) 1-0.05 % cream Apply 1 Application topically to the appropriate area as directed 2 (Two) Times a Day. 12/9/24   ANGLE Raya DO   cyanocobalamin 1000 MCG/ML injection Inject 1 mL into the appropriate muscle as directed by prescriber Every 30 (Thirty) Days. VITAMIN B12 11/25/24   Victoriano Wheeler MD   Estrogens Conjugated (Premarin) 0.625 MG/GM vaginal cream 0.5 g inserted vaginally, twice weekly. 12/9/24   ANGLE Raya DO   fluconazole (Diflucan) 100 MG tablet Take 2 tablets on the first day, then take 1 tablet daily for 7 days. 3/12/25   Ebony Carreno APRN   montelukast (SINGULAIR) 10 MG tablet Take 1 tablet by mouth Every Night. 12/9/24   ANGLE Raya, DO   Multiple Vitamins-Minerals  "(BARIATRIC MULTIVITAMINS/IRON PO) Take 1 tablet by mouth Daily.    Provider, MD Aníbal   nystatin (MYCOSTATIN) 100,000 unit/mL suspension Swish and swallow 5 mL 4 (Four) Times a Day. 2/3/25   Kassidy Corrigan APRN   Probiotic Product (FloraVantage) capsule Take 1 each by mouth Daily. CHEWABLE    ProviderAníbal MD   sertraline (ZOLOFT) 100 MG tablet Take 1.5 tablets by mouth Daily. 8/15/24   Ebony Carreno APRN   Syringe, Disposable, 1 ML misc Use 1 each 1 (One) Time Per Week. 10/2/24   Ebony Carreno APRN   Tirzepatide-Weight Management (ZEPBOUND) 5 MG/0.5ML solution auto-injector Inject 0.5 mL under the skin into the appropriate area as directed 1 (One) Time Per Week. 2/28/25   Ebony Carreno APRN   vitamin D (ERGOCALCIFEROL) 1.25 MG (98246 UT) capsule capsule Take 1 capsule by mouth 1 (One) Time Per Week. 12/9/24   ANGLE Raya, DO       Objective     Vital Signs: /76 (BP Location: Left arm, Patient Position: Sitting, Cuff Size: Adult)   Pulse 85   Temp 97.2 °F (36.2 °C) (Temporal)   Ht 167.6 cm (65.98\")   Wt 96.6 kg (213 lb)   SpO2 98%   BMI 34.40 kg/m²   Physical Exam  Vitals and nursing note reviewed.   Constitutional:       General: She is not in acute distress.     Appearance: Normal appearance. She is obese.   HENT:      Right Ear: Tympanic membrane normal.      Left Ear: Tympanic membrane normal.      Mouth/Throat:      Pharynx: No posterior oropharyngeal erythema.   Cardiovascular:      Rate and Rhythm: Normal rate and regular rhythm.      Pulses: Normal pulses.      Heart sounds: Normal heart sounds. No murmur heard.  Pulmonary:      Effort: Pulmonary effort is normal. No respiratory distress.      Breath sounds: Normal breath sounds. No wheezing.   Abdominal:      General: Bowel sounds are normal. There is no distension.      Tenderness: There is no abdominal tenderness.   Musculoskeletal:         General: Normal range of motion.      Cervical back: Normal " range of motion.   Lymphadenopathy:      Cervical: No cervical adenopathy.   Skin:     General: Skin is warm and dry.      Coloration: Skin is not jaundiced.      Findings: No bruising or erythema.   Neurological:      Mental Status: She is alert and oriented to person, place, and time. Mental status is at baseline.      Motor: No weakness.       Results Reviewed:  Reviewed labs obtained on 1/30/2025 including CBC, CMP, lipid panel, TSH, and hormone labs.    Assessment / Plan     Assessment/Plan:  Diagnoses and all orders for this visit:    1. Wellness examination (Primary)    2. Oropharyngeal candidiasis  -     nystatin (MYCOSTATIN) 100,000 unit/mL suspension; Swish and swallow 5 mL 4 (Four) Times a Day.  Dispense: 60 mL; Refill: 0    3. Age-related facial wrinkles  -     tretinoin (RETIN-A) 0.025 % cream; Apply 1 Application topically to the appropriate area as directed Every Night.  Dispense: 45 g; Refill: 0    4. Adult ADHD    5. Anxiety and depression    6. Oral thrush  -     fluconazole (Diflucan) 100 MG tablet; Take 2 tablets on the first day, then take 1 tablet daily for 9 days.  Dispense: 11 tablet; Refill: 0       History of Present Illness  The patient is a 44-year-old female who presents today for a wellness exam and follow-up on ADHD.    She reports a recurrence of oral thrush, which initially responded to an oral rinse but recurred two days after discontinuation. A subsequent course of Diflucan also provided temporary relief before the condition resurfaced. She has been maintaining oral hygiene by using a tongue scraper in the morning, brushing her teeth 3 to 4 times daily, and replacing her toothbrush every two days. She also reports the onset of throat discomfort.     She has been using tretinoin cream, procured online, and is seeking a prescription for the same.    Assessment & Plan  1. Attention Deficit Hyperactivity Disorder (ADHD).  Adderall has been pended to appropriate provider.  Will obtain  UDS at next visit.  CSA on file.    2. Oropharyngeal Candidiasis.  A prescription for Diflucan has been issued, with instructions to take two tablets on the first day, followed by one tablet daily for the subsequent nine days. Additionally, a refill of Nystatin swish and swallow has been provided.    3. Age related facial wrinkles.  A prescription for tretinoin cream has been issued.    4. Wellness examination.  Preventative counseling provided. Body mass index is 34.4 kg/m².  Recommend maintaining healthy diet and being physically active.  Last mammogram was completed in October. Colonoscopy is up to date per patient, last completed 3 years ago. Pap smear was completed last year.  Immunizations reviewed.  Recommend annual dental and vision screening.    Return in about 3 months (around 7/4/2025). unless patient needs to be seen sooner or acute issues arise.    Patient or patient representative verbalized consent for the use of Ambient Listening during the visit with  EZIO Ruiz for chart documentation. 4/4/2025  14:01 CDT    I have discussed the patient results/orders and and plan/recommendation with them at today's visit.      EZIO Ruiz   04/04/2025

## 2025-04-07 DIAGNOSIS — B37.0 OROPHARYNGEAL CANDIDIASIS: ICD-10-CM

## 2025-04-07 RX ORDER — NYSTATIN 100000 [USP'U]/ML
500000 SUSPENSION ORAL 4 TIMES DAILY
Qty: 200 ML | Refills: 0 | Status: SHIPPED | OUTPATIENT
Start: 2025-04-07

## 2025-04-14 ENCOUNTER — CLINICAL SUPPORT (OUTPATIENT)
Dept: INTERNAL MEDICINE | Facility: CLINIC | Age: 45
End: 2025-04-14
Payer: COMMERCIAL

## 2025-04-14 DIAGNOSIS — J02.9 SORE THROAT: ICD-10-CM

## 2025-04-14 DIAGNOSIS — B37.0 ORAL CANDIDA: Primary | ICD-10-CM

## 2025-04-14 DIAGNOSIS — K13.79 ORAL PAIN: ICD-10-CM

## 2025-04-14 DIAGNOSIS — B37.0 ORAL CANDIDA: ICD-10-CM

## 2025-04-14 NOTE — PROGRESS NOTES
Patient presented to the office for a throat culture swab - this has been completed.   EZIO Roche has placed the order.

## 2025-04-16 DIAGNOSIS — B37.0 OROPHARYNGEAL CANDIDIASIS: ICD-10-CM

## 2025-04-16 RX ORDER — NYSTATIN 100000 [USP'U]/ML
500000 SUSPENSION ORAL 4 TIMES DAILY
Qty: 200 ML | Refills: 0 | Status: SHIPPED | OUTPATIENT
Start: 2025-04-16

## 2025-04-17 LAB
BACTERIA SPEC RESP CULT: NORMAL
BACTERIA SPEC RESP CULT: NORMAL

## 2025-04-24 ENCOUNTER — OFFICE VISIT (OUTPATIENT)
Dept: PAIN MANAGEMENT | Age: 45
End: 2025-04-24
Payer: COMMERCIAL

## 2025-04-24 VITALS
WEIGHT: 211.8 LBS | HEART RATE: 85 BPM | SYSTOLIC BLOOD PRESSURE: 118 MMHG | BODY MASS INDEX: 34.04 KG/M2 | RESPIRATION RATE: 16 BRPM | HEIGHT: 66 IN | TEMPERATURE: 96.7 F | OXYGEN SATURATION: 99 % | DIASTOLIC BLOOD PRESSURE: 83 MMHG

## 2025-04-24 DIAGNOSIS — M25.561 CHRONIC PAIN OF RIGHT KNEE: ICD-10-CM

## 2025-04-24 DIAGNOSIS — M25.551 RIGHT HIP PAIN: Primary | ICD-10-CM

## 2025-04-24 DIAGNOSIS — M16.12 PRIMARY OSTEOARTHRITIS OF LEFT HIP: ICD-10-CM

## 2025-04-24 DIAGNOSIS — G89.29 CHRONIC PAIN OF RIGHT KNEE: ICD-10-CM

## 2025-04-24 PROCEDURE — 99213 OFFICE O/P EST LOW 20 MIN: CPT | Performed by: STUDENT IN AN ORGANIZED HEALTH CARE EDUCATION/TRAINING PROGRAM

## 2025-04-24 NOTE — PROGRESS NOTES
Primary Physician: Karin Mccoy APRN - NP    CHIEF COMPLAINT:left hip pain    HISTORY OF PRESENT ILLNESS:  Ms. Yeny Otero is 44 y.o. female with hx of elevated BMI (41), anxiety and depression, ADHD, migraines, is seen in consultation at the request of Lesia Moreland for evaluation of left hip pain and consideration for a left hip injection.      Patient presents for follow up after receiving a left hip intra-articular injection with Dr. Clemens on 3/18/25. Patient reports 100% relief of the left hip that is ongoing. She is now having more right hip pain, but reports that she thinks it may be because she didn't realize her right hip was hurting due to the left hip pain being severe. At her last office visit, I gave her a short term norco 5mg script for 1/2 tablet prn daily due to increased wait for injections. She says she is no longer taking this and is not interested in continuing it.    Patient is pleased with the results of her last hip injection    Pain History:      Location: bilateral hips right>left      Quality: aching, cramping      Severity: 0/10 on the left and 4/10 on the right hip      Onset: > 3 months ago      Duration: waxing and waning      Relieving Factors: oral steroid, intramuscular steroid, helpful for a few days      Exacerbating Factors: prolonged weightbearing, walking, going from seated to standing or vice versa    Red Flags:      Progressive neurologic deficits: none      Unexplained Weight loss: none      Cancer history: none      Trauma: none      Fevers / chills:    Current Pain/Psych/Sleep Medications:  - adderall 10mg BID-Fioricet q 6 hours prn  -sertraline 100mg q daily  -norco (not taking)    Prior PharmacotherapyName, dose, response  Acetaminophen +, NE  NSAIDS/steroidswas told to avoid due to hx of gastric sleeve  Muscle Relaxants-  Anti-neuropathic (feliberto, AEDs) None  Antidepressants (TCA, SNRI, SSRI)on sertraline  Opioidshas been on norco after surgery  Topicals

## 2025-04-25 ENCOUNTER — LAB (OUTPATIENT)
Dept: LAB | Facility: HOSPITAL | Age: 45
End: 2025-04-25
Payer: COMMERCIAL

## 2025-04-25 DIAGNOSIS — B37.0 THRUSH: ICD-10-CM

## 2025-04-25 PROCEDURE — 87070 CULTURE OTHR SPECIMN AEROBIC: CPT

## 2025-04-25 PROCEDURE — 87205 SMEAR GRAM STAIN: CPT

## 2025-04-27 LAB
BACTERIA SPEC AEROBE CULT: NORMAL
GRAM STN SPEC: NORMAL

## 2025-04-28 ENCOUNTER — OFFICE VISIT (OUTPATIENT)
Dept: FAMILY MEDICINE CLINIC | Facility: CLINIC | Age: 45
End: 2025-04-28
Payer: COMMERCIAL

## 2025-04-28 VITALS
HEART RATE: 102 BPM | RESPIRATION RATE: 18 BRPM | HEIGHT: 67 IN | DIASTOLIC BLOOD PRESSURE: 79 MMHG | BODY MASS INDEX: 32.55 KG/M2 | SYSTOLIC BLOOD PRESSURE: 110 MMHG | OXYGEN SATURATION: 98 % | WEIGHT: 207.4 LBS

## 2025-04-28 DIAGNOSIS — E66.09 CLASS 1 OBESITY DUE TO EXCESS CALORIES WITH SERIOUS COMORBIDITY AND BODY MASS INDEX (BMI) OF 32.0 TO 32.9 IN ADULT: ICD-10-CM

## 2025-04-28 DIAGNOSIS — Z00.00 ENCOUNTER FOR MEDICAL EXAMINATION TO ESTABLISH CARE: ICD-10-CM

## 2025-04-28 DIAGNOSIS — L71.9 ROSACEA: ICD-10-CM

## 2025-04-28 DIAGNOSIS — L98.8 AGE-RELATED FACIAL WRINKLES: ICD-10-CM

## 2025-04-28 DIAGNOSIS — B37.0 THRUSH: ICD-10-CM

## 2025-04-28 DIAGNOSIS — B37.0 CANDIDA, ORAL: Primary | ICD-10-CM

## 2025-04-28 DIAGNOSIS — E66.811 CLASS 1 OBESITY DUE TO EXCESS CALORIES WITH SERIOUS COMORBIDITY AND BODY MASS INDEX (BMI) OF 32.0 TO 32.9 IN ADULT: ICD-10-CM

## 2025-04-28 RX ORDER — AZELAIC ACID 0.15 G/G
1 GEL TOPICAL 2 TIMES DAILY
Qty: 50 G | Refills: 2 | Status: SHIPPED | OUTPATIENT
Start: 2025-04-28

## 2025-04-28 RX ORDER — FLUCONAZOLE 200 MG/1
200 TABLET ORAL DAILY
Qty: 14 TABLET | Refills: 0 | Status: SHIPPED | OUTPATIENT
Start: 2025-04-28 | End: 2025-05-12

## 2025-04-28 RX ORDER — TRETINOIN 0.25 MG/G
1 CREAM TOPICAL NIGHTLY
Qty: 45 G | Refills: 2 | Status: SHIPPED | OUTPATIENT
Start: 2025-04-28

## 2025-04-28 NOTE — PROGRESS NOTES
Chief Complaint  Establish Care    Subjective    History of Present Illness      Patient presents to [facility] for   History of Present Illness  Pt presents to establish care with us.       History of Present Illness  The patient presents for evaluation of persistent oral thrush, hormone imbalance, and medication management.    She has been experiencing persistent oral thrush, initially thought to be geographic tongue. The condition improves with treatment but recurs after a few days. There is no itching, but an altered sense of taste is reported. She has been using a tongue scraper and has started oral probiotics every two days, increased brushing frequency to three times daily, and cleans her toothbrush with hydrogen peroxide. She is considering gastrointestinal mapping if current treatments fail. The thrush began after an illness and antibiotic treatment before fathers surgery on 02/01/2025, and despite completing the antibiotics, the thrush persisted.    Hormone replacement therapy is being sought due to abnormal hormone levels in recent blood work. Fatigue and difficulty managing stress have been noted, initially attributed to life circumstances. An estradiol E2 level of 11.6 was recorded. She is scheduled for abdominal skin removal surgery on 05/28/2025 in [place] and has been advised to discontinue estrogen two weeks prior but can continue with testosterone and progesterone cream. Mild hot flashes are reported but are not particularly bothersome. Fatigue and cognitive function are primary concerns. Thyroid testing returned normal results.    Adderall has been prescribed to help maintain focus and manage anxiety. Various medications have been used since her teenage years. Previously on Vyvanse 30 mg, which was effective until the dosage increase caused severe cramps, she suspects Vyvanse may have caused a drop in potassium levels, as she was hospitalized for low potassium during  "a previous trial. Zepbound, taken once a week, has significantly improved her executive function.    PAST SURGICAL HISTORY:  - Gastric sleeve surgery  -     Review of Systems   All other systems reviewed and are negative.      I have reviewed and agree with the HPI and ROS information as above.  EZIO Krueger     Objective   Vital Signs:   /79   Pulse 102   Resp 18   Ht 170.2 cm (67\")   Wt 94.1 kg (207 lb 6.4 oz)   SpO2 98%   BMI 32.48 kg/m²            Physical Exam  HENT:      Mouth/Throat:      Comments:  See attached media        LUDMILA-7:      PHQ-2 Depression Screening    Little interest or pleasure in doing things?     Feeling down, depressed, or hopeless?     PHQ-2 Total Score        PHQ-9 Depression Screening  Little interest or pleasure in doing things?     Feeling down, depressed, or hopeless?     PHQ-2 Total Score     Trouble falling or staying asleep, or sleeping too much?     Feeling tired or having little energy?     Poor appetite or overeating?     Feeling bad about yourself - or that you are a failure or have let yourself or your family down?     Trouble concentrating on things, such as reading the newspaper or watching television?     Moving or speaking so slowly that other people could have noticed? Or the opposite - being so fidgety or restless that you have been moving around a lot more than usual?     Thoughts that you would be better off dead, or of hurting yourself in some way?     PHQ-9 Total Score     If you checked off any problems, how difficult have these problems made it for you to do your work, take care of things at home, or get along with other people?             Result Review  Data Reviewed:                   Assessment and Plan      Diagnoses and all orders for this visit:    1. Candida, oral (Primary)  -     fluconazole (Diflucan) 200 MG tablet; Take 1 tablet by mouth Daily for 14 days.  Dispense: 14 tablet; Refill: 0    2. Thrush  -     Throat / Upper Respiratory " Culture - Swab, Throat; Future  -     fluconazole (Diflucan) 200 MG tablet; Take 1 tablet by mouth Daily for 14 days.  Dispense: 14 tablet; Refill: 0    3. Class 1 obesity due to excess calories with serious comorbidity and body mass index (BMI) of 32.0 to 32.9 in adult    4. Rosacea  -     azelaic acid (AZELEX) 15 % gel; Apply 1 Application topically to the appropriate area as directed 2 (Two) Times a Day.  Dispense: 50 g; Refill: 2    5. Age-related facial wrinkles  -     tretinoin (RETIN-A) 0.025 % cream; Apply 1 Application topically to the appropriate area as directed Every Night.  Dispense: 45 g; Refill: 2    6. Encounter for medical examination to establish care        Assessment & Plan  1. Oral thrush.  - The condition appears to be chronic Candida.  - A prescription for Diflucan 200 mg for 14 days has been provided.  - She is advised to discontinue rinsing and monitor her response to the medication.  - If there is no improvement, the dosage will be increased to 400 mg for an additional 7 days.  -She has had multiple treatments with Diflucan and Nystatin swish and spit previously by other PCP.   -Throat culture negative today in office     2. Hormonal imbalance.  - Her estradiol E2 level is 11.6, which is low and may be contributing to her fatigue.  - Thyroid function is within normal limits.  - It is recommended to postpone hormone replacement therapy until after her upcoming surgery on 05/28/2025.  - Post-surgery, repeat labs for testosterone, progesterone, and estradiol will be conducted to assess her body's response post op     3. Medication management.  - She has been on various medications since her teenage years.  - She was previously on Vyvanse 30 mg, which was effective until the dosage was increased, leading to severe cramps.  - She suspects that Vyvanse may have caused a drop in her potassium levels, as she was hospitalized for low potassium during a previous trial of the medication.  - She has  been taking Zepbound once a week, which has significantly improved her executive function.    4. Cont Retin-A and Azelaic acid for skin routine treatment.           Follow Up   Return if symptoms worsen or fail to improve.  Patient was given instructions and counseling regarding her condition or for health maintenance advice. Please see specific information pulled into the AVS if appropriate.     Patient or patient representative verbalized consent for the use of Ambient Listening during the visit with  EZIO Krueger for chart documentation. 4/28/2025  13:15 CDT

## 2025-05-02 ENCOUNTER — PATIENT ROUNDING (BHMG ONLY) (OUTPATIENT)
Dept: FAMILY MEDICINE CLINIC | Facility: CLINIC | Age: 45
End: 2025-05-02
Payer: COMMERCIAL

## 2025-05-02 NOTE — PROGRESS NOTES
May 2, 2025    Hello, may I speak with Allison Mckeon?    My name is Katarzyna      I am  with [facility]  [facility]  [address]  [place] [address] [phone].    Before we get started may I verify your date of birth? 1980    I am calling to officially welcome you to our practice and ask about your recent visit. Is this a good time to talk? yes    Tell me about your visit with us. What things went well?  Oh it was FABULOUS! Marta is WONDERFUL!       We're always looking for ways to make our patients' experiences even better. Do you have recommendations on ways we may improve?  no    Overall were you satisfied with your first visit to our practice? yes       I appreciate you taking the time to speak with me today. Is there anything else I can do for you? no      Thank you, and have a great day.

## 2025-05-07 DIAGNOSIS — B37.0 CANDIDA, ORAL: ICD-10-CM

## 2025-05-07 DIAGNOSIS — B37.0 THRUSH: ICD-10-CM

## 2025-05-07 RX ORDER — FLUCONAZOLE 200 MG/1
200 TABLET ORAL DAILY
Qty: 14 TABLET | Refills: 0 | Status: SHIPPED | OUTPATIENT
Start: 2025-05-07 | End: 2025-05-21

## 2025-05-12 ENCOUNTER — OFFICE VISIT (OUTPATIENT)
Age: 45
End: 2025-05-12
Payer: COMMERCIAL

## 2025-05-12 VITALS
DIASTOLIC BLOOD PRESSURE: 64 MMHG | WEIGHT: 202 LBS | SYSTOLIC BLOOD PRESSURE: 108 MMHG | HEIGHT: 67 IN | BODY MASS INDEX: 31.71 KG/M2

## 2025-05-12 DIAGNOSIS — N81.6 RECTOCELE: ICD-10-CM

## 2025-05-12 DIAGNOSIS — Z90.710 HISTORY OF HYSTERECTOMY: ICD-10-CM

## 2025-05-12 DIAGNOSIS — N95.2 ATROPHIC VAGINITIS: Primary | ICD-10-CM

## 2025-05-12 NOTE — PATIENT INSTRUCTIONS

## 2025-05-12 NOTE — PROGRESS NOTES
Chief Complaint  Gynecologic Exam (Pt is here with c/o possible vaginal prolapse.  Pt has noticed a change in bowel movements as well as having difficulty with bowel movements. )  History of Present Illness  The patient is a 44-year-old female who presents for evaluation of bowel irregularities and possible prolapse.    She has been experiencing alterations in her bowel movements over the past few weeks, characterized by a sensation of constipation despite the passage of normal stools.   Approximately two weeks ago, she reported an unusual sensation during defecation, which she initially attributed to residual toilet paper. However, upon inspection, she discovered a protrusion that she was able to manually reposition. This incident prompted her to seek medical attention, although it has not recurred since. She does not recall any significant physical exertion or prolonged standing on the day of the incident. She also reports incomplete bowel movements and associated pressure, which she believes may be related to her constipation.    Her primary care physician conducted a comprehensive blood workup last month, revealing undetectable testosterone levels and significantly low estrogen and DHEA levels. She underwent a hysterectomy, retaining her ovaries and fallopian tubes due to intraoperative bleeding. She is scheduled for skin removal and muscle repair surgery in her abdomen in two weeks, following significant weight loss. She has maintained a stable weight within a 10 to 15-pound range for the past year, with a total loss of approximately 40 pounds since 11/2024.   She reports no history of myocardial infarction, cerebrovascular accident, deep vein thrombosis, pulmonary embolism, or arrhythmia. She has a history of migraines. Her last migraine episode occurred over a year ago. She has been experiencing increased fatigue and general malaise.    She reports no exacerbation of her urinary incontinence, which is  "primarily triggered by coughing or sneezing. She has been using Premarin cream and coconut oil, which she finds beneficial for vaginal dryness. She reports no pelvic pain but does experience cramping, a symptom she has not encountered for an extended period.      Subjective          Allison Mckeon presents to [facility]  History of Present Illness    Review of Systems   Genitourinary:         HELEN    Possible prolapse.              Objective   Vital Signs:   /64   Ht 170.2 cm (67\")   Wt 91.6 kg (202 lb)   BMI 31.64 kg/m²     Physical Exam  Vitals reviewed. Exam conducted with a chaperone present.   Constitutional:       Appearance: She is well-developed.   Eyes:      General:         Right eye: No discharge.         Left eye: No discharge.   Cardiovascular:      Rate and Rhythm: Normal rate and regular rhythm.   Pulmonary:      Effort: Pulmonary effort is normal.      Breath sounds: Normal breath sounds.   Genitourinary:     Exam position: Lithotomy position.      Labia:         Right: No rash, tenderness, lesion or injury.         Left: No rash, tenderness, lesion or injury.       Comments: 1st degree rectocele  Skin:     General: Skin is warm.   Neurological:      Mental Status: She is alert and oriented to person, place, and time.   Psychiatric:         Behavior: Behavior normal.         Thought Content: Thought content normal.         Judgment: Judgment normal.       Physical Exam  Vital Signs: Weight is 200 pounds.  Genitourinary:   Bimanual Exam: Grade 1 prolapse noted.    Result Review :   The following data was reviewed by: EZIO Sequeira on 05/12/2025:             Results  Labs   - Testosterone: Undetectable   - Estrogen: Very low   - DHEA: Really low           Assessment & Plan    Bowel irregularities.  - Maintain regular physical activity.  - Ensure bowel movements remain soft, particularly post-surgery.  - Use stool softeners/Miralax if not currently in " use.    Urinary incontinence.  - 1 st degree rectocele observed.  - No significant worsening of urinary incontinence with coughing or sneezing.  - Order ultrasound to evaluate ovaries and rule out underlying issues.  - Recommend pelvic floor physical therapy as the least invasive treatment option.  - Further discussion regarding hormone replacement therapy if symptoms persist or worsen.    Follow-up    Schedule ultrasound and  pelvic floor physical therapy. Follow up post-surgery to reassess hormonal therapy needs.    Diagnoses and all orders for this visit:    1. Atrophic vaginitis (Primary)    2. History of hysterectomy    3. Rectocele                  Follow Up   Return for US and OV.    Patient was given instructions and counseling regarding her condition or for health maintenance advice. Please see specific information pulled into the AVS if appropriate.       Patient or patient representative verbalized consent for the use of Ambient Listening during the visit with  EZIO Sequeira for chart documentation. 5/15/2025  21:55 CDT

## 2025-05-14 ENCOUNTER — DOCUMENTATION (OUTPATIENT)
Dept: FAMILY MEDICINE CLINIC | Facility: CLINIC | Age: 45
End: 2025-05-14
Payer: COMMERCIAL

## 2025-05-14 DIAGNOSIS — F90.9 ADULT ADHD: Primary | ICD-10-CM

## 2025-05-14 RX ORDER — CLARITHROMYCIN 500 MG/1
500 TABLET ORAL 2 TIMES DAILY
Qty: 20 TABLET | Refills: 0 | Status: SHIPPED | OUTPATIENT
Start: 2025-05-14 | End: 2025-05-24

## 2025-05-16 ENCOUNTER — LAB (OUTPATIENT)
Dept: LAB | Facility: HOSPITAL | Age: 45
End: 2025-05-16
Payer: COMMERCIAL

## 2025-05-16 ENCOUNTER — CLINICAL SUPPORT (OUTPATIENT)
Dept: FAMILY MEDICINE CLINIC | Facility: CLINIC | Age: 45
End: 2025-05-16
Payer: COMMERCIAL

## 2025-05-16 DIAGNOSIS — J06.9 ACUTE URI: Primary | ICD-10-CM

## 2025-05-16 DIAGNOSIS — R09.81 NASAL CONGESTION: Primary | ICD-10-CM

## 2025-05-16 LAB
AMPHET+METHAMPHET UR QL: NEGATIVE
AMPHETAMINES UR QL: NEGATIVE
BARBITURATES UR QL SCN: NEGATIVE
BENZODIAZ UR QL SCN: NEGATIVE
BUPRENORPHINE SERPL-MCNC: NEGATIVE NG/ML
CANNABINOIDS SERPL QL: NEGATIVE
COCAINE UR QL: NEGATIVE
FENTANYL UR-MCNC: NEGATIVE NG/ML
METHADONE UR QL SCN: NEGATIVE
OPIATES UR QL: POSITIVE
OXYCODONE UR QL SCN: NEGATIVE
PCP UR QL SCN: NEGATIVE
TRICYCLICS UR QL SCN: NEGATIVE

## 2025-05-16 PROCEDURE — 80307 DRUG TEST PRSMV CHEM ANLYZR: CPT | Performed by: FAMILY MEDICINE

## 2025-05-16 RX ORDER — DEXAMETHASONE SODIUM PHOSPHATE 4 MG/ML
8 INJECTION, SOLUTION INTRA-ARTICULAR; INTRALESIONAL; INTRAMUSCULAR; INTRAVENOUS; SOFT TISSUE ONCE
Status: COMPLETED | OUTPATIENT
Start: 2025-05-16 | End: 2025-05-16

## 2025-05-16 RX ADMIN — DEXAMETHASONE SODIUM PHOSPHATE 8 MG: 4 INJECTION, SOLUTION INTRA-ARTICULAR; INTRALESIONAL; INTRAMUSCULAR; INTRAVENOUS; SOFT TISSUE at 12:18

## 2025-05-19 ENCOUNTER — LAB (OUTPATIENT)
Dept: LAB | Facility: HOSPITAL | Age: 45
End: 2025-05-19
Payer: COMMERCIAL

## 2025-05-19 ENCOUNTER — OFFICE VISIT (OUTPATIENT)
Age: 45
End: 2025-05-19
Payer: COMMERCIAL

## 2025-05-19 VITALS
HEIGHT: 67 IN | DIASTOLIC BLOOD PRESSURE: 78 MMHG | WEIGHT: 202 LBS | SYSTOLIC BLOOD PRESSURE: 118 MMHG | BODY MASS INDEX: 31.71 KG/M2

## 2025-05-19 DIAGNOSIS — Z90.710 HISTORY OF HYSTERECTOMY: ICD-10-CM

## 2025-05-19 DIAGNOSIS — R10.2 PELVIC PAIN: Primary | ICD-10-CM

## 2025-05-19 DIAGNOSIS — F90.9 ATTENTION DEFICIT HYPERACTIVITY DISORDER (ADHD), UNSPECIFIED ADHD TYPE: Primary | ICD-10-CM

## 2025-05-19 DIAGNOSIS — F90.9 ATTENTION DEFICIT HYPERACTIVITY DISORDER (ADHD), UNSPECIFIED ADHD TYPE: ICD-10-CM

## 2025-05-19 DIAGNOSIS — N81.6 RECTOCELE: ICD-10-CM

## 2025-05-19 DIAGNOSIS — F90.9 ADULT ADHD: Primary | ICD-10-CM

## 2025-05-19 LAB
AMPHET+METHAMPHET UR QL: NEGATIVE
AMPHETAMINES UR QL: NEGATIVE
BARBITURATES UR QL SCN: NEGATIVE
BENZODIAZ UR QL SCN: NEGATIVE
BUPRENORPHINE SERPL-MCNC: NEGATIVE NG/ML
CANNABINOIDS SERPL QL: NEGATIVE
COCAINE UR QL: NEGATIVE
FENTANYL UR-MCNC: NEGATIVE NG/ML
METHADONE UR QL SCN: NEGATIVE
OPIATES UR QL: NEGATIVE
OXYCODONE UR QL SCN: NEGATIVE
PCP UR QL SCN: NEGATIVE
TRICYCLICS UR QL SCN: NEGATIVE

## 2025-05-19 PROCEDURE — 80307 DRUG TEST PRSMV CHEM ANLYZR: CPT

## 2025-05-19 RX ORDER — DEXTROAMPHETAMINE SACCHARATE, AMPHETAMINE ASPARTATE, DEXTROAMPHETAMINE SULFATE AND AMPHETAMINE SULFATE 2.5; 2.5; 2.5; 2.5 MG/1; MG/1; MG/1; MG/1
10 TABLET ORAL 2 TIMES DAILY
Qty: 60 TABLET | Refills: 0 | Status: SHIPPED | OUTPATIENT
Start: 2025-05-19 | End: 2025-06-18

## 2025-05-19 NOTE — PATIENT INSTRUCTIONS

## 2025-05-19 NOTE — PROGRESS NOTES
"Chief Complaint  Pelvic Pain ( Pt is here for a f/u with US to evaluate ovaries and rule out any underlying issues. )  History of Present Illness  The patient presents for evaluation of pelvic discomfort.    She reports a history of hysterectomy, during which her ovaries were preserved. The initial surgical plan included the removal of the uterus and fallopian tubes; however, due to intraoperative bleeding, the procedure was halted before the tubes could be excised.     She is currently awaiting physical therapy and has been incorporating additional fiber into her diet over the past few weeks to help regulate BM.  She has observed a change in her bowel habits, which she attributes to a potential prolapse. Despite not experiencing bloating, she reports daily bowel movements that vary from requiring significant effort to being extremely urgent. She maintains a regular intake of vegetables in her diet, although limited by her previous gastric sleeve surgery. Her diet is primarily composed of protein and vegetables, with minimal sugar and carbohydrate intake. She experiences difficulty in maintaining hydration, particularly when ill or suffering from diarrhea. She consumes Gatorade Zero and packets in her coffee daily to aid in hydration.  She has attempted to manage these symptoms with increased fiber intake and probiotics, but without noticeable improvement.    PAST SURGICAL HISTORY:  - Hysterectomy with preservation of ovaries  - Gastric sleeve surgery    Subjective          Allison Mckeon presents to [facility]  History of Present Illness    Review of Systems   Gastrointestinal:         Bowel changes     Genitourinary:  Positive for pelvic pain.         Objective   Vital Signs:   /78   Ht 170.2 cm (67\")   Wt 91.6 kg (202 lb)   BMI 31.64 kg/m²     Physical Exam  Vitals reviewed.   Constitutional:       Appearance: She is well-developed.   Eyes:      General:         Right eye: No " discharge.         Left eye: No discharge.   Cardiovascular:      Rate and Rhythm: Normal rate and regular rhythm.   Pulmonary:      Effort: Pulmonary effort is normal.      Breath sounds: Normal breath sounds.   Skin:     General: Skin is warm.   Neurological:      Mental Status: She is alert and oriented to person, place, and time.   Psychiatric:         Behavior: Behavior normal.         Thought Content: Thought content normal.         Judgment: Judgment normal.       Physical Exam  Gastrointestinal: Abdomen examination revealed significant gas or bowel loops obstructing the view of the ovaries and tubes.    Result Review :   The following data was reviewed by: EZIO Sequeira on 05/19/2025:    Data reviewed : Radiologic studies transvaginal US    Impression:     1.  Uterus: Surgically absent     2.  Endometrium: Surgical absent     3.  Myometrium: Surgically absent     4.  Ovaries  Left:    Not visualized  Right:  Not visualized     No pelvic masses appreciated.     Relevant comparison data: No relevant comparison data  Delroy Guevara MD  5/19/2025 10:55 CDT        Current Outpatient Medications on File Prior to Visit   Medication Sig    albuterol sulfate  (90 Base) MCG/ACT inhaler Inhale 2 puffs Every 4 (Four) Hours As Needed for Wheezing.    amphetamine-dextroamphetamine (Adderall) 10 MG tablet Take 1 tablet by mouth 2 (Two) Times a Day.    azelaic acid (AZELEX) 15 % gel Apply 1 Application topically to the appropriate area as directed 2 (Two) Times a Day.    butalbital-acetaminophen-caffeine (FIORICET, ESGIC) -40 MG per tablet Take 1 tablet by mouth Every 6 (Six) Hours As Needed for Migraine.    Calcium Citrate-Vitamin D (CALCIUM CITRATE CHEWY BITE PO) Take 1 dose by mouth 3 (Three) Times a Day.    clarithromycin (BIAXIN) 500 MG tablet Take 1 tablet by mouth 2 (Two) Times a Day for 10 days.    clotrimazole-betamethasone (LOTRISONE) 1-0.05 % cream Apply 1 Application topically to the  appropriate area as directed 2 (Two) Times a Day.    cyanocobalamin 1000 MCG/ML injection Inject 1 mL into the appropriate muscle as directed by prescriber Every 30 (Thirty) Days. VITAMIN B12    Estrogens Conjugated (Premarin) 0.625 MG/GM vaginal cream 0.5 g inserted vaginally, twice weekly.    fluconazole (Diflucan) 200 MG tablet Take 1 tablet by mouth Daily for 14 days.    Multiple Vitamins-Minerals (BARIATRIC MULTIVITAMINS/IRON PO) Take 1 tablet by mouth Daily.    nystatin (MYCOSTATIN) 100,000 unit/mL suspension Swish and swallow 5 mL 4 (Four) Times a Day.    Probiotic Product (FloraVantage) capsule Take 1 each by mouth Daily. CHEWABLE    sertraline (ZOLOFT) 100 MG tablet Take 1.5 tablets by mouth Daily.    Syringe, Disposable, 1 ML misc Use 1 each 1 (One) Time Per Week.    Tirzepatide-Weight Management (ZEPBOUND) 5 MG/0.5ML solution auto-injector Inject 0.5 mL under the skin into the appropriate area as directed 1 (One) Time Per Week.    tretinoin (RETIN-A) 0.025 % cream Apply 1 Application topically to the appropriate area as directed Every Night.    vitamin D (ERGOCALCIFEROL) 1.25 MG (91042 UT) capsule capsule Take 1 capsule by mouth 1 (One) Time Per Week.     No current facility-administered medications on file prior to visit.            Assessment & Plan  1. Pelvic pain.  Focus on regulating bowel movements and maintaining hydration. Schedule follow-up ultrasound in approximately 3 months to ensure ovaries appear normal.    2. Bowel irregularity.  Continue high-fiber diet and probiotics. Monitor symptoms and consider further evaluation if no improvement with GI.    Follow-up  Follow up in 3 months.    Diagnoses and all orders for this visit:    1. Pelvic pain (Primary)    2. History of hysterectomy    3. Rectocele                  Follow Up   Return in 3 months (on 8/19/2025) for US and OV.    Patient was given instructions and counseling regarding her condition or for health maintenance advice. Please see  specific information pulled into the AVS if appropriate.       Patient or patient representative verbalized consent for the use of Ambient Listening during the visit with  EZIO Sequeira for chart documentation. 5/19/2025  11:39 CDT

## 2025-06-13 ENCOUNTER — OFFICE VISIT (OUTPATIENT)
Dept: FAMILY MEDICINE CLINIC | Facility: CLINIC | Age: 45
End: 2025-06-13
Payer: COMMERCIAL

## 2025-06-13 VITALS
BODY MASS INDEX: 32.18 KG/M2 | SYSTOLIC BLOOD PRESSURE: 136 MMHG | HEART RATE: 100 BPM | WEIGHT: 205 LBS | OXYGEN SATURATION: 97 % | DIASTOLIC BLOOD PRESSURE: 82 MMHG | HEIGHT: 67 IN | TEMPERATURE: 97.6 F

## 2025-06-13 DIAGNOSIS — E66.813 CLASS 3 SEVERE OBESITY WITH BODY MASS INDEX (BMI) OF 40.0 TO 44.9 IN ADULT, UNSPECIFIED OBESITY TYPE, UNSPECIFIED WHETHER SERIOUS COMORBIDITY PRESENT: ICD-10-CM

## 2025-06-13 DIAGNOSIS — E66.01 CLASS 3 SEVERE OBESITY WITH BODY MASS INDEX (BMI) OF 40.0 TO 44.9 IN ADULT, UNSPECIFIED OBESITY TYPE, UNSPECIFIED WHETHER SERIOUS COMORBIDITY PRESENT: ICD-10-CM

## 2025-06-13 DIAGNOSIS — I10 PRIMARY HYPERTENSION: ICD-10-CM

## 2025-06-13 DIAGNOSIS — Z48.02 ENCOUNTER FOR REMOVAL OF SUTURES: Primary | ICD-10-CM

## 2025-06-13 NOTE — PROGRESS NOTES
"Chief Complaint  Suture / Staple Removal (Hips )    Subjective    History of Present Illness      Patient presents to [facility] for   History of Present Illness  Patient is here today c/o Suture / Staple Removal (Hips )  Suture / Staple Removal       History of Present Illness      Review of Systems   All other systems reviewed and are negative.      I have reviewed and agree with the HPI and ROS information as above.  Marta Wills, APRN     Objective   Vital Signs:   /82 (BP Location: Right arm, Patient Position: Sitting, Cuff Size: Adult)   Pulse 100   Temp 97.6 °F (36.4 °C) (Infrared)   Ht 170.2 cm (67\")   Wt 93 kg (205 lb)   SpO2 97%   BMI 32.11 kg/m²            Physical Exam  Abdominal:          Comments: Healing abdominal incision anterior and posteriorly           LUDMILA-7:      PHQ-2 Depression Screening    Little interest or pleasure in doing things?     Feeling down, depressed, or hopeless?     PHQ-2 Total Score        PHQ-9 Depression Screening  Little interest or pleasure in doing things?     Feeling down, depressed, or hopeless?     PHQ-2 Total Score     Trouble falling or staying asleep, or sleeping too much?     Feeling tired or having little energy?     Poor appetite or overeating?     Feeling bad about yourself - or that you are a failure or have let yourself or your family down?     Trouble concentrating on things, such as reading the newspaper or watching television?     Moving or speaking so slowly that other people could have noticed? Or the opposite - being so fidgety or restless that you have been moving around a lot more than usual?     Thoughts that you would be better off dead, or of hurting yourself in some way?     PHQ-9 Total Score     If you checked off any problems, how difficult have these problems made it for you to do your work, take care of things at home, or get along with other people?             Result Review  Data Reviewed:        "     Suture Removal    Date/Time: 6/13/2025 2:21 PM    Performed by: Marta Wills APRN  Authorized by: Marta Wills APRN  Body area: trunk  Location details: abdomen  Wound Appearance: draining  Sutures Removed: 2  Post-removal: dressing applied  Patient tolerance: patient tolerated the procedure well with no immediate complications            Assessment and Plan      Diagnoses and all orders for this visit:    1. Encounter for removal of sutures (Primary)    2. Class 3 severe obesity with body mass index (BMI) of 40.0 to 44.9 in adult, unspecified obesity type, unspecified whether serious comorbidity present  -     Tirzepatide-Weight Management (ZEPBOUND) 5 MG/0.5ML solution auto-injector; Inject 0.5 mL under the skin into the appropriate area as directed 1 (One) Time Per Week.  Dispense: 6 mL; Refill: 0    3. Primary hypertension  -     Tirzepatide-Weight Management (ZEPBOUND) 5 MG/0.5ML solution auto-injector; Inject 0.5 mL under the skin into the appropriate area as directed 1 (One) Time Per Week.  Dispense: 6 mL; Refill: 0    Other orders  -     Suture Removal      Patient is seen today after having liposuction along with tummy tuck to have sutures removed. Patient does have internal sutures but she did send a picture of a draining area on her abdominal incision and he stated there was a suture that needed to be removed that was coming outward from internally. Patient does have very mild drainage today but I was able to see the suture and have this removed today in office. Patients bandages were reapplied with aquaphor and dressed.    Patient is also requesting to have a refill of her zepbound as she states that she would like to have this in the event this is something that she decides to remain on once she is healed.   Assessment & Plan          Follow Up   Return if symptoms worsen or fail to improve.  Patient was given instructions and counseling regarding her condition or for health maintenance  advice. Please see specific information pulled into the AVS if appropriate.     Patient or patient representative verbalized consent for the use of Ambient Listening during the visit with  EZIO Krueger for chart documentation. 6/13/2025  13:03 CDT

## 2025-07-28 ENCOUNTER — LAB (OUTPATIENT)
Dept: LAB | Facility: HOSPITAL | Age: 45
End: 2025-07-28
Payer: COMMERCIAL

## 2025-07-28 ENCOUNTER — OFFICE VISIT (OUTPATIENT)
Dept: FAMILY MEDICINE CLINIC | Facility: CLINIC | Age: 45
End: 2025-07-28
Payer: COMMERCIAL

## 2025-07-28 VITALS
BODY MASS INDEX: 31.23 KG/M2 | WEIGHT: 199 LBS | OXYGEN SATURATION: 98 % | HEART RATE: 95 BPM | SYSTOLIC BLOOD PRESSURE: 116 MMHG | DIASTOLIC BLOOD PRESSURE: 82 MMHG | HEIGHT: 67 IN

## 2025-07-28 DIAGNOSIS — E55.9 VITAMIN D INSUFFICIENCY: ICD-10-CM

## 2025-07-28 DIAGNOSIS — I10 PRIMARY HYPERTENSION: ICD-10-CM

## 2025-07-28 DIAGNOSIS — E53.8 VITAMIN B12 DEFICIENCY: ICD-10-CM

## 2025-07-28 DIAGNOSIS — R23.2 HOT FLASHES: ICD-10-CM

## 2025-07-28 DIAGNOSIS — L98.8 AGE-RELATED FACIAL WRINKLES: ICD-10-CM

## 2025-07-28 DIAGNOSIS — R45.86 MOOD SWINGS: ICD-10-CM

## 2025-07-28 DIAGNOSIS — L71.9 ROSACEA: ICD-10-CM

## 2025-07-28 DIAGNOSIS — F41.9 ANXIETY AND DEPRESSION: Primary | ICD-10-CM

## 2025-07-28 DIAGNOSIS — R53.83 FATIGUE, UNSPECIFIED TYPE: ICD-10-CM

## 2025-07-28 DIAGNOSIS — B37.0 OROPHARYNGEAL CANDIDIASIS: ICD-10-CM

## 2025-07-28 DIAGNOSIS — F32.A ANXIETY AND DEPRESSION: Primary | ICD-10-CM

## 2025-07-28 DIAGNOSIS — Z86.69 HISTORY OF MIGRAINE: ICD-10-CM

## 2025-07-28 DIAGNOSIS — E66.813 CLASS 3 SEVERE OBESITY WITH BODY MASS INDEX (BMI) OF 40.0 TO 44.9 IN ADULT, UNSPECIFIED OBESITY TYPE, UNSPECIFIED WHETHER SERIOUS COMORBIDITY PRESENT: ICD-10-CM

## 2025-07-28 PROCEDURE — 82533 TOTAL CORTISOL: CPT

## 2025-07-28 PROCEDURE — 83002 ASSAY OF GONADOTROPIN (LH): CPT

## 2025-07-28 PROCEDURE — 36415 COLL VENOUS BLD VENIPUNCTURE: CPT

## 2025-07-28 PROCEDURE — 84439 ASSAY OF FREE THYROXINE: CPT

## 2025-07-28 PROCEDURE — 84403 ASSAY OF TOTAL TESTOSTERONE: CPT

## 2025-07-28 PROCEDURE — 84481 FREE ASSAY (FT-3): CPT

## 2025-07-28 PROCEDURE — 84144 ASSAY OF PROGESTERONE: CPT

## 2025-07-28 PROCEDURE — 99214 OFFICE O/P EST MOD 30 MIN: CPT

## 2025-07-28 PROCEDURE — 82306 VITAMIN D 25 HYDROXY: CPT

## 2025-07-28 PROCEDURE — 84443 ASSAY THYROID STIM HORMONE: CPT

## 2025-07-28 PROCEDURE — 82607 VITAMIN B-12: CPT

## 2025-07-28 PROCEDURE — 82670 ASSAY OF TOTAL ESTRADIOL: CPT

## 2025-07-28 PROCEDURE — 83001 ASSAY OF GONADOTROPIN (FSH): CPT

## 2025-07-28 PROCEDURE — 82626 DEHYDROEPIANDROSTERONE: CPT

## 2025-07-28 RX ORDER — ERGOCALCIFEROL 1.25 MG/1
50000 CAPSULE, LIQUID FILLED ORAL WEEKLY
Qty: 12 CAPSULE | Refills: 2 | Status: SHIPPED | OUTPATIENT
Start: 2025-07-28

## 2025-07-28 RX ORDER — TRETINOIN 0.25 MG/G
1 CREAM TOPICAL NIGHTLY
Qty: 60 G | Refills: 2 | Status: SHIPPED | OUTPATIENT
Start: 2025-07-28

## 2025-07-28 RX ORDER — AZELAIC ACID 0.15 G/G
1 GEL TOPICAL 2 TIMES DAILY
Qty: 50 G | Refills: 2 | Status: SHIPPED | OUTPATIENT
Start: 2025-07-28

## 2025-07-28 NOTE — PROGRESS NOTES
Chief Complaint  hormone therapy    Subjective    History of Present Illness      Patient presents to [facility] for   History of Present Illness  Pt presents today with multiple issues.  Discuss potential Sertraline replacement (evaluate side effects, alternatives)   · Review hormone therapy follow-up   · Address current symptoms (e.g. mood, sleep, energy, skin changes)   · Determine need for labs or HRT adjustments   · Request medication refills (review current prescriptions and ensure continuity)     States she weaned herself off of sertraline and been without it for 6 days     History of Present Illness  The patient presents for evaluation of hormone replacement therapy and medication management.    She has been experiencing hot flashes at night and feelings of exhaustion, which she attributes to her hormone levels. She is interested in exploring hormone replacement therapy. She continues to take Premarin and has one refill left. Patient has had a hysterectomy in the past.     She has discontinued her sertraline medication, which she had been taking for several years. She experienced severe side effects, including a burning sensation and sickness when trying to swallow the medication. These symptoms occurred twice, prompting her to gradually reduce the dosage over a week before stopping it completely. Since then, she has been feeling well overall, although she has noticed an increase in moodiness and feelings of being easily overwhelmed but she has become self aware of these things. She is unsure if these symptoms are related to discontinuing the sertraline or if they are due to other factors. Patient does not wish to start medication at this time.     She is currently using tretinoin cream on her scar as recommended by her surgeon. She is also using azelaic acid gel and vitamin D supplements. She is seeking refills for all her medications except sertraline. She has sufficient  "supplies of B12, Lotrisone cream, and Fioricet.    Review of Systems   All other systems reviewed and are negative.      I have reviewed and agree with the HPI and ROS information as above.  Marta Wills, EZIO     Objective   Vital Signs:   /82   Pulse 95   Ht 170.2 cm (67.01\")   Wt 90.3 kg (199 lb)   SpO2 98%   BMI 31.16 kg/m²            Physical Exam  Constitutional:       Appearance: Normal appearance. She is well-developed. She is obese.   HENT:      Head: Normocephalic and atraumatic.      Right Ear: Tympanic membrane, ear canal and external ear normal.      Left Ear: Tympanic membrane, ear canal and external ear normal.      Nose: Nose normal. No septal deviation, nasal tenderness or congestion.      Mouth/Throat:      Lips: Pink. No lesions.      Mouth: Mucous membranes are moist. No oral lesions.      Dentition: Normal dentition.      Pharynx: Oropharynx is clear. No pharyngeal swelling, oropharyngeal exudate or posterior oropharyngeal erythema.   Eyes:      General: Lids are normal. Vision grossly intact. No scleral icterus.        Right eye: No discharge.         Left eye: No discharge.      Extraocular Movements: Extraocular movements intact.      Conjunctiva/sclera: Conjunctivae normal.      Right eye: Right conjunctiva is not injected.      Left eye: Left conjunctiva is not injected.      Pupils: Pupils are equal, round, and reactive to light.   Neck:      Thyroid: No thyroid mass.      Trachea: Trachea normal.   Cardiovascular:      Rate and Rhythm: Normal rate and regular rhythm.      Heart sounds: Normal heart sounds. No murmur heard.     No gallop.   Pulmonary:      Effort: Pulmonary effort is normal.      Breath sounds: Normal breath sounds and air entry. No wheezing, rhonchi or rales.   Abdominal:      General: There is no distension.      Palpations: Abdomen is soft. There is no mass.      Tenderness: There is no abdominal tenderness. There is no right CVA tenderness, left CVA " tenderness, guarding or rebound.   Musculoskeletal:         General: No tenderness or deformity. Normal range of motion.      Cervical back: Full passive range of motion without pain, normal range of motion and neck supple.      Thoracic back: Normal.      Right lower leg: No edema.      Left lower leg: No edema.   Skin:     General: Skin is warm and dry.      Coloration: Skin is not jaundiced.      Findings: No rash.   Neurological:      Mental Status: She is alert and oriented to person, place, and time.      Sensory: Sensation is intact.      Motor: Motor function is intact.      Coordination: Coordination is intact.      Gait: Gait is intact.      Deep Tendon Reflexes: Reflexes are normal and symmetric.   Psychiatric:         Mood and Affect: Mood and affect normal.         Judgment: Judgment normal.          LUDMILA-7:      PHQ-2 Depression Screening    Little interest or pleasure in doing things?     Feeling down, depressed, or hopeless?     PHQ-2 Total Score        PHQ-9 Depression Screening  Little interest or pleasure in doing things?     Feeling down, depressed, or hopeless?     PHQ-2 Total Score     Trouble falling or staying asleep, or sleeping too much?     Feeling tired or having little energy?     Poor appetite or overeating?     Feeling bad about yourself - or that you are a failure or have let yourself or your family down?     Trouble concentrating on things, such as reading the newspaper or watching television?     Moving or speaking so slowly that other people could have noticed? Or the opposite - being so fidgety or restless that you have been moving around a lot more than usual?     Thoughts that you would be better off dead, or of hurting yourself in some way?     PHQ-9 Total Score     If you checked off any problems, how difficult have these problems made it for you to do your work, take care of things at home, or get along with other people?             Result Review  Data Reviewed:                    Assessment and Plan      Diagnoses and all orders for this visit:    1. Anxiety and depression (Primary)    2. Age-related facial wrinkles  -     tretinoin (RETIN-A) 0.025 % cream; Apply 1 Application topically to the appropriate area as directed Every Night.  Dispense: 60 g; Refill: 2    3. Rosacea  -     azelaic acid (AZELEX) 15 % gel; Apply 1 Application topically to the appropriate area as directed 2 (Two) Times a Day.  Dispense: 50 g; Refill: 2    4. Fatigue, unspecified type  -     Cortisol; Future  -     FSH & LH; Future  -     DHEA; Future  -     Estradiol; Future  -     Progesterone; Future  -     TSH; Future  -     Testosterone; Future  -     T4, free; Future  -     T3, free; Future    5. Hot flashes  -     Cortisol; Future  -     FSH & LH; Future  -     DHEA; Future  -     Estradiol; Future  -     Progesterone; Future  -     TSH; Future  -     Testosterone; Future  -     T4, free; Future  -     T3, free; Future    6. Mood swings  -     Cortisol; Future  -     FSH & LH; Future  -     DHEA; Future  -     Estradiol; Future  -     Progesterone; Future  -     TSH; Future  -     Testosterone; Future  -     T4, free; Future  -     T3, free; Future    7. History of migraine    8. Oropharyngeal candidiasis    9. Vitamin D insufficiency  -     vitamin D (ERGOCALCIFEROL) 1.25 MG (97450 UT) capsule capsule; Take 1 capsule by mouth 1 (One) Time Per Week.  Dispense: 12 capsule; Refill: 2  -     Vitamin D 25 hydroxy; Future    10. Class 3 severe obesity with body mass index (BMI) of 40.0 to 44.9 in adult, unspecified obesity type, unspecified whether serious comorbidity present  -     Tirzepatide-Weight Management (ZEPBOUND) 5 MG/0.5ML solution auto-injector; Inject 0.5 mL under the skin into the appropriate area as directed 1 (One) Time Per Week.  Dispense: 6 mL; Refill: 1    11. Primary hypertension  -     Tirzepatide-Weight Management (ZEPBOUND) 5 MG/0.5ML solution auto-injector; Inject 0.5 mL under the skin  into the appropriate area as directed 1 (One) Time Per Week.  Dispense: 6 mL; Refill: 1    12. Vitamin B12 deficiency  -     Vitamin B12; Future      Assessment & Plan  1. Hormone replacement therapy.  - Reports experiencing hot flashes and exhaustion, which she attributes to hormonal imbalances.  - Hormone levels will be assessed through lab tests. The results will be sent to a [facility] pharmacy for compounding.  - The pharmacy will determine the appropriate dosages of estrogen, progesterone, DHEA, and other necessary hormones based on her lab results and medical history.  - She will continue using Premarin until the hormone levels are evaluated.    2. Medication management.  - She has weaned herself off sertraline due to adverse effects, including severe reflux and anxiety.  - She has not taken sertraline for a week and reports feeling mostly okay but easily overwhelmed.  - She will not continue sertraline. Patient does not wish to start a med for anxiety at this time. We did discuss PRN medications if she does decided she needs something such as buspar.   - A prescription for tretinoin cream 60 g with 2 refills has been provided. She will continue her current medications, including B12, Lotrisone cream, and Fioricet as needed.          Follow Up   Return if symptoms worsen or fail to improve.  Patient was given instructions and counseling regarding her condition or for health maintenance advice. Please see specific information pulled into the AVS if appropriate.     Patient or patient representative verbalized consent for the use of Ambient Listening during the visit with  EZIO Krueger for chart documentation. 7/28/2025  14:32 CDT

## 2025-07-29 LAB
25(OH)D3 SERPL-MCNC: 42.3 NG/ML (ref 30–100)
CORTIS SERPL-MCNC: <0.11 MCG/DL
ESTRADIOL SERPL HS-MCNC: <5 PG/ML
FSH SERPL-ACNC: 40.6 MIU/ML
LH SERPL-ACNC: 23.5 MIU/ML
PROGEST SERPL-MCNC: <0.05 NG/ML
T3FREE SERPL-MCNC: 2.59 PG/ML (ref 2–4.4)
T4 FREE SERPL-MCNC: 1.26 NG/DL (ref 0.92–1.68)
TESTOST SERPL-MCNC: 5.06 NG/DL (ref 8.4–48.1)
TSH SERPL DL<=0.05 MIU/L-ACNC: 0.03 UIU/ML (ref 0.27–4.2)
VIT B12 BLD-MCNC: 598 PG/ML (ref 211–946)

## 2025-08-01 ENCOUNTER — DOCUMENTATION (OUTPATIENT)
Dept: FAMILY MEDICINE CLINIC | Facility: CLINIC | Age: 45
End: 2025-08-01
Payer: COMMERCIAL

## 2025-08-02 LAB — DHEA SERPL-MCNC: 92 NG/DL (ref 31–701)

## 2025-08-04 DIAGNOSIS — R79.89 HIGH SERUM CORTISOL: Primary | ICD-10-CM

## 2025-08-04 DIAGNOSIS — R79.89 LOW THYROID STIMULATING HORMONE (TSH) LEVEL: ICD-10-CM

## 2025-08-06 ENCOUNTER — LAB (OUTPATIENT)
Dept: LAB | Facility: HOSPITAL | Age: 45
End: 2025-08-06
Payer: COMMERCIAL

## 2025-08-06 ENCOUNTER — TELEPHONE (OUTPATIENT)
Dept: FAMILY MEDICINE CLINIC | Facility: CLINIC | Age: 45
End: 2025-08-06
Payer: COMMERCIAL

## 2025-08-06 DIAGNOSIS — R79.89 HIGH SERUM CORTISOL: ICD-10-CM

## 2025-08-08 ENCOUNTER — LAB (OUTPATIENT)
Dept: LAB | Facility: HOSPITAL | Age: 45
End: 2025-08-08
Payer: COMMERCIAL

## 2025-08-08 DIAGNOSIS — R79.89 LOW THYROID STIMULATING HORMONE (TSH) LEVEL: ICD-10-CM

## 2025-08-08 LAB — TSH SERPL DL<=0.05 MIU/L-ACNC: 1.54 UIU/ML (ref 0.27–4.2)

## 2025-08-08 PROCEDURE — 84443 ASSAY THYROID STIM HORMONE: CPT

## 2025-08-08 PROCEDURE — 82024 ASSAY OF ACTH: CPT

## 2025-08-08 PROCEDURE — 36415 COLL VENOUS BLD VENIPUNCTURE: CPT

## 2025-08-10 LAB — ACTH PLAS-MCNC: 6.6 PG/ML (ref 7.2–63.3)

## 2025-08-11 DIAGNOSIS — R82.998 LOW URINARY CORTISOL: Primary | ICD-10-CM

## 2025-08-11 DIAGNOSIS — R79.89 LOW SERUM CORTISOL LEVEL: Primary | ICD-10-CM

## 2025-08-11 DIAGNOSIS — R79.89 LOW SERUM CORTISOL LEVEL: ICD-10-CM

## 2025-08-11 DIAGNOSIS — R79.89 LOW SERUM ADRENOCORTICOTROPHIC HORMONE (ACTH): ICD-10-CM

## 2025-08-11 RX ORDER — HYDROCORTISONE 5 MG/1
10 TABLET ORAL 2 TIMES DAILY
Qty: 40 TABLET | Refills: 0 | Status: SHIPPED | OUTPATIENT
Start: 2025-08-11 | End: 2025-08-21

## 2025-08-12 ENCOUNTER — LAB (OUTPATIENT)
Dept: LAB | Facility: HOSPITAL | Age: 45
End: 2025-08-12
Payer: COMMERCIAL

## 2025-08-12 ENCOUNTER — OFFICE VISIT (OUTPATIENT)
Dept: FAMILY MEDICINE CLINIC | Facility: CLINIC | Age: 45
End: 2025-08-12
Payer: COMMERCIAL

## 2025-08-12 VITALS
DIASTOLIC BLOOD PRESSURE: 86 MMHG | RESPIRATION RATE: 20 BRPM | OXYGEN SATURATION: 98 % | HEIGHT: 67 IN | SYSTOLIC BLOOD PRESSURE: 120 MMHG | WEIGHT: 197.2 LBS | HEART RATE: 72 BPM | BODY MASS INDEX: 30.95 KG/M2 | TEMPERATURE: 98.6 F

## 2025-08-12 DIAGNOSIS — E66.811 CLASS 1 OBESITY DUE TO EXCESS CALORIES WITH SERIOUS COMORBIDITY AND BODY MASS INDEX (BMI) OF 30.0 TO 30.9 IN ADULT: ICD-10-CM

## 2025-08-12 DIAGNOSIS — R00.0 TACHYCARDIA: ICD-10-CM

## 2025-08-12 DIAGNOSIS — R53.83 FATIGUE, UNSPECIFIED TYPE: ICD-10-CM

## 2025-08-12 DIAGNOSIS — R79.89 LOW SERUM CORTISOL LEVEL: ICD-10-CM

## 2025-08-12 DIAGNOSIS — Z78.0 POST-MENOPAUSE: ICD-10-CM

## 2025-08-12 DIAGNOSIS — F90.9 ADULT ADHD: ICD-10-CM

## 2025-08-12 DIAGNOSIS — E66.09 CLASS 1 OBESITY DUE TO EXCESS CALORIES WITH SERIOUS COMORBIDITY AND BODY MASS INDEX (BMI) OF 30.0 TO 30.9 IN ADULT: ICD-10-CM

## 2025-08-12 DIAGNOSIS — R41.89 BRAIN FOG: Primary | ICD-10-CM

## 2025-08-12 DIAGNOSIS — R41.89 BRAIN FOG: ICD-10-CM

## 2025-08-12 LAB
ALBUMIN SERPL-MCNC: 4.5 G/DL (ref 3.5–5)
ALBUMIN/GLOB SERPL: 1.6 G/DL (ref 1.1–2.5)
ALP SERPL-CCNC: 54 U/L (ref 24–120)
ALT SERPL W P-5'-P-CCNC: 15 U/L (ref 0–35)
ANION GAP SERPL CALCULATED.3IONS-SCNC: 8 MMOL/L (ref 4–13)
AST SERPL-CCNC: 20 U/L (ref 7–45)
AUTO MIXED CELLS #: 0.5 10*3/MM3 (ref 0.1–2.6)
AUTO MIXED CELLS %: 6.3 % (ref 0.1–24)
BILIRUB SERPL-MCNC: 0.2 MG/DL (ref 0.1–1)
BUN SERPL-MCNC: 15 MG/DL (ref 5–21)
BUN/CREAT SERPL: 25
CALCIUM SPEC-SCNC: 9.3 MG/DL (ref 8.6–10.5)
CHLORIDE SERPL-SCNC: 103 MMOL/L (ref 98–110)
CO2 SERPL-SCNC: 27 MMOL/L (ref 24–31)
CREAT SERPL-MCNC: 0.6 MG/DL (ref 0.5–1.4)
EGFRCR SERPLBLD CKD-EPI 2021: 111.6 ML/MIN/1.73
ERYTHROCYTE [DISTWIDTH] IN BLOOD BY AUTOMATED COUNT: 13.5 % (ref 12.3–15.4)
ERYTHROCYTE [SEDIMENTATION RATE] IN BLOOD: 6 MM/HR (ref 0–20)
GLOBULIN UR ELPH-MCNC: 2.9 GM/DL
GLUCOSE SERPL-MCNC: 90 MG/DL (ref 65–99)
HCT VFR BLD AUTO: 42.5 % (ref 34–46.6)
HGB BLD-MCNC: 14 G/DL (ref 12–15.9)
LYMPHOCYTES # BLD AUTO: 2.7 10*3/MM3 (ref 0.7–3.1)
LYMPHOCYTES NFR BLD AUTO: 37.5 % (ref 19.6–45.3)
MCH RBC QN AUTO: 27.8 PG (ref 26.6–33)
MCHC RBC AUTO-ENTMCNC: 32.9 G/DL (ref 31.5–35.7)
MCV RBC AUTO: 84.5 FL (ref 79–97)
NEUTROPHILS NFR BLD AUTO: 4 10*3/MM3 (ref 1.7–7)
NEUTROPHILS NFR BLD AUTO: 56.2 % (ref 42.7–76)
PLATELET # BLD AUTO: 339 10*3/MM3 (ref 140–450)
PMV BLD AUTO: 8.7 FL (ref 6–12)
POTASSIUM SERPL-SCNC: 4.1 MMOL/L (ref 3.5–5.3)
PROT SERPL-MCNC: 7.4 G/DL (ref 6.3–8.7)
RBC # BLD AUTO: 5.03 10*6/MM3 (ref 3.77–5.28)
SODIUM SERPL-SCNC: 138 MMOL/L (ref 135–145)
WBC NRBC COR # BLD AUTO: 7.2 10*3/MM3 (ref 3.4–10.8)

## 2025-08-12 PROCEDURE — 86038 ANTINUCLEAR ANTIBODIES: CPT

## 2025-08-12 PROCEDURE — 80050 GENERAL HEALTH PANEL: CPT

## 2025-08-12 PROCEDURE — 99214 OFFICE O/P EST MOD 30 MIN: CPT

## 2025-08-12 PROCEDURE — 36415 COLL VENOUS BLD VENIPUNCTURE: CPT

## 2025-08-12 PROCEDURE — 85652 RBC SED RATE AUTOMATED: CPT

## 2025-08-13 LAB
ANA SER QL: NEGATIVE
TSH SERPL DL<=0.05 MIU/L-ACNC: 0.77 UIU/ML (ref 0.27–4.2)

## 2025-08-18 DIAGNOSIS — F90.9 ATTENTION DEFICIT HYPERACTIVITY DISORDER (ADHD), UNSPECIFIED ADHD TYPE: Primary | ICD-10-CM

## 2025-08-18 RX ORDER — DEXTROAMPHETAMINE SACCHARATE, AMPHETAMINE ASPARTATE, DEXTROAMPHETAMINE SULFATE AND AMPHETAMINE SULFATE 2.5; 2.5; 2.5; 2.5 MG/1; MG/1; MG/1; MG/1
10 TABLET ORAL DAILY
Qty: 30 TABLET | Refills: 0 | Status: SHIPPED | OUTPATIENT
Start: 2025-08-18 | End: 2025-09-17

## 2025-08-18 RX ORDER — DEXTROAMPHETAMINE SACCHARATE, AMPHETAMINE ASPARTATE, DEXTROAMPHETAMINE SULFATE AND AMPHETAMINE SULFATE 2.5; 2.5; 2.5; 2.5 MG/1; MG/1; MG/1; MG/1
10 TABLET ORAL DAILY
Qty: 30 TABLET | Refills: 0 | Status: SHIPPED | OUTPATIENT
Start: 2025-09-15 | End: 2025-10-15

## 2025-08-21 ENCOUNTER — DOCUMENTATION (OUTPATIENT)
Dept: FAMILY MEDICINE CLINIC | Facility: CLINIC | Age: 45
End: 2025-08-21
Payer: COMMERCIAL

## 2025-08-21 DIAGNOSIS — R79.89 LOW SERUM CORTISOL LEVEL: ICD-10-CM

## 2025-08-21 DIAGNOSIS — R11.0 NAUSEA: Primary | ICD-10-CM

## 2025-08-21 RX ORDER — ONDANSETRON 4 MG/1
4 TABLET, FILM COATED ORAL EVERY 8 HOURS PRN
Qty: 90 TABLET | Refills: 0 | Status: SHIPPED | OUTPATIENT
Start: 2025-08-21

## 2025-08-21 RX ORDER — HYDROCORTISONE 5 MG/1
10 TABLET ORAL 2 TIMES DAILY
Qty: 360 TABLET | Refills: 0 | Status: SHIPPED | OUTPATIENT
Start: 2025-08-21 | End: 2025-11-19

## (undated) DEVICE — VISIGI 3D®  CALIBRATION SYSTEM  SIZE 40FR STD W/ BULB: Brand: BOEHRINGER® VISIGI 3D™ SLEEVE GASTRECTOMY CALIBRATION SYSTEM, SIZE 40FR W/BULB

## (undated) DEVICE — SYR LL TP 10ML STRL

## (undated) DEVICE — TROC BLADLES ANCHORPORT/OPTI LP 8X120MM 1P/U

## (undated) DEVICE — KT CLN CLEANOR SCPE

## (undated) DEVICE — NDL HYPO PRECISIONGLIDE REG 22G 1 1/2

## (undated) DEVICE — SUT MNCRYL 4/0 PS2 27IN UD MCP426H

## (undated) DEVICE — MAT PREVALON MOBL TRANSFR AIR W/PAD REPROC 39X81IN

## (undated) DEVICE — THE CHANNEL CLEANING BRUSH IS A NYLON FLEXI BRUSH ATTACHED TO A FLEXIBLE PLASTIC SHEATH DESIGNED TO SAFELY REMOVE DEBRIS FROM FLEXIBLE ENDOSCOPES.

## (undated) DEVICE — FRCP BIOP ENDO CAPTURAPRO SPK SERR 2.8MM 230CM

## (undated) DEVICE — ARM DRAPE

## (undated) DEVICE — SYS CLOSE PORTII CARTR/THOMASN XL

## (undated) DEVICE — ENDOGATOR AUXILIARY WATER JET CONNECTOR: Brand: ENDOGATOR

## (undated) DEVICE — ADHS SKIN PREMIERPRO EXOFIN TOPICAL HI/VISC .5ML

## (undated) DEVICE — BAPTIST TURNOVER KIT: Brand: MEDLINE INDUSTRIES, INC.

## (undated) DEVICE — SUT VIC 3/0 SH 36IN VCP527H

## (undated) DEVICE — CONMED SCOPE SAVER BITE BLOCK, 20X27 MM: Brand: SCOPE SAVER

## (undated) DEVICE — BANDAGE,GAUZE,BULKEE II,4.5"X4.1YD,STRL: Brand: MEDLINE

## (undated) DEVICE — STAPLER 60: Brand: SUREFORM

## (undated) DEVICE — FRCP BX RADJAW4 NDL 2.8 240 STD OG

## (undated) DEVICE — ST TBG AIRSEAL FLTR TRI LUM

## (undated) DEVICE — ENDOPATH XCEL WITH OPTIVIEW TECHNOLOGY BLADELESS TROCARS WITH STABILITY SLEEVES: Brand: ENDOPATH XCEL OPTIVIEW

## (undated) DEVICE — BNDR ABD 4PANEL 12IN 74TO85IN

## (undated) DEVICE — SEAL

## (undated) DEVICE — CUFF,BP,DISP,1 TUBE,ADULT,HP: Brand: MEDLINE

## (undated) DEVICE — SENSR O2 OXIMAX FNGR A/ 18IN NONSTR

## (undated) DEVICE — VESSEL SEALER EXTEND: Brand: ENDOWRIST

## (undated) DEVICE — REDUCER: Brand: ENDOWRIST

## (undated) DEVICE — BLADELESS OBTURATOR: Brand: WECK VISTA

## (undated) DEVICE — CANNULA SEAL

## (undated) DEVICE — TISSUE RETRIEVAL SYSTEM: Brand: INZII RETRIEVAL SYSTEM

## (undated) DEVICE — GLV SURG SENSICARE W/ALOE PF LF 8 STRL

## (undated) DEVICE — Device: Brand: DEFENDO AIR/WATER/SUCTION AND BIOPSY VALVE

## (undated) DEVICE — SUT VIC 0 SUTUPAK TIES 18IN J906G

## (undated) DEVICE — DAVINCI: Brand: MEDLINE INDUSTRIES, INC.

## (undated) DEVICE — DRN PENRS RO SILCNE 1/4X12IN LF STRL